# Patient Record
Sex: FEMALE | Race: WHITE | NOT HISPANIC OR LATINO | Employment: FULL TIME | URBAN - METROPOLITAN AREA
[De-identification: names, ages, dates, MRNs, and addresses within clinical notes are randomized per-mention and may not be internally consistent; named-entity substitution may affect disease eponyms.]

---

## 2017-01-05 ENCOUNTER — CLINICAL SUPPORT (OUTPATIENT)
Dept: REHABILITATION | Facility: HOSPITAL | Age: 52
End: 2017-01-05
Attending: FAMILY MEDICINE
Payer: COMMERCIAL

## 2017-01-05 DIAGNOSIS — M62.89 PELVIC FLOOR DYSFUNCTION: Primary | ICD-10-CM

## 2017-01-05 PROCEDURE — 97110 THERAPEUTIC EXERCISES: CPT | Mod: PN

## 2017-01-05 PROCEDURE — 97161 PT EVAL LOW COMPLEX 20 MIN: CPT | Mod: PN

## 2017-01-05 NOTE — PROGRESS NOTES
Patient: Tara Arambula [facility] #:  [id number]    Date of treatment: 01/05/2017   Time in: 2:26  Time out: 3:05  # Visits: 1/1  Auth: 1  Referral expiration: 12/31/17  POC expiration: 3/30/17    Outpatient Physical Therapy   Initial Evaluation    Tara Arambula is a 51 y.o. female evaluated on 01/05/2017    Physician:  Leona Broussard NP   Diagnosis: Incontinence, urinary urgency    Treatment ordered: PT    Medical History:   Past Medical History   Diagnosis Date    Urinary tract infection     Uterine fibroid         Surgical History:   Past Surgical History   Procedure Laterality Date    Tubal ligation  December 1999     Postpartum    Rhinoplasty tip  1980    Umbilical cyst  2004    Pelvic laparoscopy       uterine septum repair    Hysterectomy          Medications:   Current Outpatient Prescriptions   Medication Sig    amitriptyline (ELAVIL) 10 MG tablet Take 1 tablet (10 mg total) by mouth nightly as needed for Insomnia.    DOC-Q-LACE 100 mg capsule TAKE 1 CAPSULE BY MOUTH TWICE DAILY    estradiol (ESTRACE) 0.01 % (0.1 mg/gram) vaginal cream Place 1 g vaginally twice a week.    fish oil-omega-3 fatty acids 300-1,000 mg capsule Take 2 g by mouth once daily.    hydrOXYzine HCl (ATARAX) 25 MG tablet Take 1 tablet (25 mg total) by mouth once daily.    methen-m.blue-s.phos-phsal-hyo (URIBEL) 118-10-40.8-36 mg Cap Take 1 capsule by mouth 4 (four) times daily as needed.    methen-m.blue-s.phos-phsal-hyo (URIBEL) 118-10-40.8-36 mg Cap Take 1 capsule by mouth every 6 (six) hours as needed.    mirabegron (MYRBETRIQ) 50 mg Tb24 Take 1 tablet (50 mg total) by mouth once daily.    multivitamin (THERAGRAN) per tablet Take 2 tablets by mouth once daily.    polyethylene glycol (COLYTE) 240-22.72-6.72 -5.84 gram SolR      No current facility-administered medications for this visit.        Allergies: Review of patient's allergies indicates: none reported  No Known Allergies   Precautions: universal    OB/GYN History: childbirth vaginal delivery, episiotomy, prolapse, vaginal dryness, painful vaginal penetration and pelvic pain; 3 pregnancies, highest birth weight 8 lbs 4 ounces  Bladder/Bowel History: denies    Recent Surgeries per pt's chart:  1) otal laparoscopic hysterectomy  2) Bilateral laparoscopic salpingectomy  3) Bilateral laparoscopic uterosacral suspension  4) Cystourethroscopy  5) Posterior repair with perineorrhaphy  6) Intraoperative radiology films for incorrect count: positive for needle in pelvis, removed laparoscopically, repeat film negative and count correct       Barriers to Learning: none  Educational/Spiritual/Cultural needs: none  Environmental Barriers: none noted  Abuse/Neglect: no signs  Nutritional Status: well developed well nourished  Fall Risk: none    Subjective     What is your primary concern? And when did this first begin?  Pt reports she has been diagnosed with IC and also has some vaginal pain. She had a hysterectomy 2 years ago in which they removed her uterus but not her ovaries. She also had urinary issues prior to that and had her bladder tacked up as well as surgery for her bowels. She states that her IC and vaginal pain has gotten worse since her surgeries. Tara reports that she has totally changed her diet. She has not had any flare ups the past month but did just start to have a flare up yesterday. She states that she thinks working out and intercourse are the main things that trigger her flare ups. She went to an exercise class on Monday and was ok afterwards but wondering if this flare up is associated to that. She reports that she sits down a lot at work and has to switch chairs around a lot (just recently got a new chair), also issues with gardening (being hunched over maybe compressing that area). Pt reports abdominal cramping and that her pain feels like it comes from the urethra. Her abdominal cramping does not always coincide with urgency.    Pt states  that everything kind of started to really flare up about 4 months after her surgery which is also when she started going back to the gym so it has been about the past two years.    Pain: Patient reports 9/10 at worst with 0 being the lowest and 10 being the highest (terrible stinging feeling lower in the vagina). Having a feeling of urgency, not bad pain    Pain with vaginal/pelvic exam? Yes, typically pain after intercourse as opposed to during; she recently reports that she had throbbing pain in the middle of the night in her vagina, usually it is a feeling of pressure in the vagina with urgency; not so much pain with a speculum exam  Sexually active? Yes, decreased frequency    Previous treatment included: no PT    Frequency of Urination: Daytime: Every 3 hours or so if not flared up, more often if flared, maybe every hour (and worse with sit>stand)        Nighttime: once, could be every two hours if flaring    Urine Stream: strong, not as strong if flaring, tries to relax if flared up at the end and a bit more will trickle out    Bladder Leakage: No    Do you have difficulty initiating your urine stream? No  Do you feel like you are emptying completely? Yes, not as much if flared    Frequency of Bowel Movements: 1-2 times a day    Do you have difficulty initiating a bowel movement? Yes  Never had an issue with constipation before her surgery, now pt states she doesn't really feel urgency to use the bathroom for a BM so has trained herself to go anyway  Colon Leakage: No  Stool consistency? Soft and formed since using citrocelle (before was pellets)    Types/amount of Fluid Intake: 40-60 oz water, herbal tea on occasion  Diet: IC diet, no saucy things or spicy food, no tomatoes, not a lot of salad; high fiber cereal in the morning, chicken and broccoli for lunch, eats well  Current Exercise: really enjoys exercise, has recently returned to the gym, is concerned going to the gym is associated with flare  ups    Occupation: Pt works as an  and job related duties include lots of sitting.   Living situation? Lives with  and has 3 kids, two in collge, one in high school     Patient's Goals: To get better, to figure out what is causing all of this    Objective     Deferred d/t time constraint, will perform assessment at pt's next visit.    TREATMENT    Education: PT spent 15 minutes on pt education including: Instructed on general anatomy/physiology of urinary/bowel system and PF function in depth using pelvic model; discussed plan of care with patient; instructed in purpose of physical therapy and the  benefits/risks of treatment; instructed in risks of refusing treatment. Patient agreed to treatment plan. Tara Arambula demonstrated and verbalized understanding of all instruction.    Assessment     Mrs. Arambula is a 51 y.o. female referred to outpatient physical therapy with a medical diagnosis of urinary urgency, constipation, and PF muscle spasm.  Pt reports that her IC and vaginal pain have worsened since having surgery in December 2014 (hysterectomy and posterior repair with perineorrhaphy) but that she has been doing well for the past month. Unable to perform examination today d/t time constraints and need to spend today's time on history/interview and pt education regarding the role of PF PT. Will perform physical exam at pt's next visit and adjust goals/POC accordingly. Pt will benefit from physcial therapy services in order to maximize pain free functional independence. The following goals were discussed with the patient and patient is in agreement with them as to be addressed in the treatment plan.     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 Pt will verbalize improved awareness of PFM activity as palpated by PT in order to  improve activity involvement with HEP.  2. Pt will tolerate HEP to improve impairments and independence with ADL's.    Long Term Goals: 3 months (3/30/17)  1. Pt will be able to tolerate sitting for 30-60 minutes without exacerbation of symptoms in order to improve functional independence and work tolerance.  2. Pt will be able to perform exercise routine developed with PT in order to be able to exercise without exacerbation of symptoms to enhance QOL/for stress relief.  3. Pt will be independent with HEP and self management.        Plan     Pt will be treated by physical therapy 1-2 times a week for 3 months for Pt Education, HEP, therapeutic exercises, neuromuscular re-education, therapeutic activity, gait training, manual therapy, and modalities (including SEMG) PRN to achieve established goals.

## 2017-01-09 NOTE — PLAN OF CARE
Patient: Tara Arambula [facility] [address] #:  [id number]    Date of treatment: 01/05/2017   Time in: 2:26  Time out: 3:05  # Visits: 1/1  Auth: 1  Referral expiration: 12/31/17  POC expiration: 3/30/17    Outpatient Physical Therapy   Initial Evaluation    Tara Arambula is a 51 y.o. female evaluated on 01/05/2017    Physician:  Leona Broussard NP   Diagnosis: Incontinence, urinary urgency    Treatment ordered: PT    Medical History:   Past Medical History   Diagnosis Date    Urinary tract infection     Uterine fibroid         Surgical History:   Past Surgical History   Procedure Laterality Date    Tubal ligation  December 1999     Postpartum    Rhinoplasty tip  1980    Umbilical cyst  2004    Pelvic laparoscopy       uterine septum repair    Hysterectomy          Medications:   Current Outpatient Prescriptions   Medication Sig    amitriptyline (ELAVIL) 10 MG tablet Take 1 tablet (10 mg total) by mouth nightly as needed for Insomnia.    DOC-Q-LACE 100 mg capsule TAKE 1 CAPSULE BY MOUTH TWICE DAILY    estradiol (ESTRACE) 0.01 % (0.1 mg/gram) vaginal cream Place 1 g vaginally twice a week.    fish oil-omega-3 fatty acids 300-1,000 mg capsule Take 2 g by mouth once daily.    hydrOXYzine HCl (ATARAX) 25 MG tablet Take 1 tablet (25 mg total) by mouth once daily.    methen-m.blue-s.phos-phsal-hyo (URIBEL) 118-10-40.8-36 mg Cap Take 1 capsule by mouth 4 (four) times daily as needed.    methen-m.blue-s.phos-phsal-hyo (URIBEL) 118-10-40.8-36 mg Cap Take 1 capsule by mouth every 6 (six) hours as needed.    mirabegron (MYRBETRIQ) 50 mg Tb24 Take 1 tablet (50 mg total) by mouth once daily.    multivitamin (THERAGRAN) per tablet Take 2 tablets by mouth once daily.    polyethylene glycol (COLYTE) 240-22.72-6.72 -5.84 gram SolR      No current facility-administered medications for this visit.        Allergies: Review of patient's allergies indicates: none reported  No Known Allergies   Precautions: universal    OB/GYN History: childbirth vaginal delivery, episiotomy, prolapse, vaginal dryness, painful vaginal penetration and pelvic pain; 3 pregnancies, highest birth weight 8 lbs 4 ounces  Bladder/Bowel History: denies    Recent Surgeries per pt's chart:  1) otal laparoscopic hysterectomy  2) Bilateral laparoscopic salpingectomy  3) Bilateral laparoscopic uterosacral suspension  4) Cystourethroscopy  5) Posterior repair with perineorrhaphy  6) Intraoperative radiology films for incorrect count: positive for needle in pelvis, removed laparoscopically, repeat film negative and count correct       Barriers to Learning: none  Educational/Spiritual/Cultural needs: none  Environmental Barriers: none noted  Abuse/Neglect: no signs  Nutritional Status: well developed well nourished  Fall Risk: none    Subjective     What is your primary concern? And when did this first begin?  Pt reports she has been diagnosed with IC and also has some vaginal pain. She had a hysterectomy 2 years ago in which they removed her uterus but not her ovaries. She also had urinary issues prior to that and had her bladder tacked up as well as surgery for her bowels. She states that her IC and vaginal pain has gotten worse since her surgeries. Tara reports that she has totally changed her diet. She has not had any flare ups the past month but did just start to have a flare up yesterday. She states that she thinks working out and intercourse are the main things that trigger her flare ups. She went to an exercise class on Monday and was ok afterwards but wondering if this flare up is associated to that. She reports that she sits down a lot at work and has to switch chairs around a lot (just recently got a new chair), also issues with gardening (being hunched over maybe compressing that area). Pt reports abdominal cramping and that her pain feels like it comes from the urethra. Her abdominal cramping does not always coincide with urgency.    Pt states  that everything kind of started to really flare up about 4 months after her surgery which is also when she started going back to the gym so it has been about the past two years.    Pain: Patient reports 9/10 at worst with 0 being the lowest and 10 being the highest (terrible stinging feeling lower in the vagina). Having a feeling of urgency, not bad pain    Pain with vaginal/pelvic exam? Yes, typically pain after intercourse as opposed to during; she recently reports that she had throbbing pain in the middle of the night in her vagina, usually it is a feeling of pressure in the vagina with urgency; not so much pain with a speculum exam  Sexually active? Yes, decreased frequency    Previous treatment included: no PT    Frequency of Urination: Daytime: Every 3 hours or so if not flared up, more often if flared, maybe every hour (and worse with sit>stand)        Nighttime: once, could be every two hours if flaring    Urine Stream: strong, not as strong if flaring, tries to relax if flared up at the end and a bit more will trickle out    Bladder Leakage: No    Do you have difficulty initiating your urine stream? No  Do you feel like you are emptying completely? Yes, not as much if flared    Frequency of Bowel Movements: 1-2 times a day    Do you have difficulty initiating a bowel movement? Yes  Never had an issue with constipation before her surgery, now pt states she doesn't really feel urgency to use the bathroom for a BM so has trained herself to go anyway  Colon Leakage: No  Stool consistency? Soft and formed since using citrocelle (before was pellets)    Types/amount of Fluid Intake: 40-60 oz water, herbal tea on occasion  Diet: IC diet, no saucy things or spicy food, no tomatoes, not a lot of salad; high fiber cereal in the morning, chicken and broccoli for lunch, eats well  Current Exercise: really enjoys exercise, has recently returned to the gym, is concerned going to the gym is associated with flare  ups    Occupation: Pt works as an  and job related duties include lots of sitting.   Living situation? Lives with  and has 3 kids, two in collge, one in high school     Patient's Goals: To get better, to figure out what is causing all of this    Objective     Deferred d/t time constraint, will perform assessment at pt's next visit.    TREATMENT    Education: PT spent 15 minutes on pt education including: Instructed on general anatomy/physiology of urinary/bowel system and PF function in depth using pelvic model; discussed plan of care with patient; instructed in purpose of physical therapy and the  benefits/risks of treatment; instructed in risks of refusing treatment. Patient agreed to treatment plan. Tara Arambula demonstrated and verbalized understanding of all instruction.    Assessment     Mrs. Arambula is a 51 y.o. female referred to outpatient physical therapy with a medical diagnosis of urinary urgency, constipation, and PF muscle spasm.  Pt reports that her IC and vaginal pain have worsened since having surgery in December 2014 (hysterectomy and posterior repair with perineorrhaphy) but that she has been doing well for the past month. Unable to perform examination today d/t time constraints and need to spend today's time on history/interview and pt education regarding the role of PF PT. Will perform physical exam at pt's next visit and adjust goals/POC accordingly. Pt will benefit from physcial therapy services in order to maximize pain free functional independence. The following goals were discussed with the patient and patient is in agreement with them as to be addressed in the treatment plan.     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 Pt will verbalize improved awareness of PFM activity as palpated by PT in order to  improve activity involvement with HEP.  2. Pt will tolerate HEP to improve impairments and independence with ADL's.    Long Term Goals: 3 months (3/30/17)  1. Pt will be able to tolerate sitting for 30-60 minutes without exacerbation of symptoms in order to improve functional independence and work tolerance.  2. Pt will be able to perform exercise routine developed with PT in order to be able to exercise without exacerbation of symptoms to enhance QOL/for stress relief.  3. Pt will be independent with HEP and self management.        Plan     Pt will be treated by physical therapy 1-2 times a week for 3 months for Pt Education, HEP, therapeutic exercises, neuromuscular re-education, therapeutic activity, gait training, manual therapy, and modalities (including SEMG) PRN to achieve established goals.

## 2017-01-17 ENCOUNTER — CLINICAL SUPPORT (OUTPATIENT)
Dept: REHABILITATION | Facility: HOSPITAL | Age: 52
End: 2017-01-17
Attending: FAMILY MEDICINE
Payer: COMMERCIAL

## 2017-01-17 DIAGNOSIS — M62.89 PELVIC FLOOR DYSFUNCTION: Primary | ICD-10-CM

## 2017-01-17 PROCEDURE — 97112 NEUROMUSCULAR REEDUCATION: CPT | Mod: PN

## 2017-01-17 NOTE — PROGRESS NOTES
Patient: Tara Arambula [facility] #: [id number]   Diagnosis: No diagnosis found.   Date of start of care: 1/5/17  Date of treatment: 01/17/2017   Time in: 8:05  Time out: 9:02  Total treatment time: 57 minutes  # Visits: 2/20  Auth expiration: 12/31/17  POC expiration: 3/30/17  Outpatient Physical Therapy   Daily Note    Subjective     Pt states she has had a bit of flaring since her last visit, not cramping, just urgency.    Pain: Current: 0/10    Objective     ORTHO SCREEN  Posture: decreased lumbar lordosis     Pelvic Alignment: no deviations noted in supine    ABDOMINALS  Scarring: 3 incision sites from laproscopic hysterectomy all with good healing and good mobility, 1 vertical incision from navel down ~ 2-3 inches, good mobility, minimally adhesed   Diastasis: none   Abdominal strength: Rectus: good     Transverse: strong contraction, good isolation from RA    Tenderness noted with palpation to bilateral iliacus, L > R       VAGINAL PELVIC FLOOR EXAM  EXTERNAL ASSESSMENT  Skin Condition: WNL  Scarring: episiotomy scar noted  Sensation: WNL  Pain: none  Introitus: gaping   Voluntary Contraction: visible lift  Voluntary Relaxation: minimal drop  Involuntary Contraction: bulge  Bearing down: anterior vaginal wall weakness noted; unable to bear down, initial contraction noted followed by use of abdominals and straining      INTERNAL ASSESSMENT  Pain: tender areas noted as follows: bilateral obturators  Sensation: able to localize pressure appropriately, pressure from side to side and ability to feel the difference between lift and drop    Vaginal Vault: roomy  Muscle Bulk: atrophy  Muscle Power: 2-3/5 L side, 2 at best on the R  Muscle Endurance: 2-3 sec; breath holding and tenses abdominals  # Reps To Fatigue: NT d/t weakness    Fast Contractions: pt performed 5 quick flicks - she demonstrated incomplete drop     Quality of Contraction: decreased hold; pt demonstrates good sphincter control and strength but  decreased PFM strength/control  Specificity: contracts abdominals   Coordination: tends to hold breath during PFM contraction  Bearing down: pt demonstrates PFM excursion but strains and uses abdominal     SEMG EVALUATION: Deferred due to time constraints      TREATMENT    Pt was instructed in neuromuscular reeducation for 45 minutes including:  - Kegals with manual feedback x 10  - 5 quick flicks; incomplete drop  - Diaphragmatic breathing with tactile feedback and verbal instruction throughout    Education: Discussed functional status, progress and POC with patient. Pt was instructed in HEP including diaphragmatic breathing and was provided with a handout. Tara demonstrated and verbalized understanding of all instruction given.    Assessment     Pt tolerated session well without visible adverse effects. Performed internal exam today with findings noted above. Tara presents with poor coordination and decreased strength/atrophy, endurance, and control of her PFM. She will benefit from skilled PT intervention to address these deficits in order to maximize pain free functional independence. Updated goals as noted below based on exam findings. Will instruct patient in PFM exercises/therapeutic exercise at next visit and provide manual therapy as appropriate.    Pt is making good progress towards established goals. No spiritual, cultural, or educational needs identified.    Updated GOALS  Short Term Goals: 1 month (2/2/17)  1. Pt will verbalize improved awareness of PFM activity as palpated by PT in order to improve activity involvement with HEP.  2. Pt will tolerate HEP to improve impairments and independence with ADL's.  3. Pt will demonstrate ability to perform kegal on demand with decreased use of overflow mm and without breath holding. Goal added 1/17/17     Long Term Goals: 3 months (3/30/17)  1. Pt will be able to tolerate sitting for 30-60 minutes without exacerbation of symptoms in order to improve  functional independence and work tolerance.  2. Pt will be able to perform exercise routine developed with PT in order to be able to exercise without exacerbation of symptoms to enhance QOL/for stress relief.  3. Pt will be independent with HEP and self management.  4. Pt will increase PFM strength to 3/5 with ability to hold for 5-10 seconds without exacerbation of symptoms in order to improve functional strength and tolerance to exercise. Goal added 1/17/17    FOTO next visit    Plan     Continue with established POC, working toward PT goals.

## 2017-01-17 NOTE — PATIENT INSTRUCTIONS
Home Exercise Program: 01/17/2017      DIAPHRAGMATIC BREATHING     The diaphragm is a dome shaped muscle that forms the floor of the rib cage. It is the most efficient muscle for breathing and relaxation, although most people are not used to using the diaphragm. Diaphragmatic or belly breathing is an important technique to learn because it helps settle down or relax the autonomic nervous system. The correct use of diaphragmatic breathing can help to quiet brain activity resulting in the relaxation of all the muscles and organs of the body. This is accomplished by slow rhythmic breathing concentrated in the diaphragm muscle rather than the chest.    How to do proper relaxation breathing:     Start by lying on your back or reclining in a chair in a relaxed position. Place one hand on your chest and the other on your abdomen.   Relax your jaw by placing your tongue on the roof of your mouth and keeping your teeth slightly apart.    Take a deep breath in through your nose, letting the abdomen expand and rise while you keep your upper chest, neck and shoulders relaxed.    As you breathe out through your mouth, allow your abdomen and chest to fall. Exhale completely.   Remember to breathe slowly.  Do not force your breathing. Do not hold your breath.   Repeat for _______ minutes.

## 2017-01-24 ENCOUNTER — CLINICAL SUPPORT (OUTPATIENT)
Dept: REHABILITATION | Facility: HOSPITAL | Age: 52
End: 2017-01-24
Attending: FAMILY MEDICINE
Payer: COMMERCIAL

## 2017-01-24 DIAGNOSIS — M62.89 PELVIC FLOOR DYSFUNCTION: Primary | ICD-10-CM

## 2017-01-24 PROCEDURE — 97140 MANUAL THERAPY 1/> REGIONS: CPT | Mod: PN

## 2017-01-24 PROCEDURE — 97112 NEUROMUSCULAR REEDUCATION: CPT | Mod: PN

## 2017-01-24 NOTE — PROGRESS NOTES
Patient: Tara [facility] #: [id number]   Diagnosis:   Encounter Diagnosis   Name Primary?    Pelvic floor dysfunction Yes      Date of start of care: 1/5/17  Date of treatment: 01/24/2017   Time in: 8:05  Time out: 9:12  Total treatment time: 67 minutes  # Visits: 3/20  Auth expiration: 12/31/17  POC expiration: 3/30/17  Outpatient Physical Therapy   Daily Note    Subjective     Pt states she flared up a few days last week and then it just went away on Friday. She does state that she went to the gym on Thursday so maybe that provided some stress relief that helped alleviate the flare up ( she has had a lot going on and reports being a bit anxious about it all). Pt also states that her mother has dementia and she is the only one in town to manage.    Pain: Current: 0/10    Objective     TREATMENT    Pt was instructed in neuromuscular reeducation for 35 minutes including:  - 8 x 5'' kegals with 10'' rest (pt was instructed to take 3 diaphragmatic breaths between contractions) manual feedback; Pt demonstrates normal resting baseline but incomplete drop during kegals today requiring verbal cueing after each contraction; Tara was able to derecruit slowly but fully with verbal cueing and instruction to take deep breaths between contractions    Pt received the following manual therapy techniques for 25 min: strain counterstrain applied to R iliacus; soft tissue mobilization applied to R levators; soft tissue and myofascial mobilization applied to R obturator internus with sustained stretch; Renetta was noted to have increased tightness on the right today, including R obturator and right abdominal region; R levators noted to have atrophy compared to L    Education: Discussed functional status, progress and POC with patient. Pt was instructed to continue with current HEP consisting of diaphragmatic breathing and was instructed in additional HEP of kegals (see pt instructions) and piriformis stretch; she  verbalized and demonstrated understanding and was provided with a handout. Tara demonstrated and verbalized understanding of all instruction given.    Assessment     Pt tolerated session well without visible adverse effects. She presented today with increased tension throughout the right side of her pelvic including her lower abdomen, iliacus, and obturator internus. Applied iliacus release today, will perform soft tissue and myofascial mobilization of her abdominal region as appropriate moving forward. Tara demonstrated improved ability to perform a PFM contraction today with minimal if any use of overflow mm and ability to maintain breathing pattern; she required verbal cueing to drop fully and demonstrated slow derecruitment. Will continue to progress. Tara will continue to benefit from skilled PT intervention to maximize pain free functional independence.  Will provide manual therapy to abdomen as needed at next session and evaluate PFM via SEMG.  Pt is making good progress towards established goals. No spiritual, cultural, or educational needs identified.    Updated GOALS  Short Term Goals: 1 month (2/2/17)  1. Pt will verbalize improved awareness of PFM activity as palpated by PT in order to improve activity involvement with HEP.  2. Pt will tolerate HEP to improve impairments and independence with ADL's.  3. Pt will demonstrate ability to perform kegal on demand with decreased use of overflow mm and without breath holding. Goal added 1/17/17     Long Term Goals: 3 months (3/30/17)  1. Pt will be able to tolerate sitting for 30-60 minutes without exacerbation of symptoms in order to improve functional independence and work tolerance.  2. Pt will be able to perform exercise routine developed with PT in order to be able to exercise without exacerbation of symptoms to enhance QOL/for stress relief.  3. Pt will be independent with HEP and self management.  4. Pt will increase PFM strength to 3/5 with  ability to hold for 5-10 seconds without exacerbation of symptoms in order to improve functional strength and tolerance to exercise. Goal added 1/17/17    Plan     Continue with established POC, working toward PT goals.

## 2017-01-24 NOTE — PATIENT INSTRUCTIONS
Home Exercises: 1/24/17    Kegals in supine    1. Lie comfortably with some support under your knees. Take a few deep breaths to relax and to feel your belly soften.    2. Squeeze and lift your pelvic floor muscles as if trying not to pee (i.e. Kegal) and hold for 5 seconds without holding your breath. Remember to keep your buttocks, inner thighs, and belly relaxed.    3. Relax and drop your pelvic floor muscles. Once you feel you have released the contraction, take 3 diaphragmatic breaths to feel your pelvic floor relaxing even more.    4. Repeat 10 times, twice per day.    5. Follow up with a few minutes of deep breathing and stretching.

## 2017-01-31 ENCOUNTER — CLINICAL SUPPORT (OUTPATIENT)
Dept: REHABILITATION | Facility: HOSPITAL | Age: 52
End: 2017-01-31
Attending: FAMILY MEDICINE
Payer: COMMERCIAL

## 2017-01-31 DIAGNOSIS — M62.89 PELVIC FLOOR DYSFUNCTION: Primary | ICD-10-CM

## 2017-01-31 PROCEDURE — 97112 NEUROMUSCULAR REEDUCATION: CPT | Mod: PN

## 2017-01-31 NOTE — PROGRESS NOTES
Patient: Tara [facility] #: [id number]   Diagnosis:   Encounter Diagnosis   Name Primary?    Pelvic floor dysfunction Yes      Date of start of care: 1/5/17  Date of treatment: 01/31/2017   Time in: 8:05  Time out: 9:05  Total treatment time: 60 minutes  # Visits: 4/20  Auth expiration: 12/31/17  POC expiration: 3/30/17  Outpatient Physical Therapy   Daily Note    Subjective     Pt states she had a pretty significant flare up since our last visit that has since resolved. She was a bit sore after our last session. She reports that she did do the kegals a few times but did not perform them when she was really flared.     Pain: Current: pt reports discomfort and mild pain in her lateral lower abdomen bilaterally    Objective     TREATMENT    Pt was instructed in neuromuscular reeducation for 55 minutes including:    - PT guided relaxation training with SEMG assist; pt demonstrated initial elevated resting baseline but able to achieve normal resting baseline post relaxation training  -  10 x 5''kegals with 10'' rest in supine with SEMG assist; pt demonstrates normal resting baseline, good WR rise, fair holding ability, slow derecruitment  - 5 x 5'' TA activation with SEMG; pt demonstrates moderate carry over to her PFM with incomplete derecruitment of PFM between contractions  - Progressive muscle relaxation (body scan)    Pt received the following manual therapy techniques for 0 min: strain counterstrain applied to R iliacus; soft tissue mobilization applied to R levators; soft tissue and myofascial mobilization applied to R obturator internus with sustained stretch; Renetta was noted to have increased tightness on the right today, including R obturator and right abdominal region; R levators noted to have atrophy compared to L    Education: Discussed functional status, progress and POC with patient. Pt was instructed to continue with current HEP consisting of diaphragmatic breathing and kegals (see pt  instructions) and piriformis stretch; she was instructed to add in calm seema body scan to her home routine. she verbalized and demonstrated understanding. Traa demonstrated and verbalized understanding of all instruction given.    Assessment     Pt tolerated session well without visible adverse effects. She demonstrates good awareness of her PFM; she continues to present with slow and sometimes incomplete derecruitment but is able to drop fully with cueing and when instructed to take time in between contractions to focus on her breath. Will reassess abdominal region at Tara's next visit and treat accordingly. Will begin therapeutic exercises/stretching program at next visit. Tara will continue to benefit from skilled PT intervention to maximize pain free functional independence.  Pt is making good progress towards established goals. No spiritual, cultural, or educational needs identified.    Updated GOALS  Short Term Goals: 1 month (2/2/17)  1. Pt will verbalize improved awareness of PFM activity as palpated by PT in order to improve activity involvement with HEP.  2. Pt will tolerate HEP to improve impairments and independence with ADL's.  3. Pt will demonstrate ability to perform kegal on demand with decreased use of overflow mm and without breath holding. Goal added 1/17/17     Long Term Goals: 3 months (3/30/17)  1. Pt will be able to tolerate sitting for 30-60 minutes without exacerbation of symptoms in order to improve functional independence and work tolerance.  2. Pt will be able to perform exercise routine developed with PT in order to be able to exercise without exacerbation of symptoms to enhance QOL/for stress relief.  3. Pt will be independent with HEP and self management.  4. Pt will increase PFM strength to 3/5 with ability to hold for 5-10 seconds without exacerbation of symptoms in order to improve functional strength and tolerance to exercise. Goal added 1/17/17    Plan     Continue with  established POC, working toward PT goals.

## 2017-02-02 ENCOUNTER — OFFICE VISIT (OUTPATIENT)
Dept: UROGYNECOLOGY | Facility: CLINIC | Age: 52
End: 2017-02-02
Payer: COMMERCIAL

## 2017-02-02 VITALS
WEIGHT: 109.38 LBS | HEIGHT: 65 IN | DIASTOLIC BLOOD PRESSURE: 60 MMHG | BODY MASS INDEX: 18.22 KG/M2 | SYSTOLIC BLOOD PRESSURE: 90 MMHG

## 2017-02-02 DIAGNOSIS — Z87.19 HX OF CONSTIPATION: ICD-10-CM

## 2017-02-02 DIAGNOSIS — R39.15 URINARY URGENCY: ICD-10-CM

## 2017-02-02 DIAGNOSIS — N30.10 INTERSTITIAL CYSTITIS: Primary | ICD-10-CM

## 2017-02-02 DIAGNOSIS — N95.2 VAGINAL ATROPHY: ICD-10-CM

## 2017-02-02 LAB
BILIRUB SERPL-MCNC: NORMAL MG/DL
BLOOD URINE, POC: NORMAL
COLOR, POC UA: NORMAL
GLUCOSE UR QL STRIP: NORMAL
KETONES UR QL STRIP: NORMAL
LEUKOCYTE ESTERASE URINE, POC: NORMAL
NITRITE, POC UA: NORMAL
PH, POC UA: 1
PROTEIN, POC: NORMAL
SPECIFIC GRAVITY, POC UA: 6
UROBILINOGEN, POC UA: NORMAL

## 2017-02-02 PROCEDURE — 99999 PR PBB SHADOW E&M-EST. PATIENT-LVL III: CPT | Mod: PBBFAC,,, | Performed by: NURSE PRACTITIONER

## 2017-02-02 PROCEDURE — 99213 OFFICE O/P EST LOW 20 MIN: CPT | Mod: 25,S$GLB,, | Performed by: NURSE PRACTITIONER

## 2017-02-02 PROCEDURE — 81002 URINALYSIS NONAUTO W/O SCOPE: CPT | Mod: S$GLB,,, | Performed by: NURSE PRACTITIONER

## 2017-02-02 NOTE — PROGRESS NOTES
Urogyn follow up  2017    [facility]  [address] [address]    Tara Unger  [id number]  1965      Tara Unger is a 51 y.o.  here for a urogyn follow up. Long discussion of her symptoms     14  Title of Operation:  1) Total laparoscopic hysterectomy  2) Bilateral laparoscopic salpingectomy  3) Bilateral laparoscopic uterosacral suspension  4) Cystourethroscopy  5) Posterior repair with perineorrhaphy  6) Intraoperative radiology films for incorrect count: positive for needle in pelvis, removed laparoscopically, repeat film negative and count correct.     Indications for Surgery:  1) UI: (+) UVALDO only if bladder really full and sneezes, not with exercise. UVALDO not daily. (--) pads, no underwear changes. Daytime frequency: Q 2 hours. Nocturia: Yes: 1/night, around 4 AM. (--) dysuria, (--) hematuria, (--) frequent UTIs. Has had 1-2 mild UTIs. (+) complete bladder emptying.  --UF prolonged but normal otherwise. PF incomplete void until catheters removed. Compliance normal. Max capacity >400 mL. DO (--). UVALDO (--).   2) POP: Absent. (--) vaginal bleeding. +nl menses on OCP. (--) vaginal discharge. (+) sexually active. (--) dyspareunia. (+) Vaginal dryness. (--) vaginal estrogen use.   --POP-Q: Aa -1; Ba -1; C -6; Ap 0; Bp 0 (very distal rectocele); D -9. Genital hiatus 3, perineal body 3, total vaginal length 10.  3) BM: (+) constipation/straining. (--) chronic diarrhea. (--) hematochezia. (--) fecal incontinence. (--) fecal smearing/urgency. (+) incomplete evacuation. No splinting.   4) ? IC: Dx last year per Dr. Labadie at [facility]. No cysto. Was counseled to eliminate exacerbating foods--did seem to help. Seems worse after on feet all day or with stress. Sx: frequency, urgency, feels some burning at the end of void, difficult evacuation. +relief with emptying. Still + menses--on OCP due to frequent menses. No correlation with menses. May have some  flare with intercourse. Pain with flare: 6/10 only with urination, usually lasts x several weeks but gradually improves. Flares occur Q 2-3 months. Does have some anxiety. No previous treatment.   5) Patient desires ovarian preservation.     Preoperative Diagnosis:  1) Cystocele  2) Rectocele  3) Dysfunctional uterine bleeding  4) Urinary urgency/frequency  5) Concern for interstitial cystitis  6) Chronic constipation  7) Defecatory dysfunction  8) Mixed urinary incontinence    Postoperative Diagnosis:  1) Cystocele  2) Rectocele  3) Dysfunctional uterine bleeding  4) Urinary urgency/frequency  5) Concern for interstitial cystitis  6) Chronic constipation  7) Defecatory dysfunction  8) Mixed urinary incontinence    Past Medical History   Diagnosis Date    Urinary tract infection     Uterine fibroid        Past Surgical History   Procedure Laterality Date    Tubal ligation  December 1999     Postpartum    Rhinoplasty tip  1980    Umbilical cyst  2004    Pelvic laparoscopy       uterine septum repair    Hysterectomy         Current Outpatient Prescriptions   Medication Sig    DOC-Q-LACE 100 mg capsule TAKE 1 CAPSULE BY MOUTH TWICE DAILY    methen-m.blue-s.phos-phsal-hyo (URIBEL) 118-10-40.8-36 mg Cap Take 1 capsule by mouth 4 (four) times daily as needed.    fish oil-omega-3 fatty acids 300-1,000 mg capsule Take 2 g by mouth once daily.    hydrOXYzine HCl (ATARAX) 25 MG tablet Take 1 tablet (25 mg total) by mouth once daily.    multivitamin (THERAGRAN) per tablet Take 2 tablets by mouth once daily.    ospemifene 60 mg Tab Take 60 mg by mouth once daily.     No current facility-administered medications for this visit.        Last HPI dated 11/03/2016  1)  UI:  (--) UVALDO   (--) UUI   (--) pads:  Daytime frequency: Q 3  hours.  Nocturia: NO:   (--) dysuria,  (--) hematuria,  (--) frequent UTIs.  (--) complete bladder emptying.   Failed myrbetriq, trospium, and oxybutynin.  Using uribel intermittently.   She  had stopped myrbetriq 2 weeks ago x 4-5 days.  Started flaring.  She restarted and felt better after 1 week.  Has had increased urgency after intercourse 4 days ago.     2)  POP:  Absent.  introitus.  Symptoms:(--)    (--) vaginal bleeding. (--) vaginal discharge. (+) sexually active.  (--) dyspareunia (+)  Vaginal dryness.  (+) vaginal estrogen use- using estrace.    3)  BM:  (--) constipation/straining.  (--) chronic diarrhea. (--) hematochezia.  (--) fecal incontinence.  (--) fecal smearing/urgency.  (--) incomplete evacuation.  Eats high fiber diet.  Takes stool softener and fiber gummy.     4)?IC-  Had relief with last instillation this week.  Only had urgency after procedure and on Sunday.  Started using prelief-- not using with each meal.  No real flare this week. Hydroxyzine--1/2 tablet working wel    Changes since last visit  1)  UI:  (--) UVALDO   (--) UUI   (--) pads:  Daytime frequency: Q 3  hours.  Nocturia: NO:   (--) dysuria,  (--) hematuria,  (--) frequent UTIs.  (--) complete bladder emptying.   Failed myrbetriq, trospium, and oxybutynin.  Using uribel intermittently.    Has occasional urgency/burning.  Most of the irritation is around the urethra.    2)  POP:  Absent.  introitus.  Symptoms:(--)    (--) vaginal bleeding. (--) vaginal discharge. (+) sexually active.  (--) dyspareunia (+)  Vaginal dryness.  (+) vaginal estrogen use- using estrace.    3)  BM:  (--) constipation/straining.  (--) chronic diarrhea. (--) hematochezia.  (--) fecal incontinence.  (--) fecal smearing/urgency.  (--) incomplete evacuation.  Eats high fiber diet.  Was very constipated on myrbetriq and amitrypyline.      4)?IC-  Did not tolerate amitriptyline ? constipation  --burning is near urethra  --uses uribel prn  --seeing Lauren Shepley for pelvic floor PT  --stopped hydoxizine, myrbetrix, and elavil    ROS:  As per HPI.      Well Woman:  Pap:02/2013 normal- patient is post hyst- no further screening needed  Mammo:08/03/2016-  "normal  Colonoscopy:07/29/2016 normal- repeat in 10 years  Dexa:2014 normal per patient report      Exam  Visit Vitals    BP 90/60 (BP Location: Left arm, Patient Position: Sitting, BP Method: Manual)    Ht 5' 5" (1.651 m)    Wt 49.6 kg (109 lb 5.6 oz)    LMP 11/30/2014    BMI 18.2 kg/m2     General: alert and oriented, no acute distress  Respiratory: normal respiratory effort  Abd: soft, non-tender, non-distended    Pelvic--deferred      Impression  1. Urinary urgency  2. Vaginal atrophy  3. History of Constipation  4. IC    We reviewed the above issues and discussed options for short-term versus long-term management of her problems.   Plan:     1) Bladder health  --Empty bladder every 3 hours.  Empty well: wait a minute, lean forward on toilet.    --Avoid dietary irritants (see sheet).  Keep diary x 3-5 days to determine your irritants.    --URGE: oxybutynin, trospium cause constipation; myrbetriq did not help (but she thinks she was still taking biotin).  Uses uribel intermittently.  Use uribel post coital and any time you are having urgency.  --continue myrbetriq    2)  IC  --Recommend Prelief to help alkinize the urine:   --Prelief Tablets : Each tablet contains 345 mg calcium glycerophosphate (65 mg of elemental calcium). The tablets also contain 0.25% magnesium  stearate as a processing aid. Two tablets are equivalent to 690 mg calcium glycerophosphate (130mg of elemental calcium).   --Prelief Powder : Each ¼ teaspoon usage of powder is comparable to two tablets. The powder dissolves rapidly in food or non-alcoholic beverages.  Tablets are recommended for taking with alcoholic beverages   --Usage:     --Tablets: 2-3 tablets, 2 times a day.    --Powder: ¼ teaspoon of powder 2 times a day. More can be used when needed     --Hydrodistention With Cystoscopy: allows physicians to take a much closer look at the bladder wall. While the AUA no longer suggests that it be used as a diagnostic method (unless a " diagnosis is in doubt), hydrodistention may provide modest relief in IC symptoms which can last from three to six months.   --continue  pelvic floor PT      3)Vaginal atrophy (dryness):  Did not tolerate premarin  --trial of osphena  --will get a call for osphena at home in [place]    4)Constipation controlled- continue fiber    5)RTC 07/2017 for annual    30 minutes were spent in face to face time with this patient  90 % of this time was spent in counseling and/or coordination of care    GILDARDO Mccarthy-BC  [facility]  Division of Female Pelvic Medicine and Reconstructive Surgery  Department of Obstetrics & Gynecology

## 2017-02-02 NOTE — PATIENT INSTRUCTIONS
1) Bladder health  --Empty bladder every 3 hours.  Empty well: wait a minute, lean forward on toilet.    --Avoid dietary irritants (see sheet).  Keep diary x 3-5 days to determine your irritants.    --URGE: oxybutynin, trospium cause constipation; myrbetriq did not help (but she thinks she was still taking biotin).  Uses uribel intermittently.  Use uribel post coital and any time you are having urgency.  --continue myrbetriq    2)  IC  --Recommend Prelief to help alkinize the urine:   --Prelief Tablets : Each tablet contains 345 mg calcium glycerophosphate (65 mg of elemental calcium). The tablets also contain 0.25% magnesium  stearate as a processing aid. Two tablets are equivalent to 690 mg calcium glycerophosphate (130mg of elemental calcium).   --Prelief Powder : Each ¼ teaspoon usage of powder is comparable to two tablets. The powder dissolves rapidly in food or non-alcoholic beverages.  Tablets are recommended for taking with alcoholic beverages   --Usage:     --Tablets: 2-3 tablets, 2 times a day.    --Powder: ¼ teaspoon of powder 2 times a day. More can be used when needed     --Hydrodistention With Cystoscopy: allows physicians to take a much closer look at the bladder wall. While the AUA no longer suggests that it be used as a diagnostic method (unless a diagnosis is in doubt), hydrodistention may provide modest relief in IC symptoms which can last from three to six months.   --continue pelvic floor PT      3)Vaginal atrophy (dryness):  Did not tolerate premarin  --trial of osphena  --will get a call for osphena at home in [place]    4)Constipation controlled- continue fiber    5)[facility] 07/2017 for annual

## 2017-02-02 NOTE — MR AVS SNAPSHOT
[facility]  [address] [address]  Phone: [phone]                  Tara Unger   2017 9:30 AM   Office Visit    Description:  Female : 1965   Provider:  Leona Broussard NP   Department:  [facility]           Reason for Visit     interstitial cystitis           Diagnoses this Visit        Comments    Urinary frequency    -  Primary     Vaginal atrophy                To Do List           Future Appointments        Provider Department Dept Phone    2017 8:00 AM Lauren Shepley, PT [facility] [phone]    2017 8:00 AM Lauren Shepley, PT [facility] [phone]      Goals (5 Years of Data)     None       These Medications        Disp Refills Start End    ospemifene 60 mg Tab 90 tablet 4 2017 5/3/2017    Take 60 mg by mouth once daily. - Oral    Pharmacy: [address] #: [phone]         [facility]     [facility] -  Assistance  Registered nurses in the [facility] provide clinical advisement, health education, appointment booking, and other advisory services.  Call for this free service at [phone].             Medications           Message regarding Medications     Verify the changes and/or additions to your medication regime listed below are the same as discussed with your clinician today.  If any of these changes or additions are incorrect, please notify your healthcare provider.        START taking these NEW medications        Refills    ospemifene 60 mg Tab 4    Sig: Take 60 mg by mouth once daily.    Class: Normal    Route: Oral      STOP taking these medications     amitriptyline (ELAVIL) 10 MG tablet Take 1 tablet (10 mg total) by mouth nightly as needed for Insomnia.    polyethylene glycol (COLYTE) 240-22.72-6.72  "-5.84 gram SolR     mirabegron (MYRBETRIQ) 50 mg Tb24 Take 1 tablet (50 mg total) by mouth once daily.    estradiol (ESTRACE) 0.01 % (0.1 mg/gram) vaginal cream Place 1 g vaginally twice a week.           Verify that the below list of medications is an accurate representation of the medications you are currently taking.  If none reported, the list may be blank. If incorrect, please contact your healthcare provider. Carry this list with you in case of emergency.           Current Medications     DOC-Q-LACE 100 mg capsule TAKE 1 CAPSULE BY MOUTH TWICE DAILY    methen-m.blue-s.phos-phsal-hyo (URIBEL) 118-10-40.8-36 mg Cap Take 1 capsule by mouth 4 (four) times daily as needed.    fish oil-omega-3 fatty acids 300-1,000 mg capsule Take 2 g by mouth once daily.    hydrOXYzine HCl (ATARAX) 25 MG tablet Take 1 tablet (25 mg total) by mouth once daily.    multivitamin (THERAGRAN) per tablet Take 2 tablets by mouth once daily.    ospemifene 60 mg Tab Take 60 mg by mouth once daily.           Clinical Reference Information           Your Vitals Were     BP Height Weight Last Period BMI    90/60 (BP Location: Left arm, Patient Position: Sitting, BP Method: Manual) 5' 5" (1.651 m) 49.6 kg (109 lb 5.6 oz) 11/30/2014 18.2 kg/m2      Blood Pressure          Most Recent Value    BP  90/60      Allergies as of 2/2/2017     No Known Allergies      Immunizations Administered on Date of Encounter - 2/2/2017     None      Orders Placed During Today's Visit      Normal Orders This Visit    POCT URINE DIPSTICK WITHOUT MICROSCOPE          2/2/2017  9:52 AM - Alyson Wolf MA      Component Results     Component    Color, UA    green    Spec Grav, UA    6    pH, UA    1.000    WBC, UA    n    Nitrite, UA    n    Protein, UA    n    Glucose, UA    n    Ketones, UA    n    Urobilinogen, UA    n    Bilirubin, UA    n    Blood, UA    n            Instructions    1) Bladder health  --Empty bladder every 3 hours.  Empty well: wait a minute, lean " forward on toilet.    --Avoid dietary irritants (see sheet).  Keep diary x 3-5 days to determine your irritants.    --URGE: oxybutynin, trospium cause constipation; myrbetriq did not help (but she thinks she was still taking biotin).  Uses uribel intermittently.  Use uribel post coital and any time you are having urgency.  --continue myrbetriq    2)  IC  --Recommend Prelief to help alkinize the urine:   --Prelief Tablets : Each tablet contains 345 mg calcium glycerophosphate (65 mg of elemental calcium). The tablets also contain 0.25% magnesium  stearate as a processing aid. Two tablets are equivalent to 690 mg calcium glycerophosphate (130mg of elemental calcium).   --Prelief Powder : Each ¼ teaspoon usage of powder is comparable to two tablets. The powder dissolves rapidly in food or non-alcoholic beverages.  Tablets are recommended for taking with alcoholic beverages   --Usage:     --Tablets: 2-3 tablets, 2 times a day.    --Powder: ¼ teaspoon of powder 2 times a day. More can be used when needed     --Hydrodistention With Cystoscopy: allows physicians to take a much closer look at the bladder wall. While the AUA no longer suggests that it be used as a diagnostic method (unless a diagnosis is in doubt), hydrodistention may provide modest relief in IC symptoms which can last from three to six months.   --continue start pelvic floor PT      3)Vaginal atrophy (dryness):  Did not tolerate premarin  --trial of osphena  --will get a call for osphena at home in [place]    4)Constipation controlled- continue fiber    5)[facility] 07/2017 for annual       Language Assistance Services     ATTENTION: Language assistance services are available, free of charge. Please call [phone].      ATENCIÓN: Si dulce oliveiraarik, tiene a wells disposición servicios gratuitos de asistencia lingüística. Llame al [phone].     JIL Ý: N?u b?n nói Ti?ng Vi?t, có các d?ch v? h? tr? ngôn ng? mi?n phí dành cho b?n. G?i s? [phone].          [facility] complies with applicable Federal civil rights laws and does not discriminate on the basis of race, color, national origin, age, disability, or sex.

## 2017-02-14 ENCOUNTER — CLINICAL SUPPORT (OUTPATIENT)
Dept: REHABILITATION | Facility: HOSPITAL | Age: 52
End: 2017-02-14
Attending: FAMILY MEDICINE
Payer: COMMERCIAL

## 2017-02-14 DIAGNOSIS — M62.89 PELVIC FLOOR DYSFUNCTION: Primary | ICD-10-CM

## 2017-02-14 PROCEDURE — 97140 MANUAL THERAPY 1/> REGIONS: CPT | Mod: PN

## 2017-02-14 PROCEDURE — 97110 THERAPEUTIC EXERCISES: CPT | Mod: PN

## 2017-02-14 NOTE — PROGRESS NOTES
"Patient: Tara [facility] #: [id number]   Diagnosis:   Encounter Diagnosis   Name Primary?    Pelvic floor dysfunction Yes      Date of start of care: 1/5/17  Date of treatment: 02/14/2017   Time in: 8:05  Time out: 9:05  Total treatment time: 60 minutes  # Visits: 5/20  Auth expiration: 12/31/17  POC expiration: 3/30/17  Outpatient Physical Therapy   Daily Note    Subjective     Pt states she had to miss her last session due to two deaths in the family. She reports she has been able to complete her home exercises nevertheless and things are going well. She states that it seem easier to drop fully during kegals in the morning because she is more relaxed. Pt states that she saw Leona Broussard the week before last. She encouraged pt to take Amitriptyline, which she had initially tried but was on a lot of other meds at the time and was having constipation and states that it made her too drowsy. She was told she can take it PRN so she tried it Sunday night when she was flared but still had a lot of pain. She also told Leona that when she used the vaginal cream that she still had a lot of pain afterwards. She was told there is a pill that just came out that treats vaginal dryness that does not contain hormones; pt is waiting for this medication to arrive.    Pain: Current: pt reports she is having a little flare up that started last week. She worked out Saturday morning and worked in the yard for a few hours afterwards. Sunday was "kind of bad," but I went out and worked in the yard a bit anyway and then decided that was enough and stopped.   More urgency/vaginal pain today 4/10, but it is intermittent; will get pain after eating/drinking then go to the bathroom and relieve it.    Objective     TREATMENT    Pt was instructed in therapeutic exercise for 15 minutes including:    - Wall squat stretch x 3 min  - Supine adductor stretch with LE support x 3 min    Pt was instructed in neuromuscular " reeducation for 0 minutes including:    Not performed:  - PT guided relaxation training with SEMG assist; pt demonstrated initial elevated resting baseline but able to achieve normal resting baseline post relaxation training  -  10 x 5''kegals with 10'' rest in supine with SEMG assist; pt demonstrates normal resting baseline, good WR rise, fair holding ability, slow derecruitment  - 5 x 5'' TA activation with SEMG; pt demonstrates moderate carry over to her PFM with incomplete derecruitment of PFM between contractions  - Progressive muscle relaxation (body scan)    Pt received the following manual therapy techniques for 45 min: myofascial release and mobilization were applied to entire abdominal region with restrictions noted in the lower right quadrant noted today with good tissue response to MFR    Not performed: strain counterstrain applied to R iliacus; soft tissue mobilization applied to R levators; soft tissue and myofascial mobilization applied to R obturator internus with sustained stretch; Renetta was noted to have increased tightness on the right today, including R obturator and right abdominal region; R levators noted to have atrophy compared to L    Education: Discussed functional status, progress and POC with patient. Pt was instructed to continue with current HEP consisting of diaphragmatic breathing, calm seema, kegals, and piriformis stretch with addition of wall squat and supine adductor stretch; she verbalized and demonstrated understanding and was provided with a handout. Tara demonstrated and verbalized understanding of all instruction given.    CMS Impairment/Limitation/Restriction for Urinary Problem Survey (1/24/17)  Status Limitation G-Code CMS Severity Modifier  Intake 72% 28% Current Status CJ - At least 20 percent but less than 40 percent  Assessment     Pt tolerated session well without visible adverse effects. Tara was noted to have myofascial restrictions in her lower right abdomen  today that decreased following manual therapy; will continue to monitor as we progress. Will re-assess internally at pt's next visit and provide treatment as appropriate. Will also discuss use of and potentially incorporate STM-10 unit into tx plan for pain and will progress therapeutic exercise/stretching program and mindfulness training. Tara will continue to benefit from skilled PT intervention to maximize pain free functional independence.  Pt is making good progress towards established goals. No spiritual, cultural, or educational needs identified.    Address goals next visit    Updated GOALS  Short Term Goals: 1 month (2/2/17)  1. Pt will verbalize improved awareness of PFM activity as palpated by PT in order to improve activity involvement with HEP.  2. Pt will tolerate HEP to improve impairments and independence with ADL's.  3. Pt will demonstrate ability to perform kegal on demand with decreased use of overflow mm and without breath holding. Goal added 1/17/17     Long Term Goals: 3 months (3/30/17)  1. Pt will be able to tolerate sitting for 30-60 minutes without exacerbation of symptoms in order to improve functional independence and work tolerance.  2. Pt will be able to perform exercise routine developed with PT in order to be able to exercise without exacerbation of symptoms to enhance QOL/for stress relief.  3. Pt will be independent with HEP and self management.  4. Pt will increase PFM strength to 3/5 with ability to hold for 5-10 seconds without exacerbation of symptoms in order to improve functional strength and tolerance to exercise. Goal added 1/17/17    Plan     Continue with established POC, working toward PT goals.

## 2017-02-21 ENCOUNTER — CLINICAL SUPPORT (OUTPATIENT)
Dept: REHABILITATION | Facility: HOSPITAL | Age: 52
End: 2017-02-21
Attending: FAMILY MEDICINE
Payer: COMMERCIAL

## 2017-02-21 DIAGNOSIS — M62.89 PELVIC FLOOR DYSFUNCTION: Primary | ICD-10-CM

## 2017-02-21 PROCEDURE — 97140 MANUAL THERAPY 1/> REGIONS: CPT | Mod: PN

## 2017-02-21 PROCEDURE — 97110 THERAPEUTIC EXERCISES: CPT | Mod: PN

## 2017-02-21 NOTE — PATIENT INSTRUCTIONS
Home Exercise Program: 02/21/2017      INSTRUCTIONS FOR CONTROLLING URINARY URGE      WHEN YOU EXPERIENCE A STRONG URGE TO URINATE:    FIRST Stop activity, stand quietly or sit down. Try to stay very still to maintain control. Avoid rushing to the toilet.    SECOND Begin Quick Flicks (1 second LIFT of pelvic floor muscles, 4 second DROP). Pelvic floor contractions send a message to the bladder to relax and hold urine.     THIRD Relax. Do not rush to the toilet. Take a deep belly or diaphragmatic breath and let it out slowly. Let the urge to urinate pass by using distraction techniques and positive thoughts. Try not to think about going to the bathroom.    FINALLY If the urge returns, repeat the above steps to regain control. When you feel the urge subside, walk normally to the bathroom. You can urinate once the urge has subsided.          Urge feeling!      Stop and be still.   Do NOT rush to       Think positively.           Begin Quick Flicks.   the toilet.       Distract yourself.

## 2017-02-21 NOTE — PROGRESS NOTES
Patient: Tara Arambula [facility] #: [id number]   Diagnosis:   Encounter Diagnosis   Name Primary?    Pelvic floor dysfunction Yes      Date of start of care: 1/5/17  Date of treatment: 02/21/2017   Time in: 8:06  Time out: 9:00  Total treatment time: 54 minutes  # Visits: 6/20  Auth expiration: 12/31/17  POC expiration: 3/30/17  Outpatient Physical Therapy   Updated Progress Note/POC    Subjective     Pt states that the last few days have been good and she is not currently flaring. She states she felt much better after our last session. Pt also reports that she did a lot of walking on [place] and felt tight/painful afterwards but performed her stretches and felt much better after that. Pt states she did get up a few times last night to use the bathroom but also drank a lot of water later in the day. Pt denies pain today.    Objective     Pt noted to have improved PFM strength, endurance and coordination with 3/5 strength bilaterally (L slightly stronger than R); good holding ability, good isolation of PFM  Pt continues to demonstrate slow but complete derecruitment    TREATMENT    Pt was instructed in therapeutic exercise for 40 minutes including:    - 10 x 10'' kegals in supine; pt demonstrates good recruitment, good holding ability, slow but complete derecruitment for 8 reps, incomplete derecruitment noted for final two reps, likely due to muscle fatigue  - 5 x 2'' kegals in supine; pt instructed to and able to drop fully before performing next kegals    Pt received the following manual therapy techniques for 14 min: soft tissue mobilization with sustained stretch applied to R OI today due to notable tissue tightness; contract relax applied to R OI; pt with good response to manual care    Not performed: strain counterstrain applied to R iliacus; soft tissue mobilization applied to R levators; soft tissue and myofascial mobilization applied to R obturator internus with sustained  stretch; Renetta was noted to have increased tightness on the right today, including R obturator and right abdominal region; R levators noted to have atrophy compared to L    Education: Discussed functional status, progress and POC with patient. Pt was instructed to continue with current HEP consisting of diaphragmatic breathing, calm seema, kegals, piriformis stretch, wall squat, and supine adductor stretch; she was instructed to increase PFM contraction to 10'' hold. We also discussed using quick flicks for bladder suppression as needed; Tara verbalized understanding and was provided with a handout. Pt asked about performance of squats in her exercise class, we touched briefly on how to engage the core to correctly perform a squat, will continue with discussion/instruction/performance at her next visit. Tara demonstrated and verbalized understanding of all instruction given.    CMS Impairment/Limitation/Restriction for Urinary Problem Survey  Status Limitation G-Code CMS Severity Modifier  Intake 72% 28%  2/21/2017 72% 28% Current Status CJ - At least 20 percent but less than 40 percent  Assessment     Pt tolerated session well without visible adverse effects. Tara demonstrates good progress with improvement in PFM awareness, strength, endurance, coordination, and specificity. Progressed HEP today due to good pt progress. Tara also demonstrates good compliance with her HEP and ability to utilize HEP for self management. Will continue to provide abdominal care as appropriate and to progress PFM program. Will address pt's question concerning squatting at the gym in more detail at her next visit and potentially incorporate STM-10 unit into tx plan for pain pending pt's report. Will continue to progress therapeutic exercise/stretching program and mindfulness training, both of which pt verbalizes have been helping. Plan to decrease frequency of visits to 2x/month pending pt's continued progress. Tara will  continue to benefit from skilled PT intervention to maximize pain free functional independence.  Pt is making good progress towards established goals. No spiritual, cultural, or educational needs identified.    Updated GOALS  Short Term Goals: 1 month (2/2/17)  1. Pt will verbalize improved awareness of PFM activity as palpated by PT in order to improve activity involvement with HEP. MET 2/21/17  2. Pt will tolerate HEP to improve impairments and independence with ADL's. MET 2/21/17  3. Pt will demonstrate ability to perform kegal on demand with decreased use of overflow mm and without breath holding. Goal added 1/17/17 MET 2/21/17     Long Term Goals: 3 months (3/30/17)  1. Pt will be able to tolerate sitting for 30-60 minutes without exacerbation of symptoms in order to improve functional independence and work tolerance.  2. Pt will be able to perform exercise routine developed with PT in order to be able to exercise without exacerbation of symptoms to enhance QOL/for stress relief.  3. Pt will be independent with HEP and self management.  4. Pt will increase PFM strength to 3/5 with ability to hold for 5-10 seconds without exacerbation of symptoms in order to improve functional strength and tolerance to exercise. Goal added 1/17/17 MET 2/21/17    Plan     Continue with established POC, working toward PT goals.

## 2017-02-21 NOTE — PLAN OF CARE
Patient: Tara Arambula [facility] #: [id number]   Diagnosis:   Encounter Diagnosis   Name Primary?    Pelvic floor dysfunction Yes      Date of start of care: 1/5/17  Date of treatment: 02/21/2017   Time in: 8:06  Time out: 9:00  Total treatment time: 54 minutes  # Visits: 6/20  Auth expiration: 12/31/17  POC expiration: 3/30/17  Outpatient Physical Therapy   Updated Progress Note/POC    Subjective     Pt states that the last few days have been good and she is not currently flaring. She states she felt much better after our last session. Pt also reports that she did a lot of [place] and felt tight/painful afterwards but performed her stretches and felt much better after that. Pt states she did get up a few times last night to use the bathroom but also drank a lot of water later in the day. Pt denies pain today.    Objective     Pt noted to have improved PFM strength, endurance and coordination with 3/5 strength bilaterally (L slightly stronger than R); good holding ability, good isolation of PFM  Pt continues to demonstrate slow but complete derecruitment    TREATMENT    Pt was instructed in therapeutic exercise for 40 minutes including:    - 10 x 10'' kegals in supine; pt demonstrates good recruitment, good holding ability, slow but complete derecruitment for 8 reps, incomplete derecruitment noted for final two reps, likely due to muscle fatigue  - 5 x 2'' kegals in supine; pt instructed to and able to drop fully before performing next kegals    Pt received the following manual therapy techniques for 14 min: soft tissue mobilization with sustained stretch applied to R OI today due to notable tissue tightness; contract relax applied to R OI; pt with good response to manual care    Not performed: strain counterstrain applied to R iliacus; soft tissue mobilization applied to R levators; soft tissue and myofascial mobilization applied to R obturator internus with sustained  stretch; Renetta was noted to have increased tightness on the right today, including R obturator and right abdominal region; R levators noted to have atrophy compared to L    Education: Discussed functional status, progress and POC with patient. Pt was instructed to continue with current HEP consisting of diaphragmatic breathing, calm seema, kegals, piriformis stretch, wall squat, and supine adductor stretch; she was instructed to increase PFM contraction to 10'' hold. We also discussed using quick flicks for bladder suppression as needed; Tara verbalized understanding and was provided with a handout. Pt asked about performance of squats in her exercise class, we touched briefly on how to engage the core to correctly perform a squat, will continue with discussion/instruction/performance at her next visit. Tara demonstrated and verbalized understanding of all instruction given.    CMS Impairment/Limitation/Restriction for Urinary Problem Survey  Status Limitation G-Code CMS Severity Modifier  Intake 72% 28%  2/21/2017 72% 28% Current Status CJ - At least 20 percent but less than 40 percent  Assessment     Pt tolerated session well without visible adverse effects. Tara demonstrates good progress with improvement in PFM awareness, strength, endurance, coordination, and specificity. Progressed HEP today due to good pt progress. Tara also demonstrates good compliance with her HEP and ability to utilize HEP for self management. Will continue to provide abdominal care as appropriate and to progress PFM program. Will address pt's question concerning squatting at the gym in more detail at her next visit and potentially incorporate STM-10 unit into tx plan for pain pending pt's report. Will continue to progress therapeutic exercise/stretching program and mindfulness training, both of which pt verbalizes have been helping. Plan to decrease frequency of visits to 2x/month pending pt's continued progress. Tara will  continue to benefit from skilled PT intervention to maximize pain free functional independence.  Pt is making good progress towards established goals. No spiritual, cultural, or educational needs identified.    Updated GOALS  Short Term Goals: 1 month (2/2/17)  1. Pt will verbalize improved awareness of PFM activity as palpated by PT in order to improve activity involvement with HEP. MET 2/21/17  2. Pt will tolerate HEP to improve impairments and independence with ADL's. MET 2/21/17  3. Pt will demonstrate ability to perform kegal on demand with decreased use of overflow mm and without breath holding. Goal added 1/17/17 MET 2/21/17     Long Term Goals: 3 months (3/30/17)  1. Pt will be able to tolerate sitting for 30-60 minutes without exacerbation of symptoms in order to improve functional independence and work tolerance.  2. Pt will be able to perform exercise routine developed with PT in order to be able to exercise without exacerbation of symptoms to enhance QOL/for stress relief.  3. Pt will be independent with HEP and self management.  4. Pt will increase PFM strength to 3/5 with ability to hold for 5-10 seconds without exacerbation of symptoms in order to improve functional strength and tolerance to exercise. Goal added 1/17/17 MET 2/21/17    Plan     Continue with established POC, working toward PT goals.     I agree with the above plan of care.  Leona Broussard, GILDARDO-BC

## 2017-02-27 ENCOUNTER — CLINICAL SUPPORT (OUTPATIENT)
Dept: REHABILITATION | Facility: HOSPITAL | Age: 52
End: 2017-02-27
Attending: FAMILY MEDICINE
Payer: COMMERCIAL

## 2017-02-27 DIAGNOSIS — M62.89 PELVIC FLOOR DYSFUNCTION: Primary | ICD-10-CM

## 2017-02-27 PROCEDURE — 97110 THERAPEUTIC EXERCISES: CPT | Mod: PN

## 2017-02-27 PROCEDURE — 97140 MANUAL THERAPY 1/> REGIONS: CPT | Mod: PN

## 2017-02-27 NOTE — PROGRESS NOTES
Patient: Tara [facility] #: [id number]   Diagnosis:   Encounter Diagnosis   Name Primary?    Pelvic floor dysfunction Yes      Date of start of care: 1/5/17  Date of treatment: 02/27/2017   Time in: 9:04  Time out: 10:06  Total treatment time: 62 minutes  # Visits: 7/20  Auth expiration: 12/31/17  POC expiration: 3/30/17  Outpatient Physical Therapy   Progress Note    Subjective     Pt reports that she is flaring, probably due to being out for parades over the weekend - not eating her regular diet and being on her feet a lot. She reports that she was able to keep up with her HEP daily despite being busy, generally twice a day, once yesterday. Tara rates her flare up over the weekend as 7/10, and is 4-5/10 currently.    Objective       TREATMENT    Pt was instructed in therapeutic exercise for 15 minutes including:    - MET applied to correct L posterior inominant rotation (push pull and shotgun techniques)  - Adductor squeeze 10 x 10'' in supine  - Supine clams RTB x 10    Pt received the following manual therapy techniques for 45 min: soft tissue mobilization applied to abdominal region; soft tissue mobilization applied to bilateral levators and obturators with sustain stretch of R OI    Education: Discussed functional status, progress and POC with patient. Pt was instructed to continue with current HEP consisting of diaphragmatic breathing, calm seema, kegals, piriformis stretch, wall squat, and supine adductor stretch with addition of adductor squeeze and clams. Tara verbalized understanding and was provided with a handout. Tara demonstrated and verbalized understanding of all instruction given.      CMS Impairment/Limitation/Restriction for Urinary Problem Survey  Status Limitation G-Code CMS Severity Modifier  Intake 72% 28%  2/21/2017 72% 28% Current Status CJ - At least 20 percent but less than 40 percent  Assessment     Pt tolerated session well without visible adverse effects. Tara  came to today's session flared due to festivities over the weekend. She reports that it was not as bad/as high as it has been in the past when she has flared. Pt continues to make good progress overall with improvements in myofascial and soft tissue restrictions noted today with manual care. Will continue to progress therapeutic exercise/stretching program and mindfulness training, both of which pt verbalizes have been helping. Plan to decrease frequency of visits to 2x/month pending pt's continued progress. Tara will continue to benefit from skilled PT intervention to maximize pain free functional independence.  Pt is making good progress towards established goals. No spiritual, cultural, or educational needs identified.    Updated GOALS  Short Term Goals: 1 month (2/2/17)  1. Pt will verbalize improved awareness of PFM activity as palpated by PT in order to improve activity involvement with HEP. MET 2/21/17  2. Pt will tolerate HEP to improve impairments and independence with ADL's. MET 2/21/17  3. Pt will demonstrate ability to perform kegal on demand with decreased use of overflow mm and without breath holding. Goal added 1/17/17 MET 2/21/17     Long Term Goals: 3 months (3/30/17)  1. Pt will be able to tolerate sitting for 30-60 minutes without exacerbation of symptoms in order to improve functional independence and work tolerance.  2. Pt will be able to perform exercise routine developed with PT in order to be able to exercise without exacerbation of symptoms to enhance QOL/for stress relief.  3. Pt will be independent with HEP and self management.  4. Pt will increase PFM strength to 3/5 with ability to hold for 5-10 seconds without exacerbation of symptoms in order to improve functional strength and tolerance to exercise. Goal added 1/17/17 MET 2/21/17    Plan     Continue with established POC, working toward PT goals.

## 2017-03-15 ENCOUNTER — CLINICAL SUPPORT (OUTPATIENT)
Dept: REHABILITATION | Facility: HOSPITAL | Age: 52
End: 2017-03-15
Attending: FAMILY MEDICINE
Payer: COMMERCIAL

## 2017-03-15 DIAGNOSIS — M62.89 PELVIC FLOOR DYSFUNCTION: Primary | ICD-10-CM

## 2017-03-15 PROCEDURE — 97140 MANUAL THERAPY 1/> REGIONS: CPT | Mod: PN

## 2017-03-15 PROCEDURE — 97110 THERAPEUTIC EXERCISES: CPT | Mod: PN

## 2017-03-15 NOTE — PROGRESS NOTES
Patient: Tara [facility] #: [id number]   Diagnosis:   Encounter Diagnosis   Name Primary?    Pelvic floor dysfunction Yes      Date of start of care: 1/5/17  Date of treatment: 03/15/2017   Time in: 8:03  Time out: 9:07  Total treatment time: 64 minutes  # Visits: 8/20  Auth expiration: 12/31/17  POC expiration: 3/30/17  Outpatient Physical Therapy   Progress Note    Subjective     Pt reports that she has been doing well since [place] with a little bit of flaring last week but no pain for the past few days and when she was flaring it wasn't that bad. She still continues to get up to go to the bathroom at night but only once last night and states this is probably more of a habit now than her bladder actually waking her up (hot flashes will wake her up and she will decide to use the bathroom since she is already awake). Pt reports 0/10 pain today.    Objective       TREATMENT    Pt was instructed in therapeutic exercise for 30 minutes including:    - 5 x 5'' kegals in supine; pt with increased tightness on the R with decreased strength with improvement noted in tissue extensibility and strength following manual care  - Pt education regarding fluid intake, diet, vaginal dryness; pt verbalized hesitation regarding taking new medication for vaginal dryness today (pt reports that she uses lubrication for intercourse; she has been prescribed Osaphena)    Pt received the following manual therapy techniques for 30 min: soft tissue mobilization applied to bilateral levators and obturators with sustained stretch of R OI; myofascial mobilization applied R OI and levators    Pt received TENS for 10 min following tx: premod, continuous; electrodes placed in suprapubic region anteriorly and over sacral nerve roots posteriorly    Education: Discussed functional status, progress and POC with patient. Pt was instructed to continue with current HEP consisting of diaphragmatic breathing, calm seema, kegals, piriformis  stretch, wall squat, supine adductor stretch, adductor squeeze and clams. Tara verbalized understanding. Tara demonstrated and verbalized understanding of all instruction given.    CMS Impairment/Limitation/Restriction for Urinary Problem Survey  Status Limitation G-Code CMS Severity Modifier  Intake 72% 28%  2/21/2017 72% 28% Current Status CJ - At least 20 percent but less than 40 percent    Assessment     Pt tolerated session well without visible adverse effects. Tara came in feeling better compared to last session with minimal flaring since [place]ardi [place] ended. She has made good progress with improvements noted in PFM tissue quality and extensibility and strength and well as improvement in abdominal soft tissue restrictions. She continues to exhibit tightness and weakness on the R side, OI > levators. Will continue to progress therapeutic exercise/stretching program and mindfulness training, both of which pt verbalizes have continued to help. Tara will continue to benefit from skilled PT intervention to maximize pain free functional independence.  Pt is making good progress towards established goals. No spiritual, cultural, or educational needs identified.    Updated GOALS  Short Term Goals: 1 month (2/2/17)  1. Pt will verbalize improved awareness of PFM activity as palpated by PT in order to improve activity involvement with HEP. MET 2/21/17  2. Pt will tolerate HEP to improve impairments and independence with ADL's. MET 2/21/17  3. Pt will demonstrate ability to perform kegal on demand with decreased use of overflow mm and without breath holding. Goal added 1/17/17 MET 2/21/17     Long Term Goals: 3 months (3/30/17)  1. Pt will be able to tolerate sitting for 30-60 minutes without exacerbation of symptoms in order to improve functional independence and work tolerance.  2. Pt will be able to perform exercise routine developed with PT in order to be able to exercise without exacerbation of symptoms to  enhance QOL/for stress relief.  3. Pt will be independent with HEP and self management.  4. Pt will increase PFM strength to 3/5 with ability to hold for 5-10 seconds without exacerbation of symptoms in order to improve functional strength and tolerance to exercise. Goal added 1/17/17 MET 2/21/17    Plan     Continue with established POC, working toward PT goals.

## 2017-03-21 ENCOUNTER — TELEPHONE (OUTPATIENT)
Dept: UROGYNECOLOGY | Facility: CLINIC | Age: 52
End: 2017-03-21

## 2017-03-21 RX ORDER — METHENAMINE, SODIUM PHOSPHATE, MONOBASIC, MONOHYDRATE, PHENYL SALICYLATE, METHYLENE BLUE, AND HYOSCYAMINE SULFATE 118; 40.8; 36; 10; .12 MG/1; MG/1; MG/1; MG/1; MG/1
1 CAPSULE ORAL 4 TIMES DAILY PRN
Qty: 20 CAPSULE | Refills: 11 | Status: SHIPPED | OUTPATIENT
Start: 2017-03-21 | End: 2018-08-26

## 2017-03-21 NOTE — TELEPHONE ENCOUNTER
Rx for uribel brand name sent to pharmacy.  Patient instructed on printing uribel coupon code.  LUCIANO MccarthyP-BC

## 2017-03-21 NOTE — TELEPHONE ENCOUNTER
----- Message from Thom Castelan sent at 3/21/2017 10:29 AM CDT -----  Contact: Pt  X_ 1st Request  _ 2nd Request  _ 3rd Request    Who:LOTTIE ESCUDERO [[id number]]    Why: Patient would like to speak with staff in regards to gettinng another medicine for methen-m.blue-s.phos-phsal-hyo (URIBEL) [id number] mg Cap     What Number to Call Back: [phone]    When to Expect a call back: (Before the end of the day)  -- if call after 3:00 call back will be tomorrow.

## 2017-03-21 NOTE — TELEPHONE ENCOUNTER
Pt states her  phenabopyridine is covered by her and Uribel is now $375 and with a discount card $250.   I informed the pt I'd relay this message to DEYA Broussard and one of us would get back to her. Pt voiced understanding and call ended.

## 2017-03-22 ENCOUNTER — CLINICAL SUPPORT (OUTPATIENT)
Dept: REHABILITATION | Facility: HOSPITAL | Age: 52
End: 2017-03-22
Attending: FAMILY MEDICINE
Payer: COMMERCIAL

## 2017-03-22 DIAGNOSIS — M62.89 PELVIC FLOOR DYSFUNCTION: Primary | ICD-10-CM

## 2017-03-22 PROCEDURE — 97140 MANUAL THERAPY 1/> REGIONS: CPT | Mod: PN

## 2017-03-22 PROCEDURE — 97110 THERAPEUTIC EXERCISES: CPT | Mod: PN

## 2017-03-22 NOTE — PROGRESS NOTES
"Patient: Tara [facility] #: [id number]   Diagnosis:   Encounter Diagnosis   Name Primary?    Pelvic floor dysfunction Yes      Date of start of care: 1/5/17  Date of treatment: 03/22/2017   Time in: 7:04  Time out: 8:10  Total treatment time: 66 minutes  # Visits: 9/20  Auth expiration: 12/31/17  POC expiration: 3/30/17  Outpatient Physical Therapy   Progress Note    Subjective     Pt reports that she did have some cramping-like discomfort since our last session and started flaring on Friday. She states that she did yoga last night and did feel worse afterwards but was then drinking lots of water and cooking so was on her feet - not really sure what does it, "still haven't figured it out;" (i.e. Not sure if it was the yoga or the activities following or the combination). But once she is flaring she's flaring so she's not sure if it matters. Pt states she is also having some issues with constipation lately; she drank a detox tea which did help her go to the bathroom but isn't sure if maybe the tea had something to do with the IC flaring. Pt states that the Uribel continues to help.    Pt reports 3/10 pain today.    Objective       TREATMENT    Pt was instructed in therapeutic exercise for 10 minutes including:    - Pt education regarding importance of diet on constipation and IC/discussion of pt's current diet and modifications and potential benefit of working with a nutritionist to optimize effect of diet and fluid intake on pt's symptoms; may administer bowel diary     Pt received the following manual therapy techniques for 40 min: soft tissue mobilization applied to abdomen; colon massage with instructions in self colon massage    Pt received TENS for 10 min following tx: premod, continuous, 6.6 V (suprapubic), 4.5 V (sacral spinal roots)     Education: Discussed functional status, progress and POC with patient. Pt was instructed to continue with current HEP consisting of diaphragmatic breathing, calm " seema, kegals, piriformis stretch, wall squat, supine adductor stretch, adductor squeeze and clams; added colon massage today with handout. Tara verbalized understanding. Tara demonstrated and verbalized understanding of all instruction given.    CMS Impairment/Limitation/Restriction for Urinary Problem Survey  Status Limitation G-Code CMS Severity Modifier  Intake 72% 28%  2/21/2017 72% 28% Current Status CJ - At least 20 percent but less than 40 percent    Assessment     Pt tolerated session well without visible adverse effects. Focused on abdomen and abdominal care today and instruction in/educaiton on self colon massage. We also discussed role of diet and fluid intake on IC and on constipation and possible avenues for proceeding including possibility of working with a nutritionist/completing a bowel diary. Tara verbalized understanding of all education provided today. Will continue to progress PT interventions including therapeutic exercise/stretching program and mindfulness training, both of which pt verbalizes have continued to help. Tara will continue to benefit from skilled PT intervention to maximize pain free functional independence.  Pt is making good progress towards established goals. No spiritual, cultural, or educational needs identified.    Updated GOALS  Short Term Goals: 1 month (2/2/17)  1. Pt will verbalize improved awareness of PFM activity as palpated by PT in order to improve activity involvement with HEP. MET 2/21/17  2. Pt will tolerate HEP to improve impairments and independence with ADL's. MET 2/21/17  3. Pt will demonstrate ability to perform kegal on demand with decreased use of overflow mm and without breath holding. Goal added 1/17/17 MET 2/21/17     Long Term Goals: 3 months (3/30/17)  1. Pt will be able to tolerate sitting for 30-60 minutes without exacerbation of symptoms in order to improve functional independence and work tolerance.  2. Pt will be able to perform  exercise routine developed with PT in order to be able to exercise without exacerbation of symptoms to enhance QOL/for stress relief.  3. Pt will be independent with HEP and self management.  4. Pt will increase PFM strength to 3/5 with ability to hold for 5-10 seconds without exacerbation of symptoms in order to improve functional strength and tolerance to exercise. Goal added 1/17/17 MET 2/21/17    Plan     Continue with established POC, working toward PT goals.

## 2017-03-28 ENCOUNTER — PATIENT MESSAGE (OUTPATIENT)
Dept: UROGYNECOLOGY | Facility: CLINIC | Age: 52
End: 2017-03-28

## 2017-04-06 ENCOUNTER — CLINICAL SUPPORT (OUTPATIENT)
Dept: REHABILITATION | Facility: HOSPITAL | Age: 52
End: 2017-04-06
Attending: NURSE PRACTITIONER
Payer: COMMERCIAL

## 2017-04-06 DIAGNOSIS — M62.89 PELVIC FLOOR DYSFUNCTION: Primary | ICD-10-CM

## 2017-04-06 PROCEDURE — 97110 THERAPEUTIC EXERCISES: CPT | Mod: PN

## 2017-04-06 NOTE — PROGRESS NOTES
Patient: Tara [facility] #: [id number]   Diagnosis:   Encounter Diagnosis   Name Primary?    Pelvic floor dysfunction Yes      Date of start of care: 1/5/17  Date of treatment: 04/06/2017   Time in: 12:14  Time out: 1:08  Total treatment time: 54 minutes  # Visits: 10/20  Auth expiration: 12/31/17  POC expiration: 6/30/17    FOTO next visit  Outpatient Physical Therapy   Updated POC    Subjective     Pt reports that things are pretty good in terms of her IC. She has been having constipation and states that she thinks she should probably see a GI doctor and suspects she might have IBS, her stomach has been very irritated with lots of cramping. However, she states that her IC has been good and she has stopped flaring. Tara reports that she has been eating Activia probiotic yogurt to improve her gut health and has also tried Cirtocel powder but has not taken that in a week, thinking she might be getting too much fiber. Pt states she has not had any Activia for the past few days as she has been out of town with her sister in [facility].    Pt reports 0/10 pain today.    Objective       TREATMENT    Pt was instructed in therapeutic exercise for 54 minutes including:    - 10 quick flicks; pt with normal resting baseline, good WR rise, complete derecruitment  - 5 x 10'' kegals in supine; normal resting baseline, good holding ability, strong 3/5 contraction with good coordination and isolation  - Pt education regarding mechanics and physiology of defecation, optimal body mechanics, and bladder/bowel diary    Pt received the following manual therapy techniques for 0 min: soft tissue mobilization applied to abdomen; colon massage with instructions in self colon massage    Pt received TENS for 0 min following tx: premod, continuous, 6.6 V (suprapubic), 4.5 V (sacral spinal roots) - NP    Education: Discussed functional status, progress and POC with patient. Pt was instructed to continue with current HEP consisting of  diaphragmatic breathing, calm seema, kegals, piriformis stretch, wall squat, supine adductor stretch, adductor squeeze and clams and colon massage. Tara verbalized understanding. Administered bladder/bowel diary today with instructions. Tara demonstrated and verbalized understanding of all instruction given.    CMS Impairment/Limitation/Restriction for Urinary Problem Survey  Status Limitation G-Code CMS Severity Modifier  Intake 72% 28%  2/21/2017 72% 28% Current Status CJ - At least 20 percent but less than 40 percent    Assessment     Pt tolerated session well without visible adverse effects. Pt has made good progress with her PFM function including improvement in strength, endurance, and soft tissue mobility/quality of tissue. She continues to display some weakness, though much improved since her eval with good awareness, coordination, and isolation of her PFM and abdominals. Pt also with improvements in abdominal soft tissue restrictions though with continued tendency to display tightness and decreased mobility. Will continue to provide manual care as needed. Pt continues to report constipation and GI irritability. She was administered a bladder and bowel diary at today's visit; will review and discuss at her next session and make changes as needed/discuss alternatives for improving her bowel movements to [employer].   Will continue to progress PT interventions including therapeutic exercise/stretching program and mindfulness training, both of which pt verbalizes have continued to help. Tara will continue to benefit from skilled PT intervention to maximize pain free functional independence.  Pt is making good progress towards established goals. No spiritual, cultural, or educational needs identified.  Will review bladder/bowel diary and progress therapeutic exercise at next visit including assessment of yoga poses to optimize effect.     Updated GOALS  Short Term Goals: 1 month (2/2/17)  1. Pt will  verbalize improved awareness of PFM activity as palpated by PT in order to improve activity involvement with HEP. MET 2/21/17  2. Pt will tolerate HEP to improve impairments and independence with ADL's. MET 2/21/17  3. Pt will demonstrate ability to perform kegal on demand with decreased use of overflow mm and without breath holding. Goal added 1/17/17 MET 2/21/17     Long Term Goals: 3 months (continue to 6/30/17)  1. Pt will be able to tolerate sitting for 30-60 minutes without exacerbation of symptoms in order to improve functional independence and work tolerance.  2. Pt will be able to perform exercise routine developed with PT in order to be able to exercise without exacerbation of symptoms to enhance QOL/for stress relief.  3. Pt will be independent with HEP and self management.  4. Pt will increase PFM strength to 3/5 with ability to hold for 5-10 seconds without exacerbation of symptoms in order to improve functional strength and tolerance to exercise. Goal added 1/17/17 MET 2/21/17  5. Pt will demonstrate 4/5 PFM strength with ability to hold for 10 seconds 10 times in standing in order to improve functional strength and tolerance to exercise. Goal added 4/6/17  6. Pt will complete a bladder/bowel diary in order to improve urinary and bowel function/decrease symptoms to improve functional independence and ability to perform desired activities (i.e. Gardening and exercise). Goal added 4/6/17    Plan     Continue with established POC, working toward PT goals.

## 2017-04-11 NOTE — PLAN OF CARE
Patient: Tara [facility] #: [id number]   Diagnosis:   Encounter Diagnosis   Name Primary?    Pelvic floor dysfunction Yes      Date of start of care: 1/5/17  Date of treatment: 04/06/2017   Time in: 12:14  Time out: 1:08  Total treatment time: 54 minutes  # Visits: 10/20  Auth expiration: 12/31/17  POC expiration: 6/30/17    FOTO next visit  Outpatient Physical Therapy   Updated POC    Subjective     Pt reports that things are pretty good in terms of her IC. She has been having constipation and states that she thinks she should probably see a GI doctor and suspects she might have IBS, her stomach has been very irritated with lots of cramping. However, she states that her IC has been good and she has stopped flaring. Tara reports that she has been eating Activia probiotic yogurt to improve her gut health and has also tried Cirtocel powder but has not taken that in a week, thinking she might be getting too much fiber. Pt states she has not had any Activia for the past few days as she has been out of town with her sister in [place] [facility].    Pt reports 0/10 pain today.    Objective       TREATMENT    Pt was instructed in therapeutic exercise for 54 minutes including:    - 10 quick flicks; pt with normal resting baseline, good WR rise, complete derecruitment  - 5 x 10'' kegals in supine; normal resting baseline, good holding ability, strong 3/5 contraction with good coordination and isolation  - Pt education regarding mechanics and physiology of defecation, optimal body mechanics, and bladder/bowel diary    Pt received the following manual therapy techniques for 0 min: soft tissue mobilization applied to abdomen; colon massage with instructions in self colon massage    Pt received TENS for 0 min following tx: premod, continuous, 6.6 V (suprapubic), 4.5 V (sacral spinal roots) - NP    Education: Discussed functional status, progress and POC with patient. Pt was instructed to continue with current HEP consisting of  diaphragmatic breathing, calm seema, kegals, piriformis stretch, wall squat, supine adductor stretch, adductor squeeze and clams and colon massage. Tara verbalized understanding. Administered bladder/bowel diary today with instructions. Tara demonstrated and verbalized understanding of all instruction given.    CMS Impairment/Limitation/Restriction for Urinary Problem Survey  Status Limitation G-Code CMS Severity Modifier  Intake 72% 28%  2/21/2017 72% 28% Current Status CJ - At least 20 percent but less than 40 percent    Assessment     Pt tolerated session well without visible adverse effects. Pt has made good progress with her PFM function including improvement in strength, endurance, and soft tissue mobility/quality of tissue. She continues to display some weakness, though much improved since her eval with good awareness, coordination, and isolation of her PFM and abdominals. Pt also with improvements in abdominal soft tissue restrictions though with continued tendency to display tightness and decreased mobility. Will continue to provide manual care as needed. Pt continues to report constipation and GI irritability. She was administered a bladder and bowel diary at today's visit; will review and discuss at her next session and make changes as needed/discuss alternatives for improving her bowel movements to Citrocel.   Will continue to progress PT interventions including therapeutic exercise/stretching program and mindfulness training, both of which pt verbalizes have continued to help. Tara will continue to benefit from skilled PT intervention to maximize pain free functional independence.  Pt is making good progress towards established goals. No spiritual, cultural, or educational needs identified.  Will review bladder/bowel diary and progress therapeutic exercise at next visit including assessment of yoga poses to optimize effect.     Updated GOALS  Short Term Goals: 1 month (2/2/17)  1. Pt will  verbalize improved awareness of PFM activity as palpated by PT in order to improve activity involvement with HEP. MET 2/21/17  2. Pt will tolerate HEP to improve impairments and independence with ADL's. MET 2/21/17  3. Pt will demonstrate ability to perform kegal on demand with decreased use of overflow mm and without breath holding. Goal added 1/17/17 MET 2/21/17     Long Term Goals: 3 months (continue to 6/30/17)  1. Pt will be able to tolerate sitting for 30-60 minutes without exacerbation of symptoms in order to improve functional independence and work tolerance.  2. Pt will be able to perform exercise routine developed with PT in order to be able to exercise without exacerbation of symptoms to enhance QOL/for stress relief.  3. Pt will be independent with HEP and self management.  4. Pt will increase PFM strength to 3/5 with ability to hold for 5-10 seconds without exacerbation of symptoms in order to improve functional strength and tolerance to exercise. Goal added 1/17/17 MET 2/21/17  5. Pt will demonstrate 4/5 PFM strength with ability to hold for 10 seconds 10 times in standing in order to improve functional strength and tolerance to exercise. Goal added 4/6/17  6. Pt will complete a bladder/bowel diary in order to improve urinary and bowel function/decrease symptoms to improve functional independence and ability to perform desired activities (i.e. Gardening and exercise). Goal added 4/6/17    Plan     Continue with established POC, working toward PT goals.        I agree with the above plan of care.  Leona Broussard, GILDARDO-BC

## 2017-04-17 ENCOUNTER — CLINICAL SUPPORT (OUTPATIENT)
Dept: REHABILITATION | Facility: HOSPITAL | Age: 52
End: 2017-04-17
Attending: NURSE PRACTITIONER
Payer: COMMERCIAL

## 2017-04-17 DIAGNOSIS — M62.89 PELVIC FLOOR DYSFUNCTION: Primary | ICD-10-CM

## 2017-04-17 PROCEDURE — 97140 MANUAL THERAPY 1/> REGIONS: CPT | Mod: PN

## 2017-04-17 NOTE — PROGRESS NOTES
"Patient: Tara [facility] #: [id number]   Diagnosis:   Encounter Diagnosis   Name Primary?    Pelvic floor dysfunction Yes      Date of start of care: 1/5/17  Date of treatment: 04/17/2017   Time in: 8:08  Time out: 9:12  Total treatment time: 64 minutes  # Visits: 11/20  Auth expiration: 12/31/17  POC expiration: 6/30/17    Outpatient Physical Therapy   Updated POC    Subjective     Pt reports that she's pretty good; she states that she started flaring yesterday but it's not too bad and generally when she flares it hasn't been as bad. She reports that she did a lot of cooking yesterday for [employer] and was therefore standing a lot and unable to rest; she also painted her den over the weekend. Pt reports that she did have to get up 3 times to use the bathroom last night. She has started on Myrbetriq again and states that she will stay on it for a month to see if it will help (pt verbalizes understanding that medications can take some time to take effect). Pt reports that her constipation is better, "I feel like my stomach has been good."    Pt reports 0/10 pain today. Pt reports a little urgency but denies pain.    Objective       TREATMENT    Pt was instructed in therapeutic exercise for 0 minutes including:    Not performed:  - 10 quick flicks; pt with normal resting baseline, good WR rise, complete derecruitment  - 5 x 10'' kegals in supine; normal resting baseline, good holding ability, strong 3/5 contraction with good coordination and isolation  - Pt education regarding mechanics and physiology of defecation, optimal body mechanics, and bladder/bowel diary    Pt received the following manual therapy techniques for 55 min: MFR applied to low abdominal region and to diaphragm    Pt received TENS for 0 min following tx: premod, continuous, 6.6 V (suprapubic), 4.5 V (sacral spinal roots) - NP    Education: Discussed functional status, progress and POC with patient. Pt was instructed to continue with " current HEP consisting of diaphragmatic breathing, calm seema, kegals, piriformis stretch, wall squat, supine adductor stretch, adductor squeeze and clams and colon massage. Tara verbalized understanding. Tara demonstrated and verbalized understanding of all instruction given.    CMS Impairment/Limitation/Restriction for Urinary Problem Survey  Status Limitation G-Code CMS Severity Modifier  Intake 72% 28%  Predicted 8% 28% Goal Status+ CJ - At least 20 percent but less than 40 percent  2/21/2017 72% 28%  4/17/2017 60% 40% Current Status CK - At least 40 percent but less than 60 percent    Assessment     Pt tolerated session well without visible adverse effects. Pt with good tissue response to myofascial care provided to abdomen today; increase in bowel sounds heard during tx today and decrease if myofascial restrictions noted following tx. Pt arrived to today's session without her bladder diary; she was instructed to finish filling it out and to bring it to her next session in order to progress bladder retraining and to address fluid intake as needed. Pt has been doing well in therapy overall with continuation in ebb and flow to IC symptoms but verbalizing an overall improvement in degree of flaring when it does occur. Will review diary at pt's next visit and update POC accordingly. Will continue to progress PT interventions including therapeutic exercise/stretching program and mindfulness training, both of which pt verbalizes have continued to help. Tara will continue to benefit from skilled PT intervention to maximize pain free functional independence.  Pt is making good progress towards established goals. No spiritual, cultural, or educational needs identified.    Updated GOALS  Short Term Goals: 1 month (2/2/17)  1. Pt will verbalize improved awareness of PFM activity as palpated by PT in order to improve activity involvement with HEP. MET 2/21/17  2. Pt will tolerate HEP to improve impairments and  independence with ADL's. MET 2/21/17  3. Pt will demonstrate ability to perform kegal on demand with decreased use of overflow mm and without breath holding. Goal added 1/17/17 MET 2/21/17     Long Term Goals: 3 months (continue to 6/30/17)  1. Pt will be able to tolerate sitting for 30-60 minutes without exacerbation of symptoms in order to improve functional independence and work tolerance.  2. Pt will be able to perform exercise routine developed with PT in order to be able to exercise without exacerbation of symptoms to enhance QOL/for stress relief.  3. Pt will be independent with HEP and self management.  4. Pt will increase PFM strength to 3/5 with ability to hold for 5-10 seconds without exacerbation of symptoms in order to improve functional strength and tolerance to exercise. Goal added 1/17/17 MET 2/21/17  5. Pt will demonstrate 4/5 PFM strength with ability to hold for 10 seconds 10 times in standing in order to improve functional strength and tolerance to exercise. Goal added 4/6/17  6. Pt will complete a bladder/bowel diary in order to improve urinary and bowel function/decrease symptoms to improve functional independence and ability to perform desired activities (i.e. Gardening and exercise). Goal added 4/6/17    Plan     Continue with established POC, working toward PT goals.

## 2017-04-26 ENCOUNTER — CLINICAL SUPPORT (OUTPATIENT)
Dept: REHABILITATION | Facility: HOSPITAL | Age: 52
End: 2017-04-26
Attending: NURSE PRACTITIONER
Payer: COMMERCIAL

## 2017-04-26 DIAGNOSIS — M62.89 PELVIC FLOOR DYSFUNCTION: Primary | ICD-10-CM

## 2017-04-26 PROCEDURE — 97110 THERAPEUTIC EXERCISES: CPT | Mod: PN

## 2017-04-26 PROCEDURE — 97140 MANUAL THERAPY 1/> REGIONS: CPT | Mod: PN

## 2017-04-26 NOTE — PROGRESS NOTES
Patient: Tara Arambula [facility] #: [id number]   Diagnosis:   Encounter Diagnosis   Name Primary?    Pelvic floor dysfunction Yes      Date of start of care: 1/5/17  Date of treatment: 04/26/2017   Time in: 9:10  Time out: 10:15  Total treatment time: 65 minutes  # Visits: 12/20  Auth expiration: 12/31/17  POC expiration: 6/30/17    Outpatient Physical Therapy   Updated POC    Subjective     Pt reports that she's been doing well. She hasn't had any issues since our last session; not sure if it's the Myrbetriq or other factors. Pt states she felt good after receiving the abdominal manual care at her last visit with improvement in bowels. She returns with her bladder diary today reporting that it was helpful to fill it out and will continue to try to determine her food triggers. 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 Despite increased stress, pt has not flared.    Pt reports 0/10 pain today. Pt reports a little urgency but denies pain.    Objective       TREATMENT    Pt was instructed in therapeutic exercise for 20 minutes including:    - Reviewed bladder diary and discussed pt's experience with filling it out; pt was instructed to increase her water intake as a starting point; will follow-up at her next visit and continue to monitor    Not performed:  - 10 quick flicks; pt with normal resting baseline, good WR rise, complete derecruitment  - 5 x 10'' kegals in supine; normal resting baseline, good holding ability, strong 3/5 contraction with good coordination and isolation  - Pt education regarding mechanics and physiology of defecation, optimal body mechanics, and bladder/bowel diary    Pt received the following manual therapy techniques for 40 min: MFR applied to low abdominal region and to diaphragm    Pt received TENS for 0 min following tx: premod, continuous, 6.6 V (suprapubic), 4.5 V (sacral spinal roots)  - NP    Education: Discussed functional status, progress and POC with patient. Pt was instructed to continue with current HEP consisting of diaphragmatic breathing, calm seema, kegals, piriformis stretch, wall squat, supine adductor stretch, adductor squeeze and clams and colon massage. Tara verbalized understanding. Tara demonstrated and verbalized understanding of all instruction given.    CMS Impairment/Limitation/Restriction for Urinary Problem Survey  Status Limitation G-Code CMS Severity Modifier  Intake 72% 28%  Predicted 8% 28% Goal Status+ CJ - At least 20 percent but less than 40 percent  2/21/2017 72% 28%  4/17/2017 60% 40% Current Status CK - At least 40 percent but less than 60 percent    Assessment     Pt tolerated session well without visible adverse effects. Pt continues to display abdominal soft tissue restrictions but responds very well to manual care with improved mobility, bowel sounds, and good relaxation ability during myofascial release. Pt returned today with her bladder diary. She has done well since our last visit with IC symptoms and with BM's; diary does reveal decreased fluid and food intake. Pt was instructed to increase daily amount of water to start and to continue tracking her IC symptoms and food intake in order to continue to determine personal irritants. Pt will continue to benefit from skilled PT intervention to maximize pain free functional independence.    Will reassess PFM at pt's next visit; continue with bladder/bowel diary review and instruct pt in core strengthening (plank/side plank).    Pt is making good progress towards established goals. No spiritual, cultural, or educational needs identified.    Updated GOALS  Short Term Goals: 1 month (2/2/17)  1. Pt will verbalize improved awareness of PFM activity as palpated by PT in order to improve activity involvement with HEP. MET 2/21/17  2. Pt will tolerate HEP to improve impairments and independence with ADL's. MET  2/21/17  3. Pt will demonstrate ability to perform kegal on demand with decreased use of overflow mm and without breath holding. Goal added 1/17/17 MET 2/21/17     Long Term Goals: 3 months (continue to 6/30/17)  1. Pt will be able to tolerate sitting for 30-60 minutes without exacerbation of symptoms in order to improve functional independence and work tolerance.  2. Pt will be able to perform exercise routine developed with PT in order to be able to exercise without exacerbation of symptoms to enhance QOL/for stress relief.  3. Pt will be independent with HEP and self management.  4. Pt will increase PFM strength to 3/5 with ability to hold for 5-10 seconds without exacerbation of symptoms in order to improve functional strength and tolerance to exercise. Goal added 1/17/17 MET 2/21/17  5. Pt will demonstrate 4/5 PFM strength with ability to hold for 10 seconds 10 times in standing in order to improve functional strength and tolerance to exercise. Goal added 4/6/17  6. Pt will complete a bladder/bowel diary in order to improve urinary and bowel function/decrease symptoms to improve functional independence and ability to perform desired activities (i.e. Gardening and exercise). Goal added 4/6/17    Plan     Continue with established POC, working toward PT goals.

## 2017-05-10 ENCOUNTER — CLINICAL SUPPORT (OUTPATIENT)
Dept: REHABILITATION | Facility: HOSPITAL | Age: 52
End: 2017-05-10
Attending: NURSE PRACTITIONER
Payer: COMMERCIAL

## 2017-05-10 DIAGNOSIS — M62.89 PELVIC FLOOR DYSFUNCTION: Primary | ICD-10-CM

## 2017-05-10 PROCEDURE — 97140 MANUAL THERAPY 1/> REGIONS: CPT | Mod: PN

## 2017-05-10 PROCEDURE — 97110 THERAPEUTIC EXERCISES: CPT | Mod: PN

## 2017-05-10 NOTE — PROGRESS NOTES
"Patient: Tara [facility] #: [id number]   Diagnosis:   Encounter Diagnosis   Name Primary?    Pelvic floor dysfunction Yes      Date of start of care: 1/5/17  Date of treatment: 05/10/2017   Time in: 9:15  Time out: 10:35  Total treatment time: 80 minutes  # Visits: 13/20  Auth expiration: 12/31/17  POC expiration: 6/30/17    Outpatient Physical Therapy   Progress Note    Subjective     Pt reports that she started flaring a day after our last session and then it got worse over the weekend, she had to drive to [place] for a soccer tournament which did not help. When she got home she got back to doing her stretches and started feeling better but then it got worse again; pt reports work has continued to be very hard and stressful and her son got a DWI so her stress levels have been much higher; "just a really bad week." She also reports that the flare was worse than it usually is and she was having pain and pressure even when lying down. She states she is doing better today. She reports nocturia x 3-4 last night. Tara reports that she saw a gastrologist at [facility] last week who told her that the gas and bloating can occur if she isn't emptying all the way; she was instructed to take 1 tbs of Citrucel twice per day and states she had a really good BM the next morning after starting and emptied fully so thinks it is going to help.    Pt reports 4/10 pain today.     Objective     TREATMENT    Pt was instructed in therapeutic exercise for 45 minutes including:    - 10 x 10'' kegals in supine; pt displays good recruitment, good WR rise, complete derecruitment, 4/5 strength  - Pt education regarding stress management including use of mindfulness finding enjoyable activity to curb stress levels    Pt received the following manual therapy techniques for 20 min: soft tissue mobilization applied to bilateral levator ani and OI; pt displays good ability to lift and drop with good PFM awareness and good tissue " mobility; pt with some minimal tightness on the L side today, without pain or tenderness    Pt received TENS for 0 min following tx: premod, continuous, 6.6 V (suprapubic), 4.5 V (sacral spinal roots) - NP    Education: Discussed functional status, progress and POC with patient. Pt was instructed to continue with current HEP consisting of diaphragmatic breathing, calm seema, kegals, piriformis stretch, wall squat, supine adductor stretch, adductor squeeze and clams and colon massage. Tara verbalized understanding. Tara demonstrated and verbalized understanding of all instruction given.    CMS Impairment/Limitation/Restriction for Urinary Problem Survey  Status Limitation G-Code CMS Severity Modifier  Intake 72% 28%  Predicted 8% 28% Goal Status+ CJ - At least 20 percent but less than 40 percent  2/21/2017 72% 28%  4/17/2017 60% 40% Current Status CK - At least 40 percent but less than 60 percent    Assessment     Pt tolerated session well without visible adverse effects, good tissue response to manual care noted. Pt came to today's session with report of recent flare up that she states was worse than her typical flares; pt reports significant increase in stress levels, both work and family related; we discussed stress management today and relationship of stress to pt's symptoms potentially being more influential that food/fluid intake as pt is very aware of and careful of her diet. Pt was encouraged to continue to determine the foods that do not cause flares; pt saw GI recently and reports that new colace dosage seems to be helping with constipation. Pt continues to present with abdominal soft tissue restrictions and pelvic pain. Pt will continue to benefit from skilled PT intervention to maximize pain free functional independence. Will instruct pt in appropriate core strengthening exercises next session and address yoga poses she would like to perform without exacerbating her symptoms.    Pt is making good  progress towards established goals. No spiritual, cultural, or educational needs identified.    Updated GOALS  Short Term Goals: 1 month (2/2/17)  1. Pt will verbalize improved awareness of PFM activity as palpated by PT in order to improve activity involvement with HEP. MET 2/21/17  2. Pt will tolerate HEP to improve impairments and independence with ADL's. MET 2/21/17  3. Pt will demonstrate ability to perform kegal on demand with decreased use of overflow mm and without breath holding. Goal added 1/17/17 MET 2/21/17     Long Term Goals: 3 months (continue to 6/30/17)  1. Pt will be able to tolerate sitting for 30-60 minutes without exacerbation of symptoms in order to improve functional independence and work tolerance.  2. Pt will be able to perform exercise routine developed with PT in order to be able to exercise without exacerbation of symptoms to enhance QOL/for stress relief.  3. Pt will be independent with HEP and self management.  4. Pt will increase PFM strength to 3/5 with ability to hold for 5-10 seconds without exacerbation of symptoms in order to improve functional strength and tolerance to exercise. Goal added 1/17/17 MET 2/21/17  5. Pt will demonstrate 4/5 PFM strength with ability to hold for 10 seconds 10 times in standing in order to improve functional strength and tolerance to exercise. Goal added 4/6/17  6. Pt will complete a bladder/bowel diary in order to improve urinary and bowel function/decrease symptoms to improve functional independence and ability to perform desired activities (i.e. Gardening and exercise). Goal added 4/6/17 met 5/10/17    Plan     Continue with established POC, working toward PT goals.

## 2017-05-25 ENCOUNTER — CLINICAL SUPPORT (OUTPATIENT)
Dept: REHABILITATION | Facility: HOSPITAL | Age: 52
End: 2017-05-25
Attending: NURSE PRACTITIONER
Payer: COMMERCIAL

## 2017-05-25 DIAGNOSIS — M62.89 PELVIC FLOOR DYSFUNCTION: Primary | ICD-10-CM

## 2017-05-25 PROCEDURE — 97140 MANUAL THERAPY 1/> REGIONS: CPT | Mod: PN

## 2017-05-25 NOTE — PROGRESS NOTES
Patient: Tara [facility] #: [id number]   Diagnosis:   Encounter Diagnosis   Name Primary?    Pelvic floor dysfunction Yes      Date of start of care: 1/5/17  Date of treatment: 05/25/2017   Time in: 8:07  Time out: 9:07  Total treatment time: 60 minutes  # Visits: 14/20  Auth expiration: 12/31/17  POC expiration: 6/30/17    Outpatient Physical Therapy   Progress Note    Subjective     Pt reports that things are going much better. She has had no symptoms/has been pain free for the past two days. And hasn't had a lot of pain for the past two weeks. She had felt a bit bloaty and gassy but otherwise well. Not doing her pump class as much but she did do it last night, down to about two days a week and yoga two days a week.     Pt reports 0/10 pain today.     Objective     TREATMENT    Pt was instructed in therapeutic exercise for 0 minutes including:    - 10 x 10'' kegals in supine; pt displays good recruitment, good WR rise, complete derecruitment, 4/5 strength  - Pt education regarding stress management including use of mindfulness finding enjoyable activity to curb stress levels    Pt received the following manual therapy techniques for 60 min: soft tissue mobilization and MFR applied to abdomen; colon massage; respiratory diaphragm release; suboccipital release and soft tissue mobilization applied to bilateral upper traps, levator scap, SCM, scalenes    Pt received TENS for 0 min following tx: premod, continuous, 6.6 V (suprapubic), 4.5 V (sacral spinal roots) - NP    Education: Discussed progress today with discussion of improvement and self management moving forward; pt states she feels she has a much better understanding/awareness of her body and of her dx and how to manage it. Pt was instructed to continue with current HEP consisting of diaphragmatic breathing, calm seema, kegals, piriformis stretch, wall squat, supine adductor stretch, adductor squeeze and clams and colon massage. Tara verbalized  understanding. Tara demonstrated and verbalized understanding of all instruction given.    CMS Impairment/Limitation/Restriction for Urinary Problem Survey  Status Limitation G-Code CMS Severity Modifier  Intake 72% 28%  Predicted 8% 28% Goal Status+ CJ - At least 20 percent but less than 40 percent  2/21/2017 72% 28%  4/17/2017 60% 40% Current Status CK - At least 40 percent but less than 60 percent    Assessment     Pt tolerated session well without visible adverse effects, good tissue response to manual care noted. Pt has made good progress overall with improved awareness of her body and understanding of her dx and options for self management. Pt displays good PFM function with ability to relax fully and with good, strong kegal and good endurance. Discussed pt's progress today and her pelvic health and decided to discharge in one month pending continued ability to self manage due to all goals being met as outlined below. Pt was instructed to call to schedule a follow-up if she feels she needs to come in for any reason before 6/30/17 when POC expires. Will otherwise discharge at that time.    Pt is making good progress towards established goals. No spiritual, cultural, or educational needs identified.    Updated GOALS  Short Term Goals: 1 month (2/2/17)  1. Pt will verbalize improved awareness of PFM activity as palpated by PT in order to improve activity involvement with HEP. MET 2/21/17  2. Pt will tolerate HEP to improve impairments and independence with ADL's. MET 2/21/17  3. Pt will demonstrate ability to perform kegal on demand with decreased use of overflow mm and without breath holding. Goal added 1/17/17 MET 2/21/17     Long Term Goals: 3 months (continue to 6/30/17)  1. Pt will be able to tolerate sitting for 30-60 minutes without exacerbation of symptoms in order to improve functional independence and work tolerance. Met 5/25/17  2. Pt will be able to perform exercise routine developed with PT in  order to be able to exercise without exacerbation of symptoms to enhance QOL/for stress relief. Met 5/25/17; pt is able to exercise as desired  3. Pt will be independent with HEP and self management. Met 5/25/17  4. Pt will increase PFM strength to 3/5 with ability to hold for 5-10 seconds without exacerbation of symptoms in order to improve functional strength and tolerance to exercise. Goal added 1/17/17 MET 2/21/17  5. Pt will demonstrate 4/5 PFM strength with ability to hold for 10 seconds 10 times in standing in order to improve functional strength and tolerance to exercise. Goal added 4/6/17 Met 5/25/17 in supine  6. Pt will complete a bladder/bowel diary in order to improve urinary and bowel function/decrease symptoms to improve functional independence and ability to perform desired activities (i.e. Gardening and exercise). Goal added 4/6/17 met 5/10/17    Plan     Discharge in one month on 6/30 if pt does not call.

## 2017-06-19 ENCOUNTER — OFFICE VISIT (OUTPATIENT)
Dept: UROGYNECOLOGY | Facility: CLINIC | Age: 52
End: 2017-06-19
Payer: COMMERCIAL

## 2017-06-19 ENCOUNTER — HOSPITAL ENCOUNTER (OUTPATIENT)
Dept: RADIOLOGY | Facility: HOSPITAL | Age: 52
Discharge: HOME | End: 2017-06-19
Attending: NURSE PRACTITIONER
Payer: COMMERCIAL

## 2017-06-19 VITALS — DIASTOLIC BLOOD PRESSURE: 60 MMHG | HEIGHT: 65 IN | SYSTOLIC BLOOD PRESSURE: 90 MMHG

## 2017-06-19 DIAGNOSIS — N94.9 ADNEXAL FULLNESS: ICD-10-CM

## 2017-06-19 DIAGNOSIS — N30.10 IC (INTERSTITIAL CYSTITIS): Primary | ICD-10-CM

## 2017-06-19 DIAGNOSIS — R10.32 LLQ PAIN: ICD-10-CM

## 2017-06-19 PROCEDURE — 76830 TRANSVAGINAL US NON-OB: CPT | Mod: 26,,, | Performed by: RADIOLOGY

## 2017-06-19 PROCEDURE — 96372 THER/PROPH/DIAG INJ SC/IM: CPT | Mod: S$GLB,,, | Performed by: NURSE PRACTITIONER

## 2017-06-19 PROCEDURE — 99999 PR PBB SHADOW E&M-EST. PATIENT-LVL III: CPT | Mod: PBBFAC,,, | Performed by: NURSE PRACTITIONER

## 2017-06-19 PROCEDURE — 76856 US EXAM PELVIC COMPLETE: CPT | Mod: 26,,, | Performed by: RADIOLOGY

## 2017-06-19 PROCEDURE — 99213 OFFICE O/P EST LOW 20 MIN: CPT | Mod: 25,S$GLB,, | Performed by: NURSE PRACTITIONER

## 2017-06-19 PROCEDURE — 76856 US EXAM PELVIC COMPLETE: CPT | Mod: TC

## 2017-06-19 RX ORDER — BUPIVACAINE HYDROCHLORIDE 5 MG/ML
20 INJECTION, SOLUTION PERINEURAL
Status: COMPLETED | OUTPATIENT
Start: 2017-06-19 | End: 2017-06-19

## 2017-06-19 RX ORDER — MIRABEGRON 50 MG/1
TABLET, FILM COATED, EXTENDED RELEASE ORAL
COMMUNITY
Start: 2017-06-06 | End: 2021-01-29

## 2017-06-19 RX ADMIN — BUPIVACAINE HYDROCHLORIDE 100 MG: 5 INJECTION, SOLUTION PERINEURAL at 12:06

## 2017-06-19 NOTE — PATIENT INSTRUCTIONS
1) Bladder health  --Empty bladder every 3 hours.  Empty well: wait a minute, lean forward on toilet.    --Avoid dietary irritants (see sheet).  Keep diary x 3-5 days to determine your irritants.    --URGE: oxybutynin, trospium cause constipation. Uses uribel intermittently.  Use uribel post coital and any time you are having urgency.  --continue myrbetriq  --valium suppositories    2)  IC  --Recommend Prelief to help alkinize the urine:   --Prelief Tablets : Each tablet contains 345 mg calcium glycerophosphate (65 mg of elemental calcium). The tablets also contain 0.25% magnesium  stearate as a processing aid. Two tablets are equivalent to 690 mg calcium glycerophosphate (130mg of elemental calcium).   --Prelief Powder : Each ¼ teaspoon usage of powder is comparable to two tablets. The powder dissolves rapidly in food or non-alcoholic beverages.  Tablets are recommended for taking with alcoholic beverages   --Usage:     --Tablets: 2-3 tablets, 2 times a day.    --Powder: ¼ teaspoon of powder 2 times a day. More can be used when needed     --Hydrodistention With Cystoscopy: allows physicians to take a much closer look at the bladder wall. While the AUA no longer suggests that it be used as a diagnostic method (unless a diagnosis is in doubt), hydrodistention may provide modest relief in IC symptoms which can last from three to six months.   --continue  pelvic floor PT      3)Vaginal atrophy (dryness):  Did not tolerate premarin  --did not fill osphena    4)Constipation controlled- continue fiber  Constipation:  Controlling constipation may help bladder urgency/leakage and fiber may better control cholesterol and blood glucose.  Start daily fiber.  Take 1 tsp of fiber powder (psyllium or other sugar-free powder).  Mix in 8 oz of water.  Take x 3-5 days.  Then, increase fiber by 1 tsp every 3-5 days until stool is easy to pass.  Stop and continue at that dose.   Do not exceed 6 tsps/day.  May also use over the  counter stool softener 1-2 x/day.  AVOID laxatives.    5)L adnexal pain/ fullness  --pelvic ultrasound ordered    6)RTC 07/2017 for annual

## 2017-06-19 NOTE — PROGRESS NOTES
Urogyn follow up  2017    [facility]  [address] [id number]    Tara Unger  [id number]  1965      Tara Unger is a 52 y.o.  here for a urogyn follow up. Long discussion of her symptoms     14  Title of Operation:  1) Total laparoscopic hysterectomy  2) Bilateral laparoscopic salpingectomy  3) Bilateral laparoscopic uterosacral suspension  4) Cystourethroscopy  5) Posterior repair with perineorrhaphy  6) Intraoperative radiology films for incorrect count: positive for needle in pelvis, removed laparoscopically, repeat film negative and count correct.     Indications for Surgery:  1) UI: (+) UVALDO only if bladder really full and sneezes, not with exercise. UVALDO not daily. (--) pads, no underwear changes. Daytime frequency: Q 2 hours. Nocturia: Yes: 1/night, around 4 AM. (--) dysuria, (--) hematuria, (--) frequent UTIs. Has had 1-2 mild UTIs. (+) complete bladder emptying.  --UF prolonged but normal otherwise. PF incomplete void until catheters removed. Compliance normal. Max capacity >400 mL. DO (--). UVALDO (--).   2) POP: Absent. (--) vaginal bleeding. +nl menses on OCP. (--) vaginal discharge. (+) sexually active. (--) dyspareunia. (+) Vaginal dryness. (--) vaginal estrogen use.   --POP-Q: Aa -1; Ba -1; C -6; Ap 0; Bp 0 (very distal rectocele); D -9. Genital hiatus 3, perineal body 3, total vaginal length 10.  3) BM: (+) constipation/straining. (--) chronic diarrhea. (--) hematochezia. (--) fecal incontinence. (--) fecal smearing/urgency. (+) incomplete evacuation. No splinting.   4) ? IC: Dx last year per Dr. Labadie at [facility]. No cysto. Was counseled to eliminate exacerbating foods--did seem to help. Seems worse after on feet all day or with stress. Sx: frequency, urgency, feels some burning at the end of void, difficult evacuation. +relief with emptying. Still + menses--on OCP due to frequent menses. No correlation with menses. May have some  flare with intercourse. Pain with flare: 6/10 only with urination, usually lasts x several weeks but gradually improves. Flares occur Q 2-3 months. Does have some anxiety. No previous treatment.   5) Patient desires ovarian preservation.     Preoperative Diagnosis:  1) Cystocele  2) Rectocele  3) Dysfunctional uterine bleeding  4) Urinary urgency/frequency  5) Concern for interstitial cystitis  6) Chronic constipation  7) Defecatory dysfunction  8) Mixed urinary incontinence    Postoperative Diagnosis:  1) Cystocele  2) Rectocele  3) Dysfunctional uterine bleeding  4) Urinary urgency/frequency  5) Concern for interstitial cystitis  6) Chronic constipation  7) Defecatory dysfunction  8) Mixed urinary incontinence    Past Medical History:   Diagnosis Date    Urinary tract infection     Uterine fibroid        Past Surgical History:   Procedure Laterality Date    HYSTERECTOMY      PELVIC LAPAROSCOPY      uterine septum repair    RHINOPLASTY TIP  1980    TUBAL LIGATION  December 1999    Postpartum    umbilical cyst  2004       Current Outpatient Prescriptions   Medication Sig    ALOE VERA ORAL Take by mouth.    DOC-Q-LACE 100 mg capsule TAKE 1 CAPSULE BY MOUTH TWICE DAILY    fish oil-omega-3 fatty acids 300-1,000 mg capsule Take 2 g by mouth once daily.    MYRBETRIQ 50 mg Tb24     UNABLE TO FIND medication name:  Cysto Renew    URIBEL 118-10-40.8-36 mg Cap Take 1 capsule by mouth 4 (four) times daily as needed.    hydrOXYzine HCl (ATARAX) 25 MG tablet Take 1 tablet (25 mg total) by mouth once daily.     No current facility-administered medications for this visit.        Last HPI dated 02/02/2017  1)  UI:  (--) UVALDO   (--) UUI   (--) pads:  Daytime frequency: Q 3  hours.  Nocturia: NO:   (--) dysuria,  (--) hematuria,  (--) frequent UTIs.  (--) complete bladder emptying.   Failed myrbetriq, trospium, and oxybutynin.  Using uribel intermittently.    Has occasional urgency/burning.  Most of the irritation is  around the urethra.    2)  POP:  Absent.  introitus.  Symptoms:(--)    (--) vaginal bleeding. (--) vaginal discharge. (+) sexually active.  (--) dyspareunia (+)  Vaginal dryness.  (+) vaginal estrogen use- using estrace.    3)  BM:  (--) constipation/straining.  (--) chronic diarrhea. (--) hematochezia.  (--) fecal incontinence.  (--) fecal smearing/urgency.  (--) incomplete evacuation.  Eats high fiber diet.  Was very constipated on myrbetriq and amitrypyline.      4)?IC-  Did not tolerate amitriptyline ? constipation  --burning is near urethra  --uses uribel prn  --seeing Lauren Shepley for pelvic floor PT  --stopped hydoxizine, myrbetrix, and elavil    Changes since last visit  1)  UI:  (--) UVALDO   (--) UUI   (--) pads:  Daytime frequency: Q 3  hours.  Nocturia: NO:   (--) dysuria,  (--) hematuria,  (--) frequent UTIs.  (--) complete bladder emptying.   Failed myrbetriq, trospium, and oxybutynin.  Using uribel intermittently.    Has occasional urgency/burning.  Most of the irritation is around the urethra.    2)  POP:  Absent.  introitus.  Symptoms:(--)    (--) vaginal bleeding. (--) vaginal discharge. (+) sexually active.  (--) dyspareunia (+)  Vaginal dryness.  (+) vaginal estrogen use- using estrace.    3)  BM:  (--) constipation/straining.  (--) chronic diarrhea. (--) hematochezia.  (--) fecal incontinence.  (--) fecal smearing/urgency.  (--) incomplete evacuation.  Eats high fiber diet.  Was very constipated on myrbetriq and amitrypyline.      4)?IC-  Did not tolerate amitriptyline ? constipation  --burning/ pain is near urethra-- worse over the past 2 weeks  --uses uribel prn  --seeing Lauren Shepley for pelvic floor PT-- on hold for now  --stopped hydoxizine and elavil  --restarted myrbetriq 3 months ago    ROS:  As per HPI.      Well Woman:  Pap:02/2013 normal- patient is post hyst- no further screening needed  Mammo:08/03/2016- normal  Colonoscopy:07/29/2016 normal- repeat in 10 years  Dexa:2014 normal  "per patient report      Exam  BP 90/60   Ht 5' 5" (1.651 m)   LMP 11/30/2014   General: alert and oriented, no acute distress  Respiratory: normal respiratory effort  Abd: soft, non-tender, non-distended      Pelvic  Ext. Genitalia: normal external genitalia. Normal bartholin's and skenes glands  Vagina: min atrophy. Normal vaginal mucosa without lesions. No discharge noted.   Non-tender bladder base without palpable mass.   Cervix: absent  Uterus:  surgically absent, vaginal cuff well healed   Urethra: no masses or tenderness  Urethral meatus: no lesions, caruncle or prolapse.  Adnexa:  Fullness noted in L adnexa-- no definite mass palpable-- tender to palpation.       Impression  1. Urinary urgency  2. Vaginal atrophy  3. History of Constipation  4. IC    We reviewed the above issues and discussed options for short-term versus long-term management of her problems.   Plan:     1) Bladder health  --Empty bladder every 3 hours.  Empty well: wait a minute, lean forward on toilet.    --Avoid dietary irritants (see sheet).  Keep diary x 3-5 days to determine your irritants.    --URGE: oxybutynin, trospium cause constipation. Uses uribel intermittently.  Use uribel post coital and any time you are having urgency.  --continue myrbetriq  --valium suppositories    2)  IC  --Recommend Prelief to help alkinize the urine:   --Prelief Tablets : Each tablet contains 345 mg calcium glycerophosphate (65 mg of elemental calcium). The tablets also contain 0.25% magnesium  stearate as a processing aid. Two tablets are equivalent to 690 mg calcium glycerophosphate (130mg of elemental calcium).   --Prelief Powder : Each ¼ teaspoon usage of powder is comparable to two tablets. The powder dissolves rapidly in food or non-alcoholic beverages.  Tablets are recommended for taking with alcoholic beverages   --Usage:     --Tablets: 2-3 tablets, 2 times a day.    --Powder: ¼ teaspoon of powder 2 times a day. More can be used when needed   "   --Hydrodistention With Cystoscopy: allows physicians to take a much closer look at the bladder wall. While the AUA no longer suggests that it be used as a diagnostic method (unless a diagnosis is in doubt), hydrodistention may provide modest relief in IC symptoms which can last from three to six months.   --continue  pelvic floor PT      3)Vaginal atrophy (dryness):  Did not tolerate premarin  --did not fill osphena    4)Constipation controlled- continue fiber  Constipation:  Controlling constipation may help bladder urgency/leakage and fiber may better control cholesterol and blood glucose.  Start daily fiber.  Take 1 tsp of fiber powder (psyllium or other sugar-free powder).  Mix in 8 oz of water.  Take x 3-5 days.  Then, increase fiber by 1 tsp every 3-5 days until stool is easy to pass.  Stop and continue at that dose.   Do not exceed 6 tsps/day.  May also use over the counter stool softener 1-2 x/day.  AVOID laxatives.    5)L adnexal pain/ fullness  --pelvic ultrasound ordered    6)RTC 07/2017 for annual    30 minutes were spent in face to face time with this patient  90 % of this time was spent in counseling and/or coordination of care    GILDARDO Mccarthy-BC [facility]  Division of Female Pelvic Medicine and Reconstructive Surgery  Department of Obstetrics & Gynecology

## 2017-06-20 ENCOUNTER — TELEPHONE (OUTPATIENT)
Dept: UROGYNECOLOGY | Facility: CLINIC | Age: 52
End: 2017-06-20

## 2017-06-20 ENCOUNTER — PATIENT MESSAGE (OUTPATIENT)
Dept: UROGYNECOLOGY | Facility: CLINIC | Age: 52
End: 2017-06-20

## 2017-06-20 NOTE — TELEPHONE ENCOUNTER
----- Message from Thom Castelan sent at 6/20/2017  3:50 PM CDT -----  Contact: Pt  X_ 1st Request  _ 2nd Request  _ 3rd Request    Who:LOTTIE ESCUDERO [[id number]]    Why: Patient would like to speak with staff in regards to questions she has about her ultrasound    What Number to Call Back: [phone]    When to Expect a call back: (Before the end of the day)  -- if call after 3:00 call back will be tomorrow.

## 2017-06-23 RX ORDER — DOCUSATE SODIUM 100 MG/1
CAPSULE, LIQUID FILLED ORAL
Qty: 30 CAPSULE | Refills: 0 | Status: SHIPPED | OUTPATIENT
Start: 2017-06-23 | End: 2018-08-26

## 2017-08-18 ENCOUNTER — DOCUMENTATION ONLY (OUTPATIENT)
Dept: REHABILITATION | Facility: HOSPITAL | Age: 52
End: 2017-08-18

## 2017-08-18 NOTE — PROGRESS NOTES
Name: Tara [facility] Number: [id number]   Age: 52 y.o.   Diagnosis: Incontinence, urinary urgency  Physician: Bobo  Original Orders : PT eval and treat  Initial visit: 1/5/2017  Date of Last visit: 5/25/17  Date of Discharge Note: 8/18/17  Total Visits Received: 14    Pt last seen on 5/25 reporting overall improvement. Pt with improved pelvic awareness and options for self management following PT care. Pt also with good PFM function and no pelvic floor dysfunction resulting from her IC. Pt met goals as outlined below and no longer requires skilled PT intervention at this time.    Short Term Goals: 1 month (2/2/17)  1. Pt will verbalize improved awareness of PFM activity as palpated by PT in order to improve activity involvement with HEP. MET 2/21/17  2. Pt will tolerate HEP to improve impairments and independence with ADL's. MET 2/21/17  3. Pt will demonstrate ability to perform kegal on demand with decreased use of overflow mm and without breath holding. Goal added 1/17/17 MET 2/21/17     Long Term Goals: 3 months (continue to 6/30/17)  1. Pt will be able to tolerate sitting for 30-60 minutes without exacerbation of symptoms in order to improve functional independence and work tolerance. Met 5/25/17  2. Pt will be able to perform exercise routine developed with PT in order to be able to exercise without exacerbation of symptoms to enhance QOL/for stress relief. Met 5/25/17; pt is able to exercise as desired  3. Pt will be independent with HEP and self management. Met 5/25/17  4. Pt will increase PFM strength to 3/5 with ability to hold for 5-10 seconds without exacerbation of symptoms in order to improve functional strength and tolerance to exercise. Goal added 1/17/17 MET 2/21/17  5. Pt will demonstrate 4/5 PFM strength with ability to hold for 10 seconds 10 times in standing in order to improve functional strength and tolerance to exercise. Goal added 4/6/17 Met 5/25/17 in supine  6. Pt will  complete a bladder/bowel diary in order to improve urinary and bowel function/decrease symptoms to improve functional independence and ability to perform desired activities (i.e. Gardening and exercise). Goal added 4/6/17 met 5/10/17      Plan:  This patient is discharged from Physical Therapy Services.

## 2017-09-06 DIAGNOSIS — N30.10 IC (INTERSTITIAL CYSTITIS): ICD-10-CM

## 2017-09-06 DIAGNOSIS — R39.15 URINARY URGENCY: ICD-10-CM

## 2017-09-06 RX ORDER — MIRABEGRON 50 MG/1
TABLET, FILM COATED, EXTENDED RELEASE ORAL
Qty: 30 TABLET | Refills: 11 | Status: SHIPPED | OUTPATIENT
Start: 2017-09-06 | End: 2018-08-26

## 2017-09-08 ENCOUNTER — TELEPHONE (OUTPATIENT)
Dept: UROGYNECOLOGY | Facility: CLINIC | Age: 52
End: 2017-09-08

## 2017-09-08 DIAGNOSIS — Z12.31 ENCOUNTER FOR SCREENING MAMMOGRAM FOR BREAST CANCER: Primary | ICD-10-CM

## 2017-09-08 NOTE — TELEPHONE ENCOUNTER
----- Message from Adelaide Morales sent at 9/8/2017  2:13 PM CDT -----  Contact: self  Patient is requesting mammogram orders, patient can be reached at [phone]

## 2017-09-13 ENCOUNTER — OFFICE VISIT (OUTPATIENT)
Dept: UROGYNECOLOGY | Facility: CLINIC | Age: 52
End: 2017-09-13
Payer: COMMERCIAL

## 2017-09-13 VITALS — WEIGHT: 107.56 LBS | BODY MASS INDEX: 17.92 KG/M2 | HEIGHT: 65 IN

## 2017-09-13 DIAGNOSIS — N95.2 VAGINAL ATROPHY: ICD-10-CM

## 2017-09-13 DIAGNOSIS — R39.15 URINARY URGENCY: ICD-10-CM

## 2017-09-13 DIAGNOSIS — R35.0 URINARY FREQUENCY: ICD-10-CM

## 2017-09-13 DIAGNOSIS — N30.10 INTERSTITIAL CYSTITIS: Primary | ICD-10-CM

## 2017-09-13 PROCEDURE — 99213 OFFICE O/P EST LOW 20 MIN: CPT | Mod: S$GLB,,, | Performed by: NURSE PRACTITIONER

## 2017-09-13 PROCEDURE — 99999 PR PBB SHADOW E&M-EST. PATIENT-LVL III: CPT | Mod: PBBFAC,,, | Performed by: NURSE PRACTITIONER

## 2017-09-13 PROCEDURE — 3008F BODY MASS INDEX DOCD: CPT | Mod: S$GLB,,, | Performed by: NURSE PRACTITIONER

## 2017-09-13 NOTE — PROGRESS NOTES
Urogyn follow up  2017    [facility]  [address] [id number]    Tara Unger  [id number]  1965      Tara Unger is a 52 y.o.  here for a urogyn follow up. Long discussion of her symptoms     14  Title of Operation:  1) Total laparoscopic hysterectomy  2) Bilateral laparoscopic salpingectomy  3) Bilateral laparoscopic uterosacral suspension  4) Cystourethroscopy  5) Posterior repair with perineorrhaphy  6) Intraoperative radiology films for incorrect count: positive for needle in pelvis, removed laparoscopically, repeat film negative and count correct.     Indications for Surgery:  1) UI: (+) UVALDO only if bladder really full and sneezes, not with exercise. UVALDO not daily. (--) pads, no underwear changes. Daytime frequency: Q 2 hours. Nocturia: Yes: 1/night, around 4 AM. (--) dysuria, (--) hematuria, (--) frequent UTIs. Has had 1-2 mild UTIs. (+) complete bladder emptying.  --UF prolonged but normal otherwise. PF incomplete void until catheters removed. Compliance normal. Max capacity >400 mL. DO (--). UVALDO (--).   2) POP: Absent. (--) vaginal bleeding. +nl menses on OCP. (--) vaginal discharge. (+) sexually active. (--) dyspareunia. (+) Vaginal dryness. (--) vaginal estrogen use.   --POP-Q: Aa -1; Ba -1; C -6; Ap 0; Bp 0 (very distal rectocele); D -9. Genital hiatus 3, perineal body 3, total vaginal length 10.  3) BM: (+) constipation/straining. (--) chronic diarrhea. (--) hematochezia. (--) fecal incontinence. (--) fecal smearing/urgency. (+) incomplete evacuation. No splinting.   4) ? IC: Dx last year per Dr. Labadie at [facility]. No cysto. Was counseled to eliminate exacerbating foods--did seem to help. Seems worse after on feet all day or with stress. Sx: frequency, urgency, feels some burning at the end of void, difficult evacuation. +relief with emptying. Still + menses--on OCP due to frequent menses. No correlation with menses. May have some flare  with intercourse. Pain with flare: 6/10 only with urination, usually lasts x several weeks but gradually improves. Flares occur Q 2-3 months. Does have some anxiety. No previous treatment.   5) Patient desires ovarian preservation.     Preoperative Diagnosis:  1) Cystocele  2) Rectocele  3) Dysfunctional uterine bleeding  4) Urinary urgency/frequency  5) Concern for interstitial cystitis  6) Chronic constipation  7) Defecatory dysfunction  8) Mixed urinary incontinence    Postoperative Diagnosis:  1) Cystocele  2) Rectocele  3) Dysfunctional uterine bleeding  4) Urinary urgency/frequency  5) Concern for interstitial cystitis  6) Chronic constipation  7) Defecatory dysfunction  8) Mixed urinary incontinence    Past Medical History:   Diagnosis Date    Urinary tract infection     Uterine fibroid        Past Surgical History:   Procedure Laterality Date    HYSTERECTOMY      PELVIC LAPAROSCOPY      uterine septum repair    RHINOPLASTY TIP  1980    TUBAL LIGATION  December 1999    Postpartum    umbilical cyst  2004       Current Outpatient Prescriptions   Medication Sig    ALOE VERA ORAL Take by mouth.     mg capsule TAKE 1 CAPSULE BY MOUTH TWICE DAILY    fish oil-omega-3 fatty acids 300-1,000 mg capsule Take 2 g by mouth once daily.    hydrOXYzine HCl (ATARAX) 25 MG tablet Take 1 tablet (25 mg total) by mouth once daily.    LIDOCAINE 2 %, VALIUM 5 MG, BACLOFEN 4 % SUPPOSITORY INSERT 1 TO 2 SUPPOSITORY VAGINALLY EVERY DAY AS NEEDED    MYRBETRIQ 50 mg Tb24     MYRBETRIQ 50 mg Tb24 TAKE 1 TABLET(50 MG) BY MOUTH EVERY DAY    UNABLE TO FIND medication name:  Cysto Renew    URIBEL 118-10-40.8-36 mg Cap Take 1 capsule by mouth 4 (four) times daily as needed.     No current facility-administered medications for this visit.        Last HPI dated 06/19/2017  1)  UI:  (--) UVALDO   (--) UUI   (--) pads:  Daytime frequency: Q 3  hours.  Nocturia: NO:   (--) dysuria,  (--) hematuria,  (--) frequent UTIs.  (--)  complete bladder emptying.   Failed myrbetriq, trospium, and oxybutynin.  Using uribel intermittently.    Has occasional urgency/burning.  Most of the irritation is around the urethra.  2)  POP:  Absent.  introitus.  Symptoms:(--)    (--) vaginal bleeding. (--) vaginal discharge. (+) sexually active.  (--) dyspareunia (+)  Vaginal dryness.  (+) vaginal estrogen use- using estrace.  3)  BM:  (--) constipation/straining.  (--) chronic diarrhea. (--) hematochezia.  (--) fecal incontinence.  (--) fecal smearing/urgency.  (--) incomplete evacuation.  Eats high fiber diet.  Was very constipated on myrbetriq and amitrypyline.   4)?IC-  Did not tolerate amitriptyline ? constipation  --burning/ pain is near urethra-- worse over the past 2 weeks  --uses uribel prn  --seeing Lauren Shepley for pelvic floor PT-- on hold for now  --stopped hydoxizine and elavil  --restarted myrbetriq 3 months ago      Changes since last visit  1)  UI:  (--) UVALDO   (--) UUI   (--) pads:  Daytime frequency: Q 3  hours.  Nocturia: NO:   (--) dysuria,  (--) hematuria,  (--) frequent UTIs.  (--) complete bladder emptying.   Failed  trospium and oxybutynin.  Has occasional urinary urgency-- taking uribel 1-2 times daily.    Using myrbetriq daily.    2)  POP:  Absent.  introitus.  Symptoms:(--)    (--) vaginal bleeding. (--) vaginal discharge. (+) sexually active.  (--) dyspareunia (+)  Vaginal dryness.  (+) vaginal estrogen use- using estrace.    3)  BM:  (--) constipation/straining.  (--) chronic diarrhea. (--) hematochezia.  (--) fecal incontinence.  (--) fecal smearing/urgency.  (--) incomplete evacuation.  Eats high fiber diet.        4)?IC-  Did not tolerate amitriptyline ? constipation  --burning/ pain is near urethra--   --uses uribel prn  --completed pelvic floor PT  --taking myrbetric daily  --seeing accupuncturist.    --in IC trial with Dr. Finch.    ROS:  As per HPI.      Well Woman:  Pap:02/2013 normal- patient is post hyst- no further  "screening needed  Mammo:08/03/2016- normal  Colonoscopy:07/29/2016 normal- repeat in 10 years  Dexa:2014 normal per patient report      Exam  Ht 5' 5" (1.651 m)   Wt 48.8 kg (107 lb 9.4 oz)   LMP 11/30/2014   BMI 17.90 kg/m²   General: alert and oriented, no acute distress  Respiratory: normal respiratory effort  Abd: soft, non-tender, non-distended      BREAST EXAM: breasts appear normal, no suspicious masses, no skin or nipple changes or axillary nodes      Pelvic  Ext. Genitalia: normal external genitalia. Normal bartholin's and skenes glands  Vagina: min atrophy. Normal vaginal mucosa without lesions. No discharge noted.   Non-tender bladder base without palpable mass.   Cervix: absent  Uterus:  surgically absent, vaginal cuff well healed   Urethra: no masses or tenderness  Urethral meatus: no lesions, caruncle or prolapse.  Adnexa:  No masses palpable     1. Interstitial cystitis     2. Urinary urgency     3. Urinary frequency     4. Vaginal atrophy           We reviewed the above issues and discussed options for short-term versus long-term management of her problems.   Plan:     1) Bladder health  --Empty bladder every 3 hours.  Empty well: wait a minute, lean forward on toilet.    --Avoid dietary irritants (see sheet).  Keep diary x 3-5 days to determine your irritants.    --URGE: oxybutynin, trospium cause constipation. Uses uribel intermittently.  Use uribel post coital and any time you are having urgency.  --continue myrbetriq  --valium suppositories    2)  IC  --Recommend Prelief to help alkinize the urine:   --Prelief Tablets : Each tablet contains 345 mg calcium glycerophosphate (65 mg of elemental calcium). The tablets also contain 0.25% magnesium  stearate as a processing aid. Two tablets are equivalent to 690 mg calcium glycerophosphate (130mg of elemental calcium).   --Prelief Powder : Each ¼ teaspoon usage of powder is comparable to two tablets. The powder dissolves rapidly in food or " non-alcoholic beverages.  Tablets are recommended for taking with alcoholic beverages   --Usage:     --Tablets: 2-3 tablets, 2 times a day.    --Powder: ¼ teaspoon of powder 2 times a day. More can be used when needed     --Hydrodistention With Cystoscopy: allows physicians to take a much closer look at the bladder wall. While the AUA no longer suggests that it be used as a diagnostic method (unless a diagnosis is in doubt), hydrodistention may provide modest relief in IC symptoms which can last from three to six months.   --continue  pelvic floor PT exercises      3)Vaginal atrophy (dryness):   --premarin dime size amount around vaginal opening  --did not fill osphena    4)Constipation controlled- continue fiber  Constipation:  Controlling constipation may help bladder urgency/leakage and fiber may better control cholesterol and blood glucose.  Start daily fiber.  Take 1 tsp of fiber powder (psyllium or other sugar-free powder).  Mix in 8 oz of water.  Take x 3-5 days.  Then, increase fiber by 1 tsp every 3-5 days until stool is easy to pass.  Stop and continue at that dose.   Do not exceed 6 tsps/day.  May also use over the counter stool softener 1-2 x/day.  AVOID laxatives.    7)RTC 02/2018 for f/u IC    30 minutes were spent in face to face time with this patient  90 % of this time was spent in counseling and/or coordination of care    GILDARDO Mccarthy-BC [facility]  Division of Female Pelvic Medicine and Reconstructive Surgery  Department of Obstetrics & Gynecology

## 2017-09-13 NOTE — PATIENT INSTRUCTIONS
1) Bladder health  --Empty bladder every 3 hours.  Empty well: wait a minute, lean forward on toilet.    --Avoid dietary irritants (see sheet).  Keep diary x 3-5 days to determine your irritants.    --URGE: oxybutynin, trospium cause constipation. Uses uribel intermittently.  Use uribel post coital and any time you are having urgency.  --continue myrbetriq  --valium suppositories    2)  IC  --Recommend Prelief to help alkinize the urine:   --Prelief Tablets : Each tablet contains 345 mg calcium glycerophosphate (65 mg of elemental calcium). The tablets also contain 0.25% magnesium  stearate as a processing aid. Two tablets are equivalent to 690 mg calcium glycerophosphate (130mg of elemental calcium).   --Prelief Powder : Each ¼ teaspoon usage of powder is comparable to two tablets. The powder dissolves rapidly in food or non-alcoholic beverages.  Tablets are recommended for taking with alcoholic beverages   --Usage:     --Tablets: 2-3 tablets, 2 times a day.    --Powder: ¼ teaspoon of powder 2 times a day. More can be used when needed     --Hydrodistention With Cystoscopy: allows physicians to take a much closer look at the bladder wall. While the AUA no longer suggests that it be used as a diagnostic method (unless a diagnosis is in doubt), hydrodistention may provide modest relief in IC symptoms which can last from three to six months.   --continue  pelvic floor PT exercises      3)Vaginal atrophy (dryness):   --premarin dime size amount around vaginal opening  --did not fill osphena    4)Constipation controlled- continue fiber  Constipation:  Controlling constipation may help bladder urgency/leakage and fiber may better control cholesterol and blood glucose.  Start daily fiber.  Take 1 tsp of fiber powder (psyllium or other sugar-free powder).  Mix in 8 oz of water.  Take x 3-5 days.  Then, increase fiber by 1 tsp every 3-5 days until stool is easy to pass.  Stop and continue at that dose.   Do not exceed 6  tsps/day.  May also use over the counter stool softener 1-2 x/day.  AVOID laxatives.    7)RTC 02/2018 for f/u IC

## 2017-09-20 ENCOUNTER — HOSPITAL ENCOUNTER (OUTPATIENT)
Dept: RADIOLOGY | Facility: HOSPITAL | Age: 52
Discharge: HOME | End: 2017-09-20
Attending: NURSE PRACTITIONER
Payer: COMMERCIAL

## 2017-09-20 ENCOUNTER — PATIENT MESSAGE (OUTPATIENT)
Dept: UROGYNECOLOGY | Facility: CLINIC | Age: 52
End: 2017-09-20

## 2017-09-20 DIAGNOSIS — Z12.31 ENCOUNTER FOR SCREENING MAMMOGRAM FOR BREAST CANCER: ICD-10-CM

## 2017-09-20 PROCEDURE — 77067 SCR MAMMO BI INCL CAD: CPT | Mod: TC

## 2017-09-20 PROCEDURE — 77063 BREAST TOMOSYNTHESIS BI: CPT | Mod: 26,,, | Performed by: RADIOLOGY

## 2017-09-20 PROCEDURE — 77067 SCR MAMMO BI INCL CAD: CPT | Mod: 26,,, | Performed by: RADIOLOGY

## 2018-08-26 ENCOUNTER — HOSPITAL ENCOUNTER (EMERGENCY)
Facility: HOSPITAL | Age: 53
Discharge: HOME | End: 2018-08-27
Attending: INTERNAL MEDICINE
Payer: COMMERCIAL

## 2018-08-26 DIAGNOSIS — S61.213A LACERATION OF LEFT MIDDLE FINGER WITHOUT FOREIGN BODY WITHOUT DAMAGE TO NAIL, INITIAL ENCOUNTER: Primary | ICD-10-CM

## 2018-08-26 PROCEDURE — 99283 EMERGENCY DEPT VISIT LOW MDM: CPT | Mod: 25

## 2018-08-27 VITALS
TEMPERATURE: 98 F | WEIGHT: 108 LBS | RESPIRATION RATE: 16 BRPM | DIASTOLIC BLOOD PRESSURE: 77 MMHG | HEART RATE: 60 BPM | BODY MASS INDEX: 17.99 KG/M2 | SYSTOLIC BLOOD PRESSURE: 125 MMHG | OXYGEN SATURATION: 100 % | HEIGHT: 65 IN

## 2018-08-27 PROBLEM — S61.213A LACERATION OF LEFT MIDDLE FINGER WITHOUT FOREIGN BODY WITHOUT DAMAGE TO NAIL: Status: ACTIVE | Noted: 2018-08-27

## 2018-08-27 PROCEDURE — 25000003 PHARM REV CODE 250: Performed by: INTERNAL MEDICINE

## 2018-08-27 PROCEDURE — 63600175 PHARM REV CODE 636 W HCPCS: Performed by: INTERNAL MEDICINE

## 2018-08-27 PROCEDURE — 90471 IMMUNIZATION ADMIN: CPT | Performed by: INTERNAL MEDICINE

## 2018-08-27 PROCEDURE — 90715 TDAP VACCINE 7 YRS/> IM: CPT | Performed by: INTERNAL MEDICINE

## 2018-08-27 PROCEDURE — 12001 RPR S/N/AX/GEN/TRNK 2.5CM/<: CPT

## 2018-08-27 RX ORDER — LIDOCAINE HYDROCHLORIDE 10 MG/ML
5 INJECTION INFILTRATION; PERINEURAL
Status: DISCONTINUED | OUTPATIENT
Start: 2018-08-27 | End: 2018-08-27

## 2018-08-27 RX ORDER — IBUPROFEN 600 MG/1
600 TABLET ORAL 3 TIMES DAILY
Qty: 30 TABLET | Refills: 0 | Status: SHIPPED | OUTPATIENT
Start: 2018-08-27 | End: 2021-05-28

## 2018-08-27 RX ORDER — LIDOCAINE HYDROCHLORIDE 10 MG/ML
5 INJECTION INFILTRATION; PERINEURAL
Status: COMPLETED | OUTPATIENT
Start: 2018-08-27 | End: 2018-08-27

## 2018-08-27 RX ORDER — CEPHALEXIN 500 MG/1
500 CAPSULE ORAL EVERY 12 HOURS
Qty: 20 CAPSULE | Refills: 0 | Status: SHIPPED | OUTPATIENT
Start: 2018-08-27 | End: 2018-09-06

## 2018-08-27 RX ORDER — CEPHALEXIN 500 MG/1
500 CAPSULE ORAL
Status: COMPLETED | OUTPATIENT
Start: 2018-08-27 | End: 2018-08-27

## 2018-08-27 RX ADMIN — LIDOCAINE HYDROCHLORIDE 5 ML: 10 INJECTION, SOLUTION INFILTRATION; PERINEURAL at 12:08

## 2018-08-27 RX ADMIN — BACITRACIN ZINC NEOMYCIN SULFATE POLYMYXIN B SULFATE: 400; 3.5; 5 OINTMENT TOPICAL at 01:08

## 2018-08-27 RX ADMIN — CEPHALEXIN 500 MG: 500 CAPSULE ORAL at 12:08

## 2018-08-27 RX ADMIN — CLOSTRIDIUM TETANI TOXOID ANTIGEN (FORMALDEHYDE INACTIVATED), CORYNEBACTERIUM DIPHTHERIAE TOXOID ANTIGEN (FORMALDEHYDE INACTIVATED), BORDETELLA PERTUSSIS TOXOID ANTIGEN (GLUTARALDEHYDE INACTIVATED), BORDETELLA PERTUSSIS FILAMENTOUS HEMAGGLUTININ ANTIGEN (FORMALDEHYDE INACTIVATED), BORDETELLA PERTUSSIS PERTACTIN ANTIGEN, AND BORDETELLA PERTUSSIS FIMBRIAE 2/3 ANTIGEN 0.5 ML: 5; 2; 2.5; 5; 3; 5 INJECTION, SUSPENSION INTRAMUSCULAR at 12:08

## 2018-08-27 NOTE — ED PROVIDER NOTES
Encounter Date: 8/26/2018    SCRIBE #1 NOTE: I, Dhara Quiñones, am scribing for, and in the presence of,  Dr. De La Garza. I have scribed the following portions of the note - Other sections scribed: HPI, ROS, PE.       History     Chief Complaint   Patient presents with    Laceration     pt presents to ER with c/o a laceration to left long finger from a serrated knife.       This is a 53 year old female who presents to the ED complaining of a laceration to the left middle finger that occurred just PTA. She reports using a serrated knife to cut fruit. She reports active bleeding. She denies numbness. She reports she is right handed.       The history is provided by the patient.   Laceration    The incident occurred just prior to arrival. The laceration is located on the left hand. The laceration is 1 cm in size. The laceration mechanism was a a clean knife. The pain is at a severity of 3/10. The pain has been constant since onset. She reports no foreign bodies present. Her tetanus status is unknown.     Review of patient's allergies indicates:  No Known Allergies  Past Medical History:   Diagnosis Date    Urinary tract infection     Uterine fibroid      Past Surgical History:   Procedure Laterality Date    HYSTERECTOMY      2014    PELVIC LAPAROSCOPY      uterine septum repair    RHINOPLASTY TIP  1980    TUBAL LIGATION  December 1999    Postpartum    umbilical cyst  2004     Family History   Problem Relation Age of Onset    Breast cancer Neg Hx     Ovarian cancer Neg Hx     Colon cancer Neg Hx     Diabetes Neg Hx     Hypertension Neg Hx     Stroke Neg Hx     Cervical cancer Neg Hx     Endometrial cancer Neg Hx     Vaginal cancer Neg Hx      Social History     Tobacco Use    Smoking status: Never Smoker    Smokeless tobacco: Never Used   Substance Use Topics    Alcohol use: No     Comment: rare    Drug use: No     Review of Systems   Constitutional: Negative for fever.   Skin: Positive for wound  (laceration).   Neurological: Negative for weakness and numbness.   All other systems reviewed and are negative.      Physical Exam     Initial Vitals [08/26/18 2141]   BP Pulse Resp Temp SpO2   129/83 68 18 97.6 °F (36.4 °C) 100 %      MAP       --         Physical Exam    Nursing note and vitals reviewed.  Constitutional: She appears well-developed and well-nourished.   HENT:   Head: Normocephalic and atraumatic.   Right Ear: External ear normal.   Left Ear: External ear normal.   Nose: Nose normal.   Eyes: Conjunctivae are normal.   Neck: Normal range of motion. Neck supple.   Cardiovascular: Normal rate, regular rhythm, normal heart sounds and intact distal pulses. Exam reveals no gallop and no friction rub.    No murmur heard.  Pulmonary/Chest: Breath sounds normal. No respiratory distress. She has no wheezes. She has no rhonchi. She has no rales.   Musculoskeletal: Normal range of motion.        Left hand: She exhibits laceration (1 cm curved laceration to distal middle left finger). She exhibits normal capillary refill and no deformity. Normal sensation noted.        Hands:  Neurological: No sensory deficit.   Skin: Skin is warm and dry. Capillary refill takes less than 2 seconds. Laceration (1 cm laceration to distal middle left finger) noted.         ED Course   Lac Repair  Date/Time: 8/27/2018 12:33 AM  Performed by: Gen De La Garza MD  Authorized by: Gen De La Garza MD   Consent Done: Yes  Consent: Verbal consent obtained.  Risks and benefits: risks, benefits and alternatives were discussed  Consent given by: patient  Patient understanding: patient states understanding of the procedure being performed  Patient consent: the patient's understanding of the procedure matches consent given  Procedure consent: procedure consent matches procedure scheduled  Patient identity confirmed: verbally with patient  Body area: upper extremity  Location details: left long finger  Laceration length: 1 cm  Foreign  bodies: no foreign bodies  Tendon involvement: none  Nerve involvement: none  Vascular damage: no  Anesthesia: local infiltration    Anesthesia:  Local Anesthetic: lidocaine 1% with epinephrine  Anesthetic total: 3 mL  Patient sedated: no  Preparation: Patient was prepped and draped in the usual sterile fashion.  Debridement: none  Degree of undermining: none  Skin closure: Ethilon (4-0 Ethilon)  Number of sutures: 4  Technique: simple  Approximation: close  Approximation difficulty: simple  Dressing: gauze.  Patient tolerance: Patient tolerated the procedure well with no immediate complications        Labs Reviewed - No data to display       Imaging Results    None          Medical Decision Making:   Initial Assessment:   This is a 53 year old female who presents to the ED complaining of a laceration to the left middle finger that occurred just PTA. She reports using a serrated knife to cut fruit. She reports active bleeding. She denies numbness.               Scribe Attestation:   Scribe #1: I performed the above scribed service and the documentation accurately describes the services I performed. I attest to the accuracy of the note.    This document was produced by a scribe under my direction and in my presence. I agree with the content of the note and have made any necessary edits.     Dr. De La Garza    09/16/2018 4:24 AM             Clinical Impression:     1. Laceration of left middle finger without foreign body without damage to nail, initial encounter            Disposition:   Disposition: Discharged  Condition: Stable                        Gen De La Garza MD  09/16/18 0424

## 2018-08-27 NOTE — ED NOTES
Pt presents with c/o lac to L, middle finger; reports injury occurred today; pressure dsg in place; 2+ radial pulses noted; cap refill less than 3 sec; skin tone normal for ethnicity; reports Tetanus not up today; no resp distress; resp E/U; pt on RA; NAND; will continue to monitor

## 2018-10-17 ENCOUNTER — TELEPHONE (OUTPATIENT)
Dept: UROGYNECOLOGY | Facility: CLINIC | Age: 53
End: 2018-10-17

## 2018-10-17 DIAGNOSIS — Z12.39 BREAST CANCER SCREENING: Primary | ICD-10-CM

## 2018-10-17 NOTE — TELEPHONE ENCOUNTER
----- Message from Junaid Valerio sent at 10/17/2018  1:58 PM CDT -----  Please put orders in for a mammo so I can schedule thanks

## 2018-10-19 ENCOUNTER — OFFICE VISIT (OUTPATIENT)
Dept: UROGYNECOLOGY | Facility: CLINIC | Age: 53
End: 2018-10-19
Payer: COMMERCIAL

## 2018-10-19 VITALS
DIASTOLIC BLOOD PRESSURE: 60 MMHG | BODY MASS INDEX: 18.62 KG/M2 | SYSTOLIC BLOOD PRESSURE: 100 MMHG | HEIGHT: 65 IN | WEIGHT: 111.75 LBS

## 2018-10-19 DIAGNOSIS — Z01.419 WELL WOMAN EXAM: Primary | ICD-10-CM

## 2018-10-19 DIAGNOSIS — N30.10 INTERSTITIAL CYSTITIS: ICD-10-CM

## 2018-10-19 DIAGNOSIS — N95.2 VAGINAL ATROPHY: ICD-10-CM

## 2018-10-19 DIAGNOSIS — Z12.31 ENCOUNTER FOR SCREENING MAMMOGRAM FOR BREAST CANCER: ICD-10-CM

## 2018-10-19 PROCEDURE — 99396 PREV VISIT EST AGE 40-64: CPT | Mod: S$GLB,,, | Performed by: NURSE PRACTITIONER

## 2018-10-19 PROCEDURE — 99999 PR PBB SHADOW E&M-EST. PATIENT-LVL III: CPT | Mod: PBBFAC,,, | Performed by: NURSE PRACTITIONER

## 2018-10-19 NOTE — PROGRESS NOTES
10/19/2018    SUBJECTIVE:   53 y.o. female for annual exam.    12/8/14  Title of Operation:  1) Total laparoscopic hysterectomy  2) Bilateral laparoscopic salpingectomy  3) Bilateral laparoscopic uterosacral suspension  4) Cystourethroscopy  5) Posterior repair with perineorrhaphy  6) Intraoperative radiology films for incorrect count: positive for needle in pelvis, removed laparoscopically, repeat film negative and count correct.      Indications for Surgery:  1) UI: (+) UVALDO only if bladder really full and sneezes, not with exercise. UVALDO not daily. (--) pads, no underwear changes. Daytime frequency: Q 2 hours. Nocturia: Yes: 1/night, around 4 AM. (--) dysuria, (--) hematuria, (--) frequent UTIs. Has had 1-2 mild UTIs. (+) complete bladder emptying.  --UF prolonged but normal otherwise. PF incomplete void until catheters removed. Compliance normal. Max capacity >400 mL. DO (--). UVALDO (--).   2) POP: Absent. (--) vaginal bleeding. +nl menses on OCP. (--) vaginal discharge. (+) sexually active. (--) dyspareunia. (+) Vaginal dryness. (--) vaginal estrogen use.   --POP-Q: Aa -1; Ba -1; C -6; Ap 0; Bp 0 (very distal rectocele); D -9. Genital hiatus 3, perineal body 3, total vaginal length 10.  3) BM: (+) constipation/straining. (--) chronic diarrhea. (--) hematochezia. (--) fecal incontinence. (--) fecal smearing/urgency. (+) incomplete evacuation. No splinting.   4) ? IC: Dx last year per Dr. Labadie at [facility]. No cysto. Was counseled to eliminate exacerbating foods--did seem to help. Seems worse after on feet all day or with stress. Sx: frequency, urgency, feels some burning at the end of void, difficult evacuation. +relief with emptying. Still + menses--on OCP due to frequent menses. No correlation with menses. May have some flare with intercourse. Pain with flare: 6/10 only with urination, usually lasts x several weeks but gradually improves. Flares occur Q 2-3 months. Does have some anxiety. No previous treatment.    5) Patient desires ovarian preservation.      Preoperative Diagnosis:  1) Cystocele  2) Rectocele  3) Dysfunctional uterine bleeding  4) Urinary urgency/frequency  5) Concern for interstitial cystitis  6) Chronic constipation  7) Defecatory dysfunction  8) Mixed urinary incontinence     Postoperative Diagnosis:  1) Cystocele  2) Rectocele  3) Dysfunctional uterine bleeding  4) Urinary urgency/frequency  5) Concern for interstitial cystitis  6) Chronic constipation  7) Defecatory dysfunction  8) Mixed urinary incontinence       Past Medical History:   Diagnosis Date    Urinary tract infection     Uterine fibroid        Past Surgical History:   Procedure Laterality Date    CYSTOSCOPY N/A 12/8/2014    Performed [facility]ter [facility] at [facility]    HYSTERECTOMY      2014    HYSTERECTOMY-TOTAL LAPAROSCOPIC (TLH) N/A 12/8/2014    Performed by Beulah Gunter MD at [facility] OR [address] [place] [address]    LAPAROSCOPIC - UTEROSACRAL SUSPENSION  12/8/2014    Performed by [facility] at [facility]    LAPAROSCOPY N/A 12/8/2014    [facility]    PELVIC LAPAROSCOPY      uterine septum repair    REPAIR-POSTERIOR N/A 12/8/2014    Performed by [facility] at [facility]    RHINOPLASTY TIP  1980    SALPINGECTOMY-LAPAROSCOPIC Bilateral 12/8/2014    Performed by Beulah Gunter MD at [facility] OR [address] [place] [address]    TUBAL LIGATION  December 1999    Postpartum    umbilical cyst  2004       Current Outpatient Medications   Medication Sig Dispense Refill    ALOE VERA ORAL Take by mouth.      fish oil-omega-3 fatty acids 300-1,000 mg capsule Take 2 g by mouth once daily.      ibuprofen (ADVIL,MOTRIN) 600 MG tablet Take 1 tablet (600 mg total) by mouth 3 (three) times daily. 30 tablet 0    conjugated estrogens (PREMARIN) vaginal cream Vaginal atrophy (dryness):  Use dime size amount around vaginal opening nightly x 2 weeks, then twice a week thereafter. 30 g 3    LIDOCAINE 2 %,  "VALIUM 5 MG, BACLOFEN 4 % SUPPOSITORY INSERT 1 TO 2 SUPPOSITORY VAGINALLY EVERY DAY AS NEEDED  1    MYRBETRIQ 50 mg Tb24       UNABLE TO FIND medication name:  Cysto Renew       No current facility-administered medications for this visit.      Allergies: Patient has no known allergies.   Patient's last menstrual period was 11/30/2014.      Well Woman:  Pap:02/2013 normal- patient is post hyst- no further screening needed  Mammo:09/2017- normal  Colonoscopy:07/29/2016 normal- repeat in 10 years  Dexa:2014 normal per patient report      Family History  Family History   Problem Relation Age of Onset    Breast cancer Neg Hx     Ovarian cancer Neg Hx     Colon cancer Neg Hx     Diabetes Neg Hx     Hypertension Neg Hx     Stroke Neg Hx     Cervical cancer Neg Hx     Endometrial cancer Neg Hx     Vaginal cancer Neg Hx         ROS:  Feeling well.   No dyspnea or chest pain on exertion.    No abdominal pain, change in bowel habits, black or bloody stools.    No urinary tract symptoms.   IC:  Taking aloe vera leaf juice extract pills  --glucosamine + MSM tablets  --D-mannose  --no longer taking myrbetriq  --still doing pelvic floor PT home exercise program  GYN ROS: no breast pain or new or enlarging lumps on self exam, no vaginal bleeding.  Mild lower abdominal cramping   No neurological complaints.    OBJECTIVE:   The patient appears well, alert, oriented x 3, in no distress.  /60 (BP Location: Left arm, Patient Position: Sitting, BP Method: Small (Manual))   Ht 5' 5" (1.651 m)   Wt 50.7 kg (111 lb 12.4 oz)   LMP 11/30/2014   BMI 18.60 kg/m²   ENT normal.  Neck supple. No adenopathy or thyromegaly. FINA.   Lungs are clear, good air entry, no wheezes, rhonchi or rales.   S1 and S2 normal, no murmurs, regular rate and rhythm.   Abdomen soft without tenderness, guarding, mass or organomegaly.   Extremities show no edema, normal peripheral pulses.   Neurological is normal, no focal findings.    BREAST " EXAM: breasts appear normal, no suspicious masses, no skin or nipple changes or axillary nodes    PELVIC EXAM:   VULVA: normal appearing vulva with no masses, tenderness or lesions,   VAGINA: normal appearing vagina with normal color and discharge, no lesions, atrophic,  CERVIX: surgically absent,   UTERUS: absent,   ADNEXA: no masses,   RECTAL: normal rectal, no masses    ASSESSMENT:   1. Well woman exam     2. Vaginal atrophy  conjugated estrogens (PREMARIN) vaginal cream   3. Interstitial cystitis         PLAN:     1)well woman  --mammogram ordered  --please call to schedule    2)  IC  --Empty bladder every 3 hours.  Empty well: wait a minute, lean forward on toilet.    --Avoid dietary irritants (see sheet).  Keep diary x 3-5 days to determine your irritants.    --URGE: oxybutynin, trospium cause constipation. Uses uribel intermittently.  Use uribel post coital and any time you are having urgency.  --Recommend Prelief to help alkinize the urine:              --Prelief Tablets : Each tablet contains 345 mg calcium glycerophosphate (65 mg of elemental calcium). The tablets also contain 0.25% magnesium            stearate as a processing aid. Two tablets are equivalent to 690 mg calcium glycerophosphate (130mg of elemental calcium).              --Prelief Powder : Each ¼ teaspoon usage of powder is comparable to two tablets. The powder dissolves rapidly in food or non-alcoholic beverages.             Tablets are recommended for taking with alcoholic beverages              --Usage:                           --Tablets: 2-3 tablets, 2 times a day.                          --Powder: ¼ teaspoon of powder 2 times a day. More can be used when needed  --Hydrodistention With Cystoscopy: allows physicians to take a much closer look at the bladder wall. While the AUA no longer suggests that it be used as a diagnostic method (unless a diagnosis is in doubt), hydrodistention may provide modest relief in IC symptoms which can  last from three to six months.   --continue  pelvic floor PT exercises  --continue d-mannose, aloe vera, glucosamine +MSM        3)Vaginal atrophy (dryness):   --premarin dime size amount around vaginal opening     4)Constipation controlled- continue fiber  Constipation:  Controlling constipation may help bladder urgency/leakage and fiber may better control cholesterol and blood glucose.  Start daily fiber.  Take 1 tsp of fiber powder (psyllium or other sugar-free powder).  Mix in 8 oz of water.  Take x 3-5 days.  Then, increase fiber by 1 tsp every 3-5 days until stool is easy to pass.  Stop and continue at that dose.   Do not exceed 6 tsps/day.  May also use over the counter stool softener 1-2 x/day.  AVOID laxatives.     5)RTC 1 year for annual        30 minutes were spent in face to face time with this patient  90 % of this time was spent in counseling and/or coordination of care  Leona MORELAND Marchand [facility]  Division of Female Pelvic Medicine and Reconstructive Surgery  Department of Obstetrics & Gynecology

## 2018-10-19 NOTE — PATIENT INSTRUCTIONS
1)well woman  --mammogram ordered  --please call to schedule    2)  IC  --Empty bladder every 3 hours.  Empty well: wait a minute, lean forward on toilet.    --Avoid dietary irritants (see sheet).  Keep diary x 3-5 days to determine your irritants.    --URGE: oxybutynin, trospium cause constipation. Uses uribel intermittently.  Use uribel post coital and any time you are having urgency.  --Recommend Prelief to help alkinize the urine:              --Prelief Tablets : Each tablet contains 345 mg calcium glycerophosphate (65 mg of elemental calcium). The tablets also contain 0.25% magnesium            stearate as a processing aid. Two tablets are equivalent to 690 mg calcium glycerophosphate (130mg of elemental calcium).              --Prelief Powder : Each ¼ teaspoon usage of powder is comparable to two tablets. The powder dissolves rapidly in food or non-alcoholic beverages.             Tablets are recommended for taking with alcoholic beverages              --Usage:                           --Tablets: 2-3 tablets, 2 times a day.                          --Powder: ¼ teaspoon of powder 2 times a day. More can be used when needed  --Hydrodistention With Cystoscopy: allows physicians to take a much closer look at the bladder wall. While the AUA no longer suggests that it be used as a diagnostic method (unless a diagnosis is in doubt), hydrodistention may provide modest relief in IC symptoms which can last from three to six months.   --continue  pelvic floor PT exercises  --continue d-mannose, aloe vera, glucosamine +MSM        3)Vaginal atrophy (dryness):   --premarin dime size amount around vaginal opening     4)Constipation controlled- continue fiber  Constipation:  Controlling constipation may help bladder urgency/leakage and fiber may better control cholesterol and blood glucose.  Start daily fiber.  Take 1 tsp of fiber powder (psyllium or other sugar-free powder).  Mix in 8 oz of water.  Take x 3-5 days.  Then,  increase fiber by 1 tsp every 3-5 days until stool is easy to pass.  Stop and continue at that dose.   Do not exceed 6 tsps/day.  May also use over the counter stool softener 1-2 x/day.  AVOID laxatives.     5)RTC 1 year for annual

## 2018-10-26 ENCOUNTER — HOSPITAL ENCOUNTER (OUTPATIENT)
Dept: RADIOLOGY | Facility: HOSPITAL | Age: 53
Discharge: HOME | End: 2018-10-26
Attending: NURSE PRACTITIONER
Payer: COMMERCIAL

## 2018-10-26 DIAGNOSIS — Z12.39 BREAST CANCER SCREENING: ICD-10-CM

## 2018-10-26 PROCEDURE — 77067 SCR MAMMO BI INCL CAD: CPT | Mod: 26,,, | Performed by: RADIOLOGY

## 2018-10-26 PROCEDURE — 77063 BREAST TOMOSYNTHESIS BI: CPT | Mod: TC

## 2018-10-26 PROCEDURE — 77063 BREAST TOMOSYNTHESIS BI: CPT | Mod: 26,,, | Performed by: RADIOLOGY

## 2018-10-29 ENCOUNTER — PATIENT MESSAGE (OUTPATIENT)
Dept: UROGYNECOLOGY | Facility: CLINIC | Age: 53
End: 2018-10-29

## 2018-12-03 ENCOUNTER — TELEPHONE (OUTPATIENT)
Dept: UROGYNECOLOGY | Facility: CLINIC | Age: 53
End: 2018-12-03

## 2018-12-03 ENCOUNTER — HOSPITAL ENCOUNTER (OUTPATIENT)
Dept: RADIOLOGY | Facility: OTHER | Age: 53
Discharge: HOME | End: 2018-12-03
Attending: NURSE PRACTITIONER
Payer: COMMERCIAL

## 2018-12-03 ENCOUNTER — OFFICE VISIT (OUTPATIENT)
Dept: UROGYNECOLOGY | Facility: CLINIC | Age: 53
End: 2018-12-03
Payer: COMMERCIAL

## 2018-12-03 VITALS
DIASTOLIC BLOOD PRESSURE: 60 MMHG | HEIGHT: 65 IN | SYSTOLIC BLOOD PRESSURE: 100 MMHG | BODY MASS INDEX: 18.26 KG/M2 | WEIGHT: 109.56 LBS

## 2018-12-03 DIAGNOSIS — N30.10 INTERSTITIAL CYSTITIS: ICD-10-CM

## 2018-12-03 DIAGNOSIS — R10.9 LEFT FLANK PAIN: ICD-10-CM

## 2018-12-03 DIAGNOSIS — N94.9 ADNEXAL FULLNESS: Primary | ICD-10-CM

## 2018-12-03 DIAGNOSIS — R39.15 URINARY URGENCY: ICD-10-CM

## 2018-12-03 DIAGNOSIS — N94.9 ADNEXAL FULLNESS: ICD-10-CM

## 2018-12-03 PROCEDURE — 76856 US EXAM PELVIC COMPLETE: CPT | Mod: TC

## 2018-12-03 PROCEDURE — 76856 US EXAM PELVIC COMPLETE: CPT | Mod: 26,,, | Performed by: RADIOLOGY

## 2018-12-03 PROCEDURE — 87186 SC STD MICRODIL/AGAR DIL: CPT

## 2018-12-03 PROCEDURE — 76770 US EXAM ABDO BACK WALL COMP: CPT | Mod: 26,,, | Performed by: RADIOLOGY

## 2018-12-03 PROCEDURE — 99999 PR PBB SHADOW E&M-EST. PATIENT-LVL IV: CPT | Mod: PBBFAC,,, | Performed by: NURSE PRACTITIONER

## 2018-12-03 PROCEDURE — 87086 URINE CULTURE/COLONY COUNT: CPT

## 2018-12-03 PROCEDURE — 87077 CULTURE AEROBIC IDENTIFY: CPT

## 2018-12-03 PROCEDURE — 76770 US EXAM ABDO BACK WALL COMP: CPT | Mod: TC

## 2018-12-03 PROCEDURE — 3008F BODY MASS INDEX DOCD: CPT | Mod: CPTII,S$GLB,, | Performed by: NURSE PRACTITIONER

## 2018-12-03 PROCEDURE — 99214 OFFICE O/P EST MOD 30 MIN: CPT | Mod: S$GLB,,, | Performed by: NURSE PRACTITIONER

## 2018-12-03 PROCEDURE — 87088 URINE BACTERIA CULTURE: CPT

## 2018-12-03 PROCEDURE — 76830 TRANSVAGINAL US NON-OB: CPT | Mod: 26,,, | Performed by: RADIOLOGY

## 2018-12-03 NOTE — TELEPHONE ENCOUNTER
Spoke with pt. Who stated she seems to think she could have Kidney Stones, she had severe pain last Friday (Pain at a 10), today at a (6), is taking Ibuprofen and advil,  Having burning when she urinates, spasms after emptying her bladder, having lower back pain, and very concerned. I explained to her that dr. Gunter is in surgery today, and will forward message to DEYA Delgadillo she said okay. Once I speak with DEYA Delgadillo I will let her know whether to bring in a urine specimen or come in and see NP.  She also  said she does have IC.     NO fever  NO bleeeding     Please advice:      Patient called regarding scheduling. The patient believes she may have kidney stones and would like to address this issue as soon as possible. When asked on the scale of 1-10 how bad is the pain? the patient replied,  it's a 6 but on Friday night it was a 10. The patient can be reached at [phone]

## 2018-12-03 NOTE — PROGRESS NOTES
12/03/2018    SUBJECTIVE:   53 y.o. female for complaints of LLQ pain--radiates to L posterior hip/back and down L thigh.  Did have urgency/ frequency last week.  Pain is intermittent-- started on Tuesday, was worse on Saturday, but still present.  Pain is constant ache but waves of stronger aches.    History of IC  --Taking aloe vera leaf juice extract pills  --glucosamine + MSM tablets  --D-mannose  --no longer taking myrbetriq  --still doing pelvic floor PT home exercise program    12/8/14  Title of Operation:  1) Total laparoscopic hysterectomy  2) Bilateral laparoscopic salpingectomy  3) Bilateral laparoscopic uterosacral suspension  4) Cystourethroscopy  5) Posterior repair with perineorrhaphy  6) Intraoperative radiology films for incorrect count: positive for needle in pelvis, removed laparoscopically, repeat film negative and count correct.      Indications for Surgery:  1) UI: (+) UVALDO only if bladder really full and sneezes, not with exercise. UVALDO not daily. (--) pads, no underwear changes. Daytime frequency: Q 2 hours. Nocturia: Yes: 1/night, around 4 AM. (--) dysuria, (--) hematuria, (--) frequent UTIs. Has had 1-2 mild UTIs. (+) complete bladder emptying.  --UF prolonged but normal otherwise. PF incomplete void until catheters removed. Compliance normal. Max capacity >400 mL. DO (--). UVALDO (--).   2) POP: Absent. (--) vaginal bleeding. +nl menses on OCP. (--) vaginal discharge. (+) sexually active. (--) dyspareunia. (+) Vaginal dryness. (--) vaginal estrogen use.   --POP-Q: Aa -1; Ba -1; C -6; Ap 0; Bp 0 (very distal rectocele); D -9. Genital hiatus 3, perineal body 3, total vaginal length 10.  3) BM: (+) constipation/straining. (--) chronic diarrhea. (--) hematochezia. (--) fecal incontinence. (--) fecal smearing/urgency. (+) incomplete evacuation. No splinting.   4) ? IC: Dx last year per Dr. Labadie at [facility]. No cysto. Was counseled to eliminate exacerbating foods--did seem to help. Seems worse after  on feet all day or with stress. Sx: frequency, urgency, feels some burning at the end of void, difficult evacuation. +relief with emptying. Still + menses--on OCP due to frequent menses. No correlation with menses. May have some flare with intercourse. Pain with flare: 6/10 only with urination, usually lasts x several weeks but gradually improves. Flares occur Q 2-3 months. Does have some anxiety. No previous treatment.   5) Patient desires ovarian preservation.      Preoperative Diagnosis:  1) Cystocele  2) Rectocele  3) Dysfunctional uterine bleeding  4) Urinary urgency/frequency  5) Concern for interstitial cystitis  6) Chronic constipation  7) Defecatory dysfunction  8) Mixed urinary incontinence     Postoperative Diagnosis:  1) Cystocele  2) Rectocele  3) Dysfunctional uterine bleeding  4) Urinary urgency/frequency  5) Concern for interstitial cystitis  6) Chronic constipation  7) Defecatory dysfunction  8) Mixed urinary incontinence       Past Medical History:   Diagnosis Date    Urinary tract infection     Uterine fibroid        Past Surgical History:   Procedure Laterality Date    CYSTOSCOPY N/A 12/8/2014    [facility]    HYSTERECTOMY      2014    HYSTERECTOMY-TOTAL LAPAROSCOPIC (TLH) N/A 12/8/2014    [facility]    LAPAROSCOPIC - UTEROSACRAL SUSPENSION  12/8/2014    [facility]    LAPAROSCOPY N/A 12/8/2014    [facility]    PELVIC LAPAROSCOPY      uterine septum repair    REPAIR-POSTERIOR N/A 12/8/2014    [facility]    RHINOPLASTY TIP  1980    SALPINGECTOMY-LAPAROSCOPIC Bilateral 12/8/2014    Per[facility] at [facility] OR [facility]    TUBAL LIGATION  December 1999    Postpartum    umbilical cyst  2004       Current Outpatient Medications   Medication Sig Dispense Refill    ALOE VERA  "ORAL Take by mouth.      fish oil-omega-3 fatty acids 300-1,000 mg capsule Take 2 g by mouth once daily.      ibuprofen (ADVIL,MOTRIN) 600 MG tablet Take 1 tablet (600 mg total) by mouth 3 (three) times daily. 30 tablet 0    LIDOCAINE 2 %, VALIUM 5 MG, BACLOFEN 4 % SUPPOSITORY INSERT 1 TO 2 SUPPOSITORY VAGINALLY EVERY DAY AS NEEDED  1    MYRBETRIQ 50 mg Tb24       UNABLE TO FIND medication name:  Cysto Renew       No current facility-administered medications for this visit.      Allergies: Patient has no known allergies.   Patient's last menstrual period was 11/30/2014.      Well Woman:  Pap:02/2013 normal- patient is post hyst- no further screening needed  Mammo:09/2017- normal  Colonoscopy:07/29/2016 normal- repeat in 10 years  Dexa:2014 normal per patient report      Family History  Family History   Problem Relation Age of Onset    Breast cancer Maternal Cousin     Ovarian cancer Maternal Cousin     Colon cancer Neg Hx     Diabetes Neg Hx     Hypertension Neg Hx     Stroke Neg Hx     Cervical cancer Neg Hx     Endometrial cancer Neg Hx     Vaginal cancer Neg Hx           OBJECTIVE:   The patient appears well, alert, oriented x 3, in no distress.  /60 (BP Location: Right arm, Patient Position: Sitting, BP Method: Medium (Manual))   Ht 5' 5" (1.651 m)   Wt 49.7 kg (109 lb 9.1 oz)   LMP 11/30/2014   BMI 18.23 kg/m²    Abdomen soft without tenderness, guarding, mass or organomegaly. No CVA tenderness  Extremities show no edema, normal peripheral pulses.   Neurological is normal, no focal findings.    PELVIC EXAM:   VULVA: normal appearing vulva with no masses, tenderness or lesions,   VAGINA: normal appearing vagina with normal color and discharge, no lesions, atrophic,  CERVIX: surgically absent,   UTERUS: absent,   ADNEXA: R adnexal fullness/tenderness noted  RECTAL: normal rectal, no masses    ASSESSMENT:   1. Adnexal fullness  US Pelvis Comp with Transvag NON-OB (xpd   2. Urinary urgency  " Urine culture    US Retroperitoneal Complete   3. Left flank pain  US Retroperitoneal Complete   4. Interstitial cystitis         PLAN:     1)pelvic pain/ R adnexal fullnes  --pelvic ultrasound ordered    2)  IC  --Empty bladder every 3 hours.  Empty well: wait a minute, lean forward on toilet.    --Avoid dietary irritants (see sheet).  Keep diary x 3-5 days to determine your irritants.    --URGE: oxybutynin, trospium cause constipation. Uses uribel intermittently.  Use uribel post coital and any time you are having urgency.  --Recommend Prelief to help alkinize the urine:              --Prelief Tablets : Each tablet contains 345 mg calcium glycerophosphate (65 mg of elemental calcium). The tablets also contain 0.25% magnesium            stearate as a processing aid. Two tablets are equivalent to 690 mg calcium glycerophosphate (130mg of elemental calcium).              --Prelief Powder : Each ¼ teaspoon usage of powder is comparable to two tablets. The powder dissolves rapidly in food or non-alcoholic beverages.             Tablets are recommended for taking with alcoholic beverages              --Usage:                           --Tablets: 2-3 tablets, 2 times a day.                          --Powder: ¼ teaspoon of powder 2 times a day. More can be used when needed  --Hydrodistention With Cystoscopy: allows physicians to take a much closer look at the bladder wall. While the AUA no longer suggests that it be used as a diagnostic method (unless a diagnosis is in doubt), hydrodistention may provide modest relief in IC symptoms which can last from three to six months.   --continue  pelvic floor PT exercises  --continue d-mannose, aloe vera, glucosamine +MSM        3)Vaginal atrophy (dryness):   --premarin dime size amount around vaginal opening     4)Constipation controlled- continue fiber  Constipation:  Controlling constipation may help bladder urgency/leakage and fiber may better control cholesterol and blood  glucose.  Start daily fiber.  Take 1 tsp of fiber powder (psyllium or other sugar-free powder).  Mix in 8 oz of water.  Take x 3-5 days.  Then, increase fiber by 1 tsp every 3-5 days until stool is easy to pass.  Stop and continue at that dose.   Do not exceed 6 tsps/day.  May also use over the counter stool softener 1-2 x/day.  AVOID laxatives.     5)LLQ pain/ back pain  --consider renal stone CT if pelvic ultrasound is negative    6)RTC pending results      30 minutes were spent in face to face time with this patient  90 % of this time was spent in counseling and/or coordination of care  Leona MORELAND Marchand [facility]  Division of Female Pelvic Medicine and Reconstructive Surgery  Department of Obstetrics & Gynecology

## 2018-12-03 NOTE — TELEPHONE ENCOUNTER
Reviewed pelvic and retroperitoneal ultrasound results with patient.  Will continue to monitor.  Instructed on using otc nsaids and uribel.  GILDARDO Mccarthy-BC

## 2018-12-03 NOTE — TELEPHONE ENCOUNTER
Describes sharp pain in LLQ radiating laterally over hip and down L thigh.  Pain is improving but still present. Appointment scheduled.

## 2018-12-03 NOTE — TELEPHONE ENCOUNTER
----- Message from Venessa Caballero sent at 12/3/2018  8:09 AM CST -----  Contact: Patient   Patient called regarding scheduling. The patient believes she may have kidney stones and would like to address this issue as soon as possible. When asked on the scale of 1-10 how bad is the pain? the patient replied,  it's a 6 but on Friday night it was a 10. The patient can be reached at [phone].

## 2018-12-03 NOTE — TELEPHONE ENCOUNTER
----- Message from Venessa Caballero sent at 12/3/2018  8:14 AM CST -----  Contact: Patient   Patient called regarding scheduling. The patient believes she may have kidney stones and would like to address this issue as soon as possible. When asked on the scale of 1-10 how bad is the pain? the patient replied,  it's a 6 but on Friday night it was a 10. The patient can be reached at [phone].

## 2018-12-03 NOTE — PATIENT INSTRUCTIONS
1)pelvic pain/ R adnexal fullnes  --pelvic ultrasound ordered    2)  IC  --Empty bladder every 3 hours.  Empty well: wait a minute, lean forward on toilet.    --Avoid dietary irritants (see sheet).  Keep diary x 3-5 days to determine your irritants.    --URGE: oxybutynin, trospium cause constipation. Uses uribel intermittently.  Use uribel post coital and any time you are having urgency.  --Recommend Prelief to help alkinize the urine:              --Prelief Tablets : Each tablet contains 345 mg calcium glycerophosphate (65 mg of elemental calcium). The tablets also contain 0.25% magnesium            stearate as a processing aid. Two tablets are equivalent to 690 mg calcium glycerophosphate (130mg of elemental calcium).              --Prelief Powder : Each ¼ teaspoon usage of powder is comparable to two tablets. The powder dissolves rapidly in food or non-alcoholic beverages.             Tablets are recommended for taking with alcoholic beverages              --Usage:                           --Tablets: 2-3 tablets, 2 times a day.                          --Powder: ¼ teaspoon of powder 2 times a day. More can be used when needed  --Hydrodistention With Cystoscopy: allows physicians to take a much closer look at the bladder wall. While the AUA no longer suggests that it be used as a diagnostic method (unless a diagnosis is in doubt), hydrodistention may provide modest relief in IC symptoms which can last from three to six months.   --continue  pelvic floor PT exercises  --continue d-mannose, aloe vera, glucosamine +MSM        3)Vaginal atrophy (dryness):   --premarin dime size amount around vaginal opening     4)Constipation controlled- continue fiber  Constipation:  Controlling constipation may help bladder urgency/leakage and fiber may better control cholesterol and blood glucose.  Start daily fiber.  Take 1 tsp of fiber powder (psyllium or other sugar-free powder).  Mix in 8 oz of water.  Take x 3-5 days.  Then,  increase fiber by 1 tsp every 3-5 days until stool is easy to pass.  Stop and continue at that dose.   Do not exceed 6 tsps/day.  May also use over the counter stool softener 1-2 x/day.  AVOID laxatives.     5)LLQ pain/ back pain  --retroperitoneal ultrasound     6)RTC pending results

## 2018-12-05 LAB — BACTERIA UR CULT: NORMAL

## 2018-12-06 ENCOUNTER — PATIENT MESSAGE (OUTPATIENT)
Dept: UROGYNECOLOGY | Facility: CLINIC | Age: 53
End: 2018-12-06

## 2018-12-06 DIAGNOSIS — N30.00 ACUTE CYSTITIS WITHOUT HEMATURIA: Primary | ICD-10-CM

## 2018-12-06 RX ORDER — NITROFURANTOIN 25; 75 MG/1; MG/1
100 CAPSULE ORAL 2 TIMES DAILY
Qty: 14 CAPSULE | Refills: 0 | Status: SHIPPED | OUTPATIENT
Start: 2018-12-06 | End: 2019-01-25

## 2018-12-06 NOTE — TELEPHONE ENCOUNTER
Spoke with pt relayed message that she has  UTI and macrobid was sent to her pharmacy, pt voiced understanding and call was ended.

## 2019-01-10 RX ORDER — METHENAMINE, SODIUM PHOSPHATE, MONOBASIC, MONOHYDRATE, PHENYL SALICYLATE, METHYLENE BLUE, AND HYOSCYAMINE SULFATE 118; 40.8; 36; 10; .12 MG/1; MG/1; MG/1; MG/1; MG/1
CAPSULE ORAL
Qty: 120 CAPSULE | Refills: 3 | Status: SHIPPED | OUTPATIENT
Start: 2019-01-10 | End: 2019-06-28 | Stop reason: SDUPTHER

## 2019-01-25 ENCOUNTER — OFFICE VISIT (OUTPATIENT)
Dept: URGENT CARE | Facility: CLINIC | Age: 54
End: 2019-01-25
Payer: COMMERCIAL

## 2019-01-25 VITALS
DIASTOLIC BLOOD PRESSURE: 73 MMHG | OXYGEN SATURATION: 98 % | SYSTOLIC BLOOD PRESSURE: 117 MMHG | HEIGHT: 65 IN | BODY MASS INDEX: 17.99 KG/M2 | WEIGHT: 108 LBS | HEART RATE: 72 BPM | RESPIRATION RATE: 18 BRPM | TEMPERATURE: 99 F

## 2019-01-25 DIAGNOSIS — J01.00 ACUTE MAXILLARY SINUSITIS, RECURRENCE NOT SPECIFIED: Primary | ICD-10-CM

## 2019-01-25 DIAGNOSIS — J02.9 SORE THROAT: ICD-10-CM

## 2019-01-25 LAB
CTP QC/QA: YES
S PYO RRNA THROAT QL PROBE: NEGATIVE

## 2019-01-25 PROCEDURE — 87880 STREP A ASSAY W/OPTIC: CPT | Mod: QW,S$GLB,, | Performed by: NURSE PRACTITIONER

## 2019-01-25 PROCEDURE — 3008F BODY MASS INDEX DOCD: CPT | Mod: CPTII,S$GLB,, | Performed by: NURSE PRACTITIONER

## 2019-01-25 PROCEDURE — 3008F PR BODY MASS INDEX (BMI) DOCUMENTED: ICD-10-PCS | Mod: CPTII,S$GLB,, | Performed by: NURSE PRACTITIONER

## 2019-01-25 PROCEDURE — 87880 POCT RAPID STREP A: ICD-10-PCS | Mod: QW,S$GLB,, | Performed by: NURSE PRACTITIONER

## 2019-01-25 PROCEDURE — 99213 PR OFFICE/OUTPT VISIT, EST, LEVL III, 20-29 MIN: ICD-10-PCS | Mod: S$GLB,,, | Performed by: NURSE PRACTITIONER

## 2019-01-25 PROCEDURE — 99213 OFFICE O/P EST LOW 20 MIN: CPT | Mod: S$GLB,,, | Performed by: NURSE PRACTITIONER

## 2019-01-25 RX ORDER — CEFDINIR 300 MG/1
300 CAPSULE ORAL 2 TIMES DAILY
Qty: 20 CAPSULE | Refills: 0 | Status: SHIPPED | OUTPATIENT
Start: 2019-01-25 | End: 2019-02-04

## 2019-01-25 RX ORDER — PREDNISONE 20 MG/1
20 TABLET ORAL DAILY
Qty: 5 TABLET | Refills: 0 | Status: SHIPPED | OUTPATIENT
Start: 2019-01-25 | End: 2019-01-30

## 2019-01-25 RX ORDER — BENZONATATE 100 MG/1
200 CAPSULE ORAL 3 TIMES DAILY PRN
Qty: 30 CAPSULE | Refills: 0 | Status: SHIPPED | OUTPATIENT
Start: 2019-01-25 | End: 2019-07-03

## 2019-01-25 RX ORDER — PROMETHAZINE HYDROCHLORIDE AND DEXTROMETHORPHAN HYDROBROMIDE 6.25; 15 MG/5ML; MG/5ML
5 SYRUP ORAL EVERY 6 HOURS PRN
Qty: 180 ML | Refills: 0 | Status: SHIPPED | OUTPATIENT
Start: 2019-01-25 | End: 2019-02-04

## 2019-01-25 NOTE — PROGRESS NOTES
"Subjective:       Patient ID: Tara Unger is a 53 y.o. female.    Vitals:  height is 5' 5" (1.651 m) and weight is 49 kg (108 lb). Her oral temperature is 98.6 °F (37 °C). Her blood pressure is 117/73 and her pulse is 72. Her respiration is 18 and oxygen saturation is 98%.     Chief Complaint: Sore Throat    Sore Throat    This is a new problem. The current episode started in the past 7 days. The problem has been gradually worsening. The pain is worse on the left side. The pain is at a severity of 8/10. The pain is moderate. Associated symptoms include congestion, ear pain, headaches, a hoarse voice, swollen glands and trouble swallowing. Pertinent negatives include no coughing, shortness of breath, stridor or vomiting. She has tried acetaminophen and gargles for the symptoms. The treatment provided no relief.       Constitution: Positive for chills and fatigue. Negative for sweating and fever.   HENT: Positive for ear pain, congestion, sore throat, trouble swallowing and voice change. Negative for sinus pain and sinus pressure.    Neck: Negative for painful lymph nodes.   Eyes: Negative for eye redness.   Respiratory: Negative for chest tightness, cough, sputum production, bloody sputum, COPD, shortness of breath, stridor, wheezing and asthma.    Gastrointestinal: Negative for nausea and vomiting.   Musculoskeletal: Positive for muscle ache.   Skin: Negative for rash.   Allergic/Immunologic: Positive for seasonal allergies. Negative for asthma.   Neurological: Positive for headaches.   Hematologic/Lymphatic: Negative for swollen lymph nodes.       Objective:      Physical Exam   Constitutional: She is oriented to person, place, and time. She appears well-developed and well-nourished. She is cooperative.  Non-toxic appearance. She does not appear ill. No distress.   HENT:   Head: Normocephalic and atraumatic.   Right Ear: Hearing and ear canal normal. There is swelling. A middle ear effusion is present.   Left " Ear: Hearing and ear canal normal. There is swelling. A middle ear effusion is present.   Nose: Mucosal edema and sinus tenderness present. No rhinorrhea or nasal deformity. No epistaxis. Right sinus exhibits frontal sinus tenderness. Right sinus exhibits no maxillary sinus tenderness. Left sinus exhibits frontal sinus tenderness. Left sinus exhibits no maxillary sinus tenderness.   Mouth/Throat: Uvula is midline and mucous membranes are normal. No trismus in the jaw. Normal dentition. No uvula swelling. Posterior oropharyngeal edema and posterior oropharyngeal erythema present.   Eyes: Conjunctivae and lids are normal. Right eye exhibits no discharge. Left eye exhibits no discharge. No scleral icterus.   Sclera clear bilat   Neck: Trachea normal, normal range of motion, full passive range of motion without pain and phonation normal. Neck supple.   Cardiovascular: Normal rate, regular rhythm, normal heart sounds, intact distal pulses and normal pulses.   Pulmonary/Chest: Effort normal and breath sounds normal. No respiratory distress.   Abdominal: Soft. Normal appearance and bowel sounds are normal. She exhibits no distension, no pulsatile midline mass and no mass. There is no tenderness.   Musculoskeletal: Normal range of motion. She exhibits no edema or deformity.   Neurological: She is alert and oriented to person, place, and time. She exhibits normal muscle tone. Coordination normal.   Skin: Skin is warm, dry and intact. She is not diaphoretic. No pallor.   Psychiatric: She has a normal mood and affect. Her speech is normal and behavior is normal. Judgment and thought content normal. Cognition and memory are normal.   Nursing note and vitals reviewed.      Assessment:       1. Acute maxillary sinusitis, recurrence not specified    2. Sore throat        Plan:       Patient Instructions     Sinusitis (Antibiotic Treatment)    The sinuses are air-filled spaces within the bones of the face. They connect to the  inside of the nose. Sinusitis is an inflammation of the tissue lining the sinus cavity. Sinus inflammation can occur during a cold. It can also be due to allergies to pollens and other particles in the air. Sinusitis can cause symptoms of sinus congestion and fullness. A sinus infection causes fever, headache and facial pain. There is often green or yellow drainage from the nose or into the back of the throat (post-nasal drip). You have been given antibiotics to treat this condition.  Home care:  · Take the full course of antibiotics as instructed. Do not stop taking them, even if you feel better.  · Drink plenty of water, hot tea, and other liquids. This may help thin mucus. It also may promote sinus drainage.  · Heat may help soothe painful areas of the face. Use a towel soaked in hot water. Or,  the shower and direct the hot spray onto your face. Using a vaporizer along with a menthol rub at night may also help.   · An expectorant containing guaifenesin may help thin the mucus and promote drainage from the sinuses.  · Over-the-counter decongestants may be used unless a similar medicine was prescribed. Nasal sprays work the fastest. Use one that contains phenylephrine or oxymetazoline. First blow the nose gently. Then use the spray. Do not use these medicines more often than directed on the label or symptoms may get worse. You may also use tablets containing pseudoephedrine. Avoid products that combine ingredients, because side effects may be increased. Read labels. You can also ask the pharmacist for help. (NOTE: Persons with high blood pressure should not use decongestants. They can raise blood pressure.)  · Over-the-counter antihistamines may help if allergies contributed to your sinusitis.    · Do not use nasal rinses or irrigation during an acute sinus infection, unless told to by your health care provider. Rinsing may spread the infection to other sinuses.  · Use acetaminophen or ibuprofen to control  pain, unless another pain medicine was prescribed. (If you have chronic liver or kidney disease or ever had a stomach ulcer, talk with your doctor before using these medicines. Aspirin should never be used in anyone under 18 years of age who is ill with a fever. It may cause severe liver damage.)  · Don't smoke. This can worsen symptoms.  Follow-up care  Follow up with your healthcare provider or our staff if you are not improving within the next week.  When to seek medical advice  Call your healthcare provider if any of these occur:  · Facial pain or headache becoming more severe  · Stiff neck  · Unusual drowsiness or confusion  · Swelling of the forehead or eyelids  · Vision problems, including blurred or double vision  · Fever of 100.4ºF (38ºC) or higher, or as directed by your healthcare provider  · Seizure  · Breathing problems  · Symptoms not resolving within 10 days  Date Last Reviewed: 4/13/2015  © 2911-3906 [employer]. [address] [place] [address]. All rights reserved. This information is not intended as a substitute for professional medical care. Always follow your healthcare professional's instructions.              Acute maxillary sinusitis, recurrence not specified    Sore throat  -     POCT rapid strep A    Other orders  -     predniSONE (DELTASONE) 20 MG tablet; Take 1 tablet (20 mg total) by mouth once daily. for 5 days  Dispense: 5 tablet; Refill: 0  -     cefdinir (OMNICEF) 300 MG capsule; Take 1 capsule (300 mg total) by mouth 2 (two) times daily. for 10 days  Dispense: 20 capsule; Refill: 0  -     diphenhydrAMINE-aluminum-magnesium hydroxide-simethicone-lidocaine HCl 2%; Swish and spit 15 mLs every 4 (four) hours as needed.  Dispense: 180 mL; Refill: 0  -     benzonatate (TESSALON PERLES) 100 MG capsule; Take 2 capsules (200 mg total) by mouth 3 (three) times daily as needed.  Dispense: 30 capsule; Refill: 0  -     promethazine-dextromethorphan (PROMETHAZINE-DM)  6.25-15 mg/5 mL Syrp; Take 5 mLs by mouth every 6 (six) hours as needed.  Dispense: 180 mL; Refill: 0

## 2019-01-26 NOTE — PATIENT INSTRUCTIONS
Sinusitis (Antibiotic Treatment)    The sinuses are air-filled spaces within the bones of the face. They connect to the inside of the nose. Sinusitis is an inflammation of the tissue lining the sinus cavity. Sinus inflammation can occur during a cold. It can also be due to allergies to pollens and other particles in the air. Sinusitis can cause symptoms of sinus congestion and fullness. A sinus infection causes fever, headache and facial pain. There is often green or yellow drainage from the nose or into the back of the throat (post-nasal drip). You have been given antibiotics to treat this condition.  Home care:  · Take the full course of antibiotics as instructed. Do not stop taking them, even if you feel better.  · Drink plenty of water, hot tea, and other liquids. This may help thin mucus. It also may promote sinus drainage.  · Heat may help soothe painful areas of the face. Use a towel soaked in hot water. Or,  the shower and direct the hot spray onto your face. Using a vaporizer along with a menthol rub at night may also help.   · An expectorant containing guaifenesin may help thin the mucus and promote drainage from the sinuses.  · Over-the-counter decongestants may be used unless a similar medicine was prescribed. Nasal sprays work the fastest. Use one that contains phenylephrine or oxymetazoline. First blow the nose gently. Then use the spray. Do not use these medicines more often than directed on the label or symptoms may get worse. You may also use tablets containing pseudoephedrine. Avoid products that combine ingredients, because side effects may be increased. Read labels. You can also ask the pharmacist for help. (NOTE: Persons with high blood pressure should not use decongestants. They can raise blood pressure.)  · Over-the-counter antihistamines may help if allergies contributed to your sinusitis.    · Do not use nasal rinses or irrigation during an acute sinus infection, unless told to by  your health care provider. Rinsing may spread the infection to other sinuses.  · Use acetaminophen or ibuprofen to control pain, unless another pain medicine was prescribed. (If you have chronic liver or kidney disease or ever had a stomach ulcer, talk with your doctor before using these medicines. Aspirin should never be used in anyone under 18 years of age who is ill with a fever. It may cause severe liver damage.)  · Don't smoke. This can worsen symptoms.  Follow-up care  Follow up with your healthcare provider or our staff if you are not improving within the next week.  When to seek medical advice  Call your healthcare provider if any of these occur:  · Facial pain or headache becoming more severe  · Stiff neck  · Unusual drowsiness or confusion  · Swelling of the forehead or eyelids  · Vision problems, including blurred or double vision  · Fever of 100.4ºF (38ºC) or higher, or as directed by your healthcare provider  · Seizure  · Breathing problems  · Symptoms not resolving within 10 days  Date Last Reviewed: 4/13/2015  © 9635-8382 [employer]. [address] [place] [address], [place] [address]. All rights reserved. This information is not intended as a substitute for professional medical care. Always follow your healthcare professional's instructions.

## 2019-04-26 ENCOUNTER — PATIENT MESSAGE (OUTPATIENT)
Dept: UROGYNECOLOGY | Facility: CLINIC | Age: 54
End: 2019-04-26

## 2019-06-06 ENCOUNTER — TELEPHONE (OUTPATIENT)
Dept: UROGYNECOLOGY | Facility: CLINIC | Age: 54
End: 2019-06-06

## 2019-06-06 NOTE — TELEPHONE ENCOUNTER
Spoke with pt who states she is going out of town on tomorrow and wanted to see if she could be fit into the schedule tomorrow for a bladder instillation, pt advised NP Bobo is out the office and the covering physician will be in surgery tomorrow reviewed prescribed medications with pt she states none of it really helps she will call if she starts to have symptoms and contact the office on Monday for a follow up appointment she voiced understanding and call was ended.

## 2019-06-28 RX ORDER — METHENAMINE, SODIUM PHOSPHATE, MONOBASIC, MONOHYDRATE, PHENYL SALICYLATE, METHYLENE BLUE, AND HYOSCYAMINE SULFATE 118; 40.8; 36; 10; .12 MG/1; MG/1; MG/1; MG/1; MG/1
CAPSULE ORAL
Qty: 120 CAPSULE | Refills: 0 | Status: SHIPPED | OUTPATIENT
Start: 2019-06-28 | End: 2019-09-08 | Stop reason: SDUPTHER

## 2019-07-03 ENCOUNTER — OFFICE VISIT (OUTPATIENT)
Dept: UROGYNECOLOGY | Facility: CLINIC | Age: 54
End: 2019-07-03
Payer: COMMERCIAL

## 2019-07-03 VITALS
SYSTOLIC BLOOD PRESSURE: 120 MMHG | BODY MASS INDEX: 17.89 KG/M2 | WEIGHT: 107.38 LBS | DIASTOLIC BLOOD PRESSURE: 70 MMHG | HEIGHT: 65 IN

## 2019-07-03 DIAGNOSIS — N30.10 IC (INTERSTITIAL CYSTITIS): Primary | ICD-10-CM

## 2019-07-03 DIAGNOSIS — N94.10 DYSPAREUNIA, FEMALE: ICD-10-CM

## 2019-07-03 DIAGNOSIS — M62.89 PELVIC FLOOR TENSION: ICD-10-CM

## 2019-07-03 DIAGNOSIS — N95.1 MENOPAUSAL SYMPTOMS: ICD-10-CM

## 2019-07-03 PROCEDURE — 99999 PR PBB SHADOW E&M-EST. PATIENT-LVL III: ICD-10-PCS | Mod: PBBFAC,,, | Performed by: NURSE PRACTITIONER

## 2019-07-03 PROCEDURE — 3008F BODY MASS INDEX DOCD: CPT | Mod: CPTII,S$GLB,, | Performed by: NURSE PRACTITIONER

## 2019-07-03 PROCEDURE — 99999 PR PBB SHADOW E&M-EST. PATIENT-LVL III: CPT | Mod: PBBFAC,,, | Performed by: NURSE PRACTITIONER

## 2019-07-03 PROCEDURE — 99214 PR OFFICE/OUTPT VISIT, EST, LEVL IV, 30-39 MIN: ICD-10-PCS | Mod: S$GLB,,, | Performed by: NURSE PRACTITIONER

## 2019-07-03 PROCEDURE — 99214 OFFICE O/P EST MOD 30 MIN: CPT | Mod: S$GLB,,, | Performed by: NURSE PRACTITIONER

## 2019-07-03 PROCEDURE — 3008F PR BODY MASS INDEX (BMI) DOCUMENTED: ICD-10-PCS | Mod: CPTII,S$GLB,, | Performed by: NURSE PRACTITIONER

## 2019-07-03 RX ORDER — ALPRAZOLAM 0.25 MG/1
TABLET ORAL
Refills: 2 | COMMUNITY
Start: 2019-05-02 | End: 2021-01-29

## 2019-07-03 NOTE — PROGRESS NOTES
07/03/2019    SUBJECTIVE:   54 y.o. female had recent IC flare.  Having pelvic pain after exercising.  Has episodes of abdominal swelling/ bladder pain -- seems cyclical.    History of IC  --Taking aloe vera leaf juice extract pills  --taking uribel three times daily  --no longer taking D-mannose  --no longer taking myrbetriq  --still doing pelvic floor PT home exercise program  --started NAN BLADDER BUILDER-- VEGETABLE PROPRIETARY BLEND  --did complete clinical trail -- it was stopped in phase III at [facility] with Dr. Finch    12/8/14  Title of Operation:  1) Total laparoscopic hysterectomy  2) Bilateral laparoscopic salpingectomy  3) Bilateral laparoscopic uterosacral suspension  4) Cystourethroscopy  5) Posterior repair with perineorrhaphy  6) Intraoperative radiology films for incorrect count: positive for needle in pelvis, removed laparoscopically, repeat film negative and count correct.      Indications for Surgery:  1) UI: (+) UVALDO only if bladder really full and sneezes, not with exercise. UVALDO not daily. (--) pads, no underwear changes. Daytime frequency: Q 2 hours. Nocturia: Yes: 1/night, around 4 AM. (--) dysuria, (--) hematuria, (--) frequent UTIs. Has had 1-2 mild UTIs. (+) complete bladder emptying.  --UF prolonged but normal otherwise. PF incomplete void until catheters removed. Compliance normal. Max capacity >400 mL. DO (--). UVALDO (--).   2) POP: Absent. (--) vaginal bleeding. +nl menses on OCP. (--) vaginal discharge. (+) sexually active. (--) dyspareunia. (+) Vaginal dryness. (--) vaginal estrogen use.   --POP-Q: Aa -1; Ba -1; C -6; Ap 0; Bp 0 (very distal rectocele); D -9. Genital hiatus 3, perineal body 3, total vaginal length 10.  3) BM: (+) constipation/straining. (--) chronic diarrhea. (--) hematochezia. (--) fecal incontinence. (--) fecal smearing/urgency. (+) incomplete evacuation. No splinting.   4) ? IC: Dx last year per Dr. Labadie at [facility]. No cysto. Was counseled to  eliminate exacerbating foods--did seem to help. Seems worse after on feet all day or with stress. Sx: frequency, urgency, feels some burning at the end of void, difficult evacuation. +relief with emptying. Still + menses--on OCP due to frequent menses. No correlation with menses. May have some flare with intercourse. Pain with flare: 6/10 only with urination, usually lasts x several weeks but gradually improves. Flares occur Q 2-3 months. Does have some anxiety. No previous treatment.   5) Patient desires ovarian preservation.      Preoperative Diagnosis:  1) Cystocele  2) Rectocele  3) Dysfunctional uterine bleeding  4) Urinary urgency/frequency  5) Concern for interstitial cystitis  6) Chronic constipation  7) Defecatory dysfunction  8) Mixed urinary incontinence     Postoperative Diagnosis:  1) Cystocele  2) Rectocele  3) Dysfunctional uterine bleeding  4) Urinary urgency/frequency  5) Concern for interstitial cystitis  6) Chronic constipation  7) Defecatory dysfunction  8) Mixed urinary incontinence       Past Medical History:   Diagnosis Date    Urinary tract infection     Uterine fibroid        Past Surgical History:   Procedure Laterality Date    CYSTOSCOPY N/A 12/8/2014    [facility]    HYSTERECTOMY      2014    HYSTERECTOMY-TOTAL LAPAROSCOPIC (TLH) N/A 12/8/2014    [facility]    LAPAROSCOPIC - UTEROSACRAL SUSPENSION  12/8/2014    [facility]    LAPAROSCOPY N/A 12/8/2014    [facility]    PELVIC LAPAROSCOPY      uterine septum repair    REPAIR-POSTERIOR N/A 12/8/2014    [facility]    RHINOPLASTY TIP  1980    SALPINGECTOMY-LAPAROSCOPIC Bilateral 12/8/2014    [facility]    TUBAL LIGATION  December 1999    Postpartum    umbilical cyst  2004       Current  "Outpatient Medications   Medication Sig Dispense Refill    ALOE VERA ORAL Take by mouth.      fish oil-omega-3 fatty acids 300-1,000 mg capsule Take 2 g by mouth once daily.      ibuprofen (ADVIL,MOTRIN) 600 MG tablet Take 1 tablet (600 mg total) by mouth 3 (three) times daily. 30 tablet 0    UNABLE TO FIND medication name: Bladder Builder      UNABLE TO FIND medication name: Calm      URIBEL 118-10-40.8-36 mg Cap TAKE 1 CAPSULE BY MOUTH FOUR TIMES DAILY AS NEEDED 120 capsule 0    ALPRAZolam (XANAX) 0.25 MG tablet TK 1 T PO QHS PRF INSOMNIA  2    diphenhydrAMINE-aluminum-magnesium hydroxide-simethicone-lidocaine HCl 2% Swish and spit 15 mLs every 4 (four) hours as needed. 180 mL 0    MYRBETRIQ 50 mg Tb24       ospemifene (OSPHENA) 60 mg Tab Take 60 mg by mouth once daily. 90 tablet 3     No current facility-administered medications for this visit.      Allergies: Patient has no known allergies.   Patient's last menstrual period was 11/30/2014.      Well Woman:  Pap:02/2013 normal- patient is post hyst- no further screening needed  Mammo:09/2018- normal  Colonoscopy:07/29/2016 normal- repeat in 10 years  Dexa:2014 normal per patient report      Family History  Family History   Problem Relation Age of Onset    Breast cancer Maternal Cousin     Ovarian cancer Maternal Cousin     Colon cancer Neg Hx     Diabetes Neg Hx     Hypertension Neg Hx     Stroke Neg Hx     Cervical cancer Neg Hx     Endometrial cancer Neg Hx     Vaginal cancer Neg Hx           OBJECTIVE:   The patient appears well, alert, oriented x 3, in no distress.  /70 (BP Location: Right arm, Patient Position: Sitting, BP Method: Small (Manual))   Ht 5' 5" (1.651 m)   Wt 48.7 kg (107 lb 5.8 oz)   LMP 11/30/2014   BMI 17.87 kg/m²    Abdomen soft without tenderness, guarding, mass or organomegaly. No CVA tenderness  Extremities show no edema, normal peripheral pulses.   Neurological is normal, no focal findings.    PELVIC EXAM: "   VULVA: normal appearing vulva with no masses, tenderness or lesions,   VAGINA: normal appearing vagina with normal color and discharge, no lesions, atrophic,  CERVIX: surgically absent,   UTERUS: absent,   ADNEXA: no masses  RECTAL: normal rectal, no masses    ASSESSMENT:   1. IC (interstitial cystitis)  Ambulatory consult to Physical Therapy   2. Pelvic floor tension  Ambulatory consult to Physical Therapy   3. Dyspareunia, female  ospemifene (OSPHENA) 60 mg Tab   4. Menopausal symptoms  Follicle stimulating hormone       PLAN:     1)  IC  --Empty bladder every 3 hours.  Empty well: wait a minute, lean forward on toilet.    --Avoid dietary irritants (see sheet).  Keep diary x 3-5 days to determine your irritants.    --URGE: oxybutynin, trospium cause constipation. Uses uribel intermittently.  Use uribel post coital and any time you are having urgency.  --Recommend Prelief to help alkinize the urine:              --Prelief Tablets : Each tablet contains 345 mg calcium glycerophosphate (65 mg of elemental calcium). The tablets also contain 0.25% magnesium            stearate as a processing aid. Two tablets are equivalent to 690 mg calcium glycerophosphate (130mg of elemental calcium).              --Prelief Powder : Each ¼ teaspoon usage of powder is comparable to two tablets. The powder dissolves rapidly in food or non-alcoholic beverages.             Tablets are recommended for taking with alcoholic beverages              --Usage:                           --Tablets: 2-3 tablets, 2 times a day.                          --Powder: ¼ teaspoon of powder 2 times a day. More can be used when needed  --Hydrodistention With Cystoscopy: allows physicians to take a much closer look at the bladder wall. While the AUA no longer suggests that it be used as a diagnostic method (unless a diagnosis is in doubt), hydrodistention may provide modest relief in IC symptoms which can last from three to six months.   --continue  aloe  vera        2)Vaginal atrophy (dryness):   --trial of osphena     3)Constipation controlled- continue fiber  Constipation:  Controlling constipation may help bladder urgency/leakage and fiber may better control cholesterol and blood glucose.  Start daily fiber.  Take 1 tsp of fiber powder (psyllium or other sugar-free powder).  Mix in 8 oz of water.  Take x 3-5 days.  Then, increase fiber by 1 tsp every 3-5 days until stool is easy to pass.  Stop and continue at that dose.   Do not exceed 6 tsps/day.  May also use over the counter stool softener 1-2 x/day.  AVOID laxatives.     4)pelvic floor tension  --start PT with Radha Jack ([facility] [place]): (p) [phone].  (f) [phone].    5)RTC 6 months      30 minutes were spent in face to face time with this patient  90 % of this time was spent in counseling and/or coordination of care  Leona MORELAND [facility]  Division of Female Pelvic Medicine and Reconstructive Surgery  Department of Obstetrics & Gynecology

## 2019-07-03 NOTE — PATIENT INSTRUCTIONS
1)  IC  --Empty bladder every 3 hours.  Empty well: wait a minute, lean forward on toilet.    --Avoid dietary irritants (see sheet).  Keep diary x 3-5 days to determine your irritants.    --URGE: oxybutynin, trospium cause constipation. Uses uribel intermittently.  Use uribel post coital and any time you are having urgency.  --Recommend Prelief to help alkinize the urine:              --Prelief Tablets : Each tablet contains 345 mg calcium glycerophosphate (65 mg of elemental calcium). The tablets also contain 0.25% magnesium            stearate as a processing aid. Two tablets are equivalent to 690 mg calcium glycerophosphate (130mg of elemental calcium).              --Prelief Powder : Each ¼ teaspoon usage of powder is comparable to two tablets. The powder dissolves rapidly in food or non-alcoholic beverages.             Tablets are recommended for taking with alcoholic beverages              --Usage:                           --Tablets: 2-3 tablets, 2 times a day.                          --Powder: ¼ teaspoon of powder 2 times a day. More can be used when needed  --Hydrodistention With Cystoscopy: allows physicians to take a much closer look at the bladder wall. While the AUA no longer suggests that it be used as a diagnostic method (unless a diagnosis is in doubt), hydrodistention may provide modest relief in IC symptoms which can last from three to six months.   --continue  aloe vera        2)Vaginal atrophy (dryness):   --trial of osphena     3)Constipation controlled- continue fiber  Constipation:  Controlling constipation may help bladder urgency/leakage and fiber may better control cholesterol and blood glucose.  Start daily fiber.  Take 1 tsp of fiber powder (psyllium or other sugar-free powder).  Mix in 8 oz of water.  Take x 3-5 days.  Then, increase fiber by 1 tsp every 3-5 days until stool is easy to pass.  Stop and continue at that dose.   Do not exceed 6 tsps/day.  May also use over the counter  stool softener 1-2 x/day.  AVOID laxatives.     4)pelvic floor tension  --start PT with Radha Jack ([facility]): (p) [phone].  (f) [phone].    5)RTC 6 months

## 2019-07-11 ENCOUNTER — PATIENT MESSAGE (OUTPATIENT)
Dept: UROGYNECOLOGY | Facility: CLINIC | Age: 54
End: 2019-07-11

## 2019-07-11 NOTE — TELEPHONE ENCOUNTER
Charli BARROS  2 things,  1) I am having a flare up and was wondering if I could try using the muscle relaxer vaginal suppositories that you had prescribed once before. Last time [facility] on the [facility] is where I purchased it.  2) I hadn't received a call from the mail order Pharmacy regarding the Osphena, so I called them,  they don't have a prescription for me yet.    Thanks  Tara Unger.

## 2019-07-30 ENCOUNTER — TELEPHONE (OUTPATIENT)
Dept: UROGYNECOLOGY | Facility: CLINIC | Age: 54
End: 2019-07-30

## 2019-07-30 ENCOUNTER — PATIENT MESSAGE (OUTPATIENT)
Dept: UROGYNECOLOGY | Facility: CLINIC | Age: 54
End: 2019-07-30

## 2019-07-30 NOTE — TELEPHONE ENCOUNTER
Rx Lidociane 2%, valium 5 mg, baclofen 4% suppository called in to [facility] [phone] ,Orestes hebert. Per DEYA Broussard.

## 2019-09-08 RX ORDER — METHENAMINE, SODIUM PHOSPHATE, MONOBASIC, MONOHYDRATE, PHENYL SALICYLATE, METHYLENE BLUE, AND HYOSCYAMINE SULFATE 118; 40.8; 36; 10; .12 MG/1; MG/1; MG/1; MG/1; MG/1
CAPSULE ORAL
Qty: 120 CAPSULE | Refills: 0 | Status: SHIPPED | OUTPATIENT
Start: 2019-09-08 | End: 2019-10-27 | Stop reason: SDUPTHER

## 2019-09-30 ENCOUNTER — TELEPHONE (OUTPATIENT)
Dept: UROGYNECOLOGY | Facility: CLINIC | Age: 54
End: 2019-09-30

## 2019-09-30 NOTE — TELEPHONE ENCOUNTER
Returned pt call no answer, Left voice message for pt to give the office a call back at [phone].

## 2019-09-30 NOTE — TELEPHONE ENCOUNTER
----- Message from Valerie Valerio sent at 9/30/2019  9:37 AM CDT -----  Contact: LOTTIE ESCUDERO [[id number]]  Name of Who is Calling:  LOTTIE ESCUDERO [[id number]]     What is the request in detail:Patient is requesting a call back .. Patient didn't state reason ...  Please contact to further discuss and advise      Can the clinic reply by [facility]: no     What Number to Call Back if not in [facility]:  [phone]

## 2019-10-28 RX ORDER — METHENAMINE, SODIUM PHOSPHATE, MONOBASIC, MONOHYDRATE, PHENYL SALICYLATE, METHYLENE BLUE, AND HYOSCYAMINE SULFATE 118; 40.8; 36; 10; .12 MG/1; MG/1; MG/1; MG/1; MG/1
CAPSULE ORAL
Qty: 120 CAPSULE | Refills: 0 | Status: SHIPPED | OUTPATIENT
Start: 2019-10-28 | End: 2019-12-16 | Stop reason: SDUPTHER

## 2019-12-16 RX ORDER — METHENAMINE, SODIUM PHOSPHATE, MONOBASIC, MONOHYDRATE, PHENYL SALICYLATE, METHYLENE BLUE, AND HYOSCYAMINE SULFATE 118; 40.8; 36; 10; .12 MG/1; MG/1; MG/1; MG/1; MG/1
CAPSULE ORAL
Qty: 120 CAPSULE | Refills: 3 | Status: SHIPPED | OUTPATIENT
Start: 2019-12-16 | End: 2020-07-31

## 2020-01-10 DIAGNOSIS — Z12.31 ENCOUNTER FOR SCREENING MAMMOGRAM FOR BREAST CANCER: Primary | ICD-10-CM

## 2020-01-16 ENCOUNTER — HOSPITAL ENCOUNTER (OUTPATIENT)
Dept: RADIOLOGY | Facility: HOSPITAL | Age: 55
Discharge: HOME | End: 2020-01-16
Attending: NURSE PRACTITIONER
Payer: COMMERCIAL

## 2020-01-16 VITALS — HEIGHT: 65 IN | WEIGHT: 107 LBS | BODY MASS INDEX: 17.83 KG/M2

## 2020-01-16 DIAGNOSIS — Z12.31 ENCOUNTER FOR SCREENING MAMMOGRAM FOR BREAST CANCER: ICD-10-CM

## 2020-01-16 PROCEDURE — 77063 BREAST TOMOSYNTHESIS BI: CPT | Mod: 26,,, | Performed by: RADIOLOGY

## 2020-01-16 PROCEDURE — 77063 MAMMO DIGITAL SCREENING BILAT WITH TOMOSYNTHESIS_CAD: ICD-10-PCS | Mod: 26,,, | Performed by: RADIOLOGY

## 2020-01-16 PROCEDURE — 77067 SCR MAMMO BI INCL CAD: CPT | Mod: 26,,, | Performed by: RADIOLOGY

## 2020-01-16 PROCEDURE — 77067 SCR MAMMO BI INCL CAD: CPT | Mod: TC

## 2020-01-16 PROCEDURE — 77067 MAMMO DIGITAL SCREENING BILAT WITH TOMOSYNTHESIS_CAD: ICD-10-PCS | Mod: 26,,, | Performed by: RADIOLOGY

## 2020-01-17 ENCOUNTER — PATIENT MESSAGE (OUTPATIENT)
Dept: UROGYNECOLOGY | Facility: CLINIC | Age: 55
End: 2020-01-17

## 2020-02-06 ENCOUNTER — OFFICE VISIT (OUTPATIENT)
Dept: UROGYNECOLOGY | Facility: CLINIC | Age: 55
End: 2020-02-06
Payer: COMMERCIAL

## 2020-02-06 VITALS
SYSTOLIC BLOOD PRESSURE: 110 MMHG | DIASTOLIC BLOOD PRESSURE: 60 MMHG | WEIGHT: 107.13 LBS | HEIGHT: 65 IN | BODY MASS INDEX: 17.85 KG/M2

## 2020-02-06 DIAGNOSIS — Z01.419 WELL WOMAN EXAM: Primary | ICD-10-CM

## 2020-02-06 DIAGNOSIS — N30.10 IC (INTERSTITIAL CYSTITIS): ICD-10-CM

## 2020-02-06 DIAGNOSIS — Z87.19 HX OF CONSTIPATION: ICD-10-CM

## 2020-02-06 PROCEDURE — 99999 PR PBB SHADOW E&M-EST. PATIENT-LVL III: CPT | Mod: PBBFAC,,, | Performed by: NURSE PRACTITIONER

## 2020-02-06 PROCEDURE — 99999 PR PBB SHADOW E&M-EST. PATIENT-LVL III: ICD-10-PCS | Mod: PBBFAC,,, | Performed by: NURSE PRACTITIONER

## 2020-02-06 PROCEDURE — 99396 PR PREVENTIVE VISIT,EST,40-64: ICD-10-PCS | Mod: S$GLB,,, | Performed by: NURSE PRACTITIONER

## 2020-02-06 PROCEDURE — 99396 PREV VISIT EST AGE 40-64: CPT | Mod: S$GLB,,, | Performed by: NURSE PRACTITIONER

## 2020-02-06 RX ORDER — FLUTICASONE PROPIONATE 50 MCG
2 SPRAY, SUSPENSION (ML) NASAL
COMMUNITY
Start: 2019-12-13 | End: 2020-12-12

## 2020-02-06 NOTE — PROGRESS NOTES
02/06/2020    SUBJECTIVE:   54 y.o. female for annual exam    History of IC  --taking uribel 2-3 times daily  --still going to pelvic floor PT  --No longer taking aloe vera leaf juice extract pills  --no longer taking D-mannose  --no longer taking myrbetriq    12/8/14  Title of Operation:  1) Total laparoscopic hysterectomy  2) Bilateral laparoscopic salpingectomy  3) Bilateral laparoscopic uterosacral suspension  4) Cystourethroscopy  5) Posterior repair with perineorrhaphy  6) Intraoperative radiology films for incorrect count: positive for needle in pelvis, removed laparoscopically, repeat film negative and count correct.      Indications for Surgery:  1) UI: (+) UVALDO only if bladder really full and sneezes, not with exercise. UVALDO not daily. (--) pads, no underwear changes. Daytime frequency: Q 2 hours. Nocturia: Yes: 1/night, around 4 AM. (--) dysuria, (--) hematuria, (--) frequent UTIs. Has had 1-2 mild UTIs. (+) complete bladder emptying.  --UF prolonged but normal otherwise. PF incomplete void until catheters removed. Compliance normal. Max capacity >400 mL. DO (--). UVALDO (--).   2) POP: Absent. (--) vaginal bleeding. +nl menses on OCP. (--) vaginal discharge. (+) sexually active. (--) dyspareunia. (+) Vaginal dryness. (--) vaginal estrogen use.   --POP-Q: Aa -1; Ba -1; C -6; Ap 0; Bp 0 (very distal rectocele); D -9. Genital hiatus 3, perineal body 3, total vaginal length 10.  3) BM: (+) constipation/straining. (--) chronic diarrhea. (--) hematochezia. (--) fecal incontinence. (--) fecal smearing/urgency. (+) incomplete evacuation. No splinting.   4) ? IC: Dx last year per Dr. Labadie at [facility]. No cysto. Was counseled to eliminate exacerbating foods--did seem to help. Seems worse after on feet all day or with stress. Sx: frequency, urgency, feels some burning at the end of void, difficult evacuation. +relief with emptying. Still + menses--on OCP due to frequent menses. No correlation with menses. May have some  flare with intercourse. Pain with flare: 6/10 only with urination, usually lasts x several weeks but gradually improves. Flares occur Q 2-3 months. Does have some anxiety. No previous treatment.   5) Patient desires ovarian preservation.      Preoperative Diagnosis:  1) Cystocele  2) Rectocele  3) Dysfunctional uterine bleeding  4) Urinary urgency/frequency  5) Concern for interstitial cystitis  6) Chronic constipation  7) Defecatory dysfunction  8) Mixed urinary incontinence     Postoperative Diagnosis:  1) Cystocele  2) Rectocele  3) Dysfunctional uterine bleeding  4) Urinary urgency/frequency  5) Concern for interstitial cystitis  6) Chronic constipation  7) Defecatory dysfunction  8) Mixed urinary incontinence       Past Medical History:   Diagnosis Date    Urinary tract infection     Uterine fibroid        Past Surgical History:   Procedure Laterality Date    HYSTERECTOMY      2014    PELVIC LAPAROSCOPY      uterine septum repair    RHINOPLASTY TIP  1980    TUBAL LIGATION  December 1999    Postpartum    umbilical cyst  2004       Current Outpatient Medications   Medication Sig Dispense Refill    fish oil-omega-3 fatty acids 300-1,000 mg capsule Take 2 g by mouth once daily.      fluticasone propionate (FLONASE) 50 mcg/actuation nasal spray 2 sprays by Nasal route.      UNABLE TO FIND medication name: Calm      URIBEL 118-10-40.8-36 mg Cap TAKE 1 CAPSULE BY MOUTH FOUR TIMES DAILY AS NEEDED 120 capsule 3    ALOE VERA ORAL Take by mouth.      ALPRAZolam (XANAX) 0.25 MG tablet TK 1 T PO QHS PRF INSOMNIA  2    diphenhydrAMINE-aluminum-magnesium hydroxide-simethicone-lidocaine HCl 2% Swish and spit 15 mLs every 4 (four) hours as needed. (Patient not taking: Reported on 2/6/2020) 180 mL 0    ibuprofen (ADVIL,MOTRIN) 600 MG tablet Take 1 tablet (600 mg total) by mouth 3 (three) times daily. (Patient not taking: Reported on 2/6/2020) 30 tablet 0    MYRBETRIQ 50 mg Tb24       ospemifene (OSPHENA) 60 mg  "Tab Take 60 mg by mouth once daily. (Patient not taking: Reported on 2/6/2020) 90 tablet 3    ospemifene (OSPHENA) 60 mg Tab Take 60 mg by mouth once daily. (Patient not taking: Reported on 2/6/2020) 90 tablet 4    UNABLE TO FIND medication name: Bladder Builder       No current facility-administered medications for this visit.      Allergies: Patient has no known allergies.   Patient's last menstrual period was 11/30/2014.      Well Woman:  Pap:02/2013 normal- patient is post hyst- no further screening needed  Mammo:01/2020- normal  Colonoscopy:07/29/2016 normal- repeat in 10 years  Dexa:2014 normal per patient report      Family History  Family History   Problem Relation Age of Onset    Breast cancer Maternal Cousin     Ovarian cancer Maternal Cousin     Colon cancer Neg Hx     Diabetes Neg Hx     Hypertension Neg Hx     Stroke Neg Hx     Cervical cancer Neg Hx     Endometrial cancer Neg Hx     Vaginal cancer Neg Hx           OBJECTIVE:   The patient appears well, alert, oriented x 3, in no distress.  /60 (BP Location: Right arm, Patient Position: Sitting, BP Method: Small (Manual))   Ht 5' 5" (1.651 m)   Wt 48.6 kg (107 lb 2.3 oz)   LMP 11/30/2014   BMI 17.83 kg/m²    ENT normal.  Neck supple. No adenopathy or thyromegaly. FINA.   Lungs are clear, good air entry, no wheezes, rhonchi or rales.   S1 and S2 normal, no murmurs, regular rate and rhythm.   Abdomen soft without tenderness, guarding, mass or organomegaly.   Extremities show no edema, normal peripheral pulses.   Neurological is normal, no focal findings.    PELVIC EXAM:   VULVA: normal appearing vulva with no masses, tenderness or lesions,   VAGINA: normal appearing vagina with normal color and discharge, no lesions, atrophic,  CERVIX: surgically absent,   UTERUS: absent,   ADNEXA: no masses  RECTAL: normal rectal, no masses    ASSESSMENT:   1. Well woman exam     2. IC (interstitial cystitis)     3. Hx of constipation         PLAN: "     1)  IC  --Empty bladder every 3 hours.  Empty well: wait a minute, lean forward on toilet.    --Avoid dietary irritants (see sheet).  Keep diary x 3-5 days to determine your irritants.    --URGE: oxybutynin, trospium causde constipation. Uses uribel intermittently.  Use uribel post coital and any time you are having urgency.  --Recommend Prelief to help alkinize the urine:              --Prelief Tablets : Each tablet contains 345 mg calcium glycerophosphate (65 mg of elemental calcium). The tablets also contain 0.25% magnesium            stearate as a processing aid. Two tablets are equivalent to 690 mg calcium glycerophosphate (130mg of elemental calcium).              --Prelief Powder : Each ¼ teaspoon usage of powder is comparable to two tablets. The powder dissolves rapidly in food or non-alcoholic beverages.             Tablets are recommended for taking with alcoholic beverages              --Usage:                           --Tablets: 2-3 tablets, 2 times a day.                          --Powder: ¼ teaspoon of powder 2 times a day. More can be used when needed  --Hydrodistention With Cystoscopy: allows physicians to take a much closer look at the bladder wall. While the AUA no longer suggests that it be used as a diagnostic method (unless a diagnosis is in doubt), hydrodistention may provide modest relief in IC symptoms which can last from three to six months.   --continue  align        2)Vaginal atrophy (dryness):   --no longer using osphena  -- Use REPLENS or REFRESH OTC: 1/2 applicator full in vagina twice a week.         3)Constipation controlled- continue fiber     4)pelvic floor tension  --continue pelvic floor PT home exercise program    5)RTC1 year for annual    30 minutes were spent in face to face time with this patient  90 % of this time was spent in counseling and/or coordination of care  Leona MORELAND [facility]  Division of Female Pelvic Medicine and Reconstructive  Surgery  Department of Obstetrics & Gynecology

## 2020-02-06 NOTE — PATIENT INSTRUCTIONS
1)  IC  --Empty bladder every 3 hours.  Empty well: wait a minute, lean forward on toilet.    --Avoid dietary irritants (see sheet).  Keep diary x 3-5 days to determine your irritants.    --URGE: oxybutynin, trospium causde constipation. Uses uribel intermittently.  Use uribel post coital and any time you are having urgency.  --Recommend Prelief to help alkinize the urine:              --Prelief Tablets : Each tablet contains 345 mg calcium glycerophosphate (65 mg of elemental calcium). The tablets also contain 0.25% magnesium            stearate as a processing aid. Two tablets are equivalent to 690 mg calcium glycerophosphate (130mg of elemental calcium).              --Prelief Powder : Each ¼ teaspoon usage of powder is comparable to two tablets. The powder dissolves rapidly in food or non-alcoholic beverages.             Tablets are recommended for taking with alcoholic beverages              --Usage:                           --Tablets: 2-3 tablets, 2 times a day.                          --Powder: ¼ teaspoon of powder 2 times a day. More can be used when needed  --Hydrodistention With Cystoscopy: allows physicians to take a much closer look at the bladder wall. While the AUA no longer suggests that it be used as a diagnostic method (unless a diagnosis is in doubt), hydrodistention may provide modest relief in IC symptoms which can last from three to six months.   --continue  align        2)Vaginal atrophy (dryness):   --no longer using osphena  -- Use REPLENS or REFRESH OTC: 1/2 applicator full in vagina twice a week.         3)Constipation controlled- continue fiber     4)pelvic floor tension  --continue pelvic floor PT home exercise program    5)RTC1 year for annual

## 2020-11-20 ENCOUNTER — TELEPHONE (OUTPATIENT)
Dept: UROGYNECOLOGY | Facility: CLINIC | Age: 55
End: 2020-11-20

## 2020-11-20 ENCOUNTER — LAB VISIT (OUTPATIENT)
Dept: LAB | Facility: HOSPITAL | Age: 55
End: 2020-11-20
Attending: NURSE PRACTITIONER
Payer: COMMERCIAL

## 2020-11-20 DIAGNOSIS — R39.15 URINARY URGENCY: Primary | ICD-10-CM

## 2020-11-20 DIAGNOSIS — R39.15 URINARY URGENCY: ICD-10-CM

## 2020-11-20 LAB
BACTERIA #/AREA URNS AUTO: ABNORMAL /HPF
BILIRUB UR QL STRIP: NEGATIVE
CLARITY UR REFRACT.AUTO: ABNORMAL
COLOR UR AUTO: ABNORMAL
GLUCOSE UR QL STRIP: NEGATIVE
HGB UR QL STRIP: NEGATIVE
KETONES UR QL STRIP: NEGATIVE
LEUKOCYTE ESTERASE UR QL STRIP: ABNORMAL
MICROSCOPIC COMMENT: ABNORMAL
NITRITE UR QL STRIP: NEGATIVE
PH UR STRIP: 5 [PH] (ref 5–8)
PROT UR QL STRIP: NEGATIVE
RBC #/AREA URNS AUTO: 2 /HPF (ref 0–4)
SP GR UR STRIP: 1.02 (ref 1–1.03)
SQUAMOUS #/AREA URNS AUTO: 1 /HPF
URN SPEC COLLECT METH UR: ABNORMAL
WBC #/AREA URNS AUTO: 75 /HPF (ref 0–5)

## 2020-11-20 PROCEDURE — 81001 URINALYSIS AUTO W/SCOPE: CPT

## 2020-11-20 PROCEDURE — 87086 URINE CULTURE/COLONY COUNT: CPT

## 2020-11-20 RX ORDER — NITROFURANTOIN 25; 75 MG/1; MG/1
100 CAPSULE ORAL 2 TIMES DAILY
Qty: 10 CAPSULE | Refills: 0 | Status: SHIPPED | OUTPATIENT
Start: 2020-11-20 | End: 2021-01-29 | Stop reason: ALTCHOICE

## 2020-11-20 NOTE — TELEPHONE ENCOUNTER
----- Message from Vicki Garay sent at 11/20/2020  9:24 AM CST -----  Pt is calling in regards to a bladder infection please contact her at [phone]         Cheryl GUEVARA

## 2020-11-20 NOTE — TELEPHONE ENCOUNTER
Pt is requesting a urine culture order due to her doing a home test and it came back positive for infection as well as having symptoms. Informed pt I've placed a UA/UC order so that she can drop off a urine specimen at any [facility] lab closets to her. Once results is in she'll be notified.

## 2020-11-22 LAB — BACTERIA UR CULT: NO GROWTH

## 2020-11-23 ENCOUNTER — PATIENT MESSAGE (OUTPATIENT)
Dept: UROGYNECOLOGY | Facility: CLINIC | Age: 55
End: 2020-11-23

## 2020-12-23 ENCOUNTER — OFFICE VISIT (OUTPATIENT)
Dept: URGENT CARE | Facility: CLINIC | Age: 55
End: 2020-12-23
Payer: COMMERCIAL

## 2020-12-23 VITALS
BODY MASS INDEX: 18.33 KG/M2 | TEMPERATURE: 97 F | SYSTOLIC BLOOD PRESSURE: 122 MMHG | DIASTOLIC BLOOD PRESSURE: 74 MMHG | HEIGHT: 65 IN | OXYGEN SATURATION: 100 % | WEIGHT: 110 LBS | HEART RATE: 73 BPM

## 2020-12-23 DIAGNOSIS — R52 BODY ACHES: ICD-10-CM

## 2020-12-23 DIAGNOSIS — Z20.822 EXPOSURE TO COVID-19 VIRUS: Primary | ICD-10-CM

## 2020-12-23 LAB
CTP QC/QA: YES
CTP QC/QA: YES
FLUAV AG NPH QL: NEGATIVE
FLUBV AG NPH QL: NEGATIVE
SARS-COV-2 RDRP RESP QL NAA+PROBE: NEGATIVE

## 2020-12-23 PROCEDURE — U0002 COVID-19 LAB TEST NON-CDC: HCPCS | Mod: QW,S$GLB,, | Performed by: NURSE PRACTITIONER

## 2020-12-23 PROCEDURE — 87804 POCT INFLUENZA A/B: ICD-10-PCS | Mod: QW,S$GLB,, | Performed by: NURSE PRACTITIONER

## 2020-12-23 PROCEDURE — 99211 OFF/OP EST MAY X REQ PHY/QHP: CPT | Mod: 25,S$GLB,, | Performed by: NURSE PRACTITIONER

## 2020-12-23 PROCEDURE — 99211 PR OFFICE/OUTPT VISIT, EST, LEVL I: ICD-10-PCS | Mod: 25,S$GLB,, | Performed by: NURSE PRACTITIONER

## 2020-12-23 PROCEDURE — 87804 INFLUENZA ASSAY W/OPTIC: CPT | Mod: QW,S$GLB,, | Performed by: NURSE PRACTITIONER

## 2020-12-23 PROCEDURE — 3008F PR BODY MASS INDEX (BMI) DOCUMENTED: ICD-10-PCS | Mod: CPTII,S$GLB,, | Performed by: NURSE PRACTITIONER

## 2020-12-23 PROCEDURE — 3008F BODY MASS INDEX DOCD: CPT | Mod: CPTII,S$GLB,, | Performed by: NURSE PRACTITIONER

## 2020-12-23 PROCEDURE — U0002: ICD-10-PCS | Mod: QW,S$GLB,, | Performed by: NURSE PRACTITIONER

## 2020-12-24 NOTE — PROGRESS NOTES
"Subjective:       Patient ID: Tara Unger is a 55 y.o. female.    Vitals:  height is 5' 5" (1.651 m) and weight is 49.9 kg (110 lb). Her temperature is 96.5 °F (35.8 °C). Her blood pressure is 122/74 and her pulse is 73. Her oxygen saturation is 100%.     Chief Complaint: Sinus Problem    Pt stated that on yesterday she started to experience a runny nose, cough, sore throat , and body aches. She stated that her daughter recently tested positive with covid 19.  Provider note begins below  Patient states she was around her daughter on Sunday he tested positive on Monday.    Sinus Problem  This is a new problem. The current episode started yesterday. The problem is unchanged. There has been no fever. The pain is mild. Pertinent negatives include no chills, congestion, coughing, headaches, shortness of breath or sore throat. Past treatments include acetaminophen. The treatment provided moderate relief.       Constitution: Negative for chills, fatigue and fever.   HENT: Negative for congestion and sore throat.    Neck: Negative for painful lymph nodes.   Cardiovascular: Negative for chest pain and leg swelling.   Eyes: Negative for double vision and blurred vision.   Respiratory: Negative for cough and shortness of breath.    Gastrointestinal: Negative for nausea, vomiting and diarrhea.   Genitourinary: Negative for dysuria, frequency, urgency and history of kidney stones.   Musculoskeletal: Negative for joint pain, joint swelling, muscle cramps and muscle ache.   Skin: Negative for color change, pale, rash and bruising.   Allergic/Immunologic: Negative for seasonal allergies.   Neurological: Negative for dizziness, history of vertigo, light-headedness, passing out and headaches.   Hematologic/Lymphatic: Negative for swollen lymph nodes.   Psychiatric/Behavioral: Negative for nervous/anxious, sleep disturbance and depression. The patient is not nervous/anxious.        Objective:      Physical Exam   Constitutional: " "She is oriented to person, place, and time.   HENT:   Head: Normocephalic and atraumatic.   Cardiovascular: Normal rate.   Pulmonary/Chest: Effort normal. No respiratory distress.   Abdominal: Normal appearance.   Neurological: She is alert and oriented to person, place, and time.   Skin: Skin is warm and dry. Psychiatric: Her behavior is normal. Mood normal.         Results for orders placed or performed in visit on 12/23/20   POCT COVID-19 Rapid Screening   Result Value Ref Range    POC Rapid COVID Negative Negative     Acceptable Yes    POCT INFLUENZA A/B   Result Value Ref Range    Rapid Influenza A Ag Negative Negative    Rapid Influenza B Ag Negative Negative     Acceptable Yes      Assessment:       1. Exposure to COVID-19 virus    2. Body aches        Plan:       Discussed the need to quarantine due to recent exposure.    Patient Instructions   NEGATIVE COVID TEST  You have tested negative for COVID-19 today.  If you did not have a close exposure (as defined below) you can return to your normal daily activities to include social distancing, wearing a mask and frequent handwashing.  A "close exposure" is defined as anyone who has had an exposure (masked or unmasked) to a known COVID -19 positive person within 6 ft for longer than 15 minutes. If your exposure meets this definition, you are required by CDC guidelines to quarantine for at least 7-10 days from time of exposure.  The CDC states that a test can be performed for an asymptomatic patient (someone who does not have any symptoms) after a close exposure, and that a test should be done if you develop symptoms after a close exposure as described above.  Specifically, you can test at day 5 or later if asymptomatic in order to get released from quarantine on day 7 or later.  If you develop symptoms sooner, you should test when your symptoms start.  If you developed symptoms since the exposure, and your test was negative today " and less than 5 days from your exposure, you still have to quarantine for 7-10 days from the date of the exposure.  The 7-10 day quarantine begins from the day you were exposed, not the day of your test.  For example, if your exposure was on a Monday, and you waited until Friday of the same week to get tested and it was negative, your 7-10 day quarantine begins from that Monday, not the Friday you tested negative.  Please note, if you decide to test as an asymptomatic during your quarantine and you are positive, you will be restarting your quarantine and moving from a possible 10 day quarantine (if you do not test), to a 11 day or greater quarantine.      Exposure to COVID-19 virus  -     POCT COVID-19 Rapid Screening    Body aches  -     POCT INFLUENZA A/B

## 2021-01-21 ENCOUNTER — PATIENT MESSAGE (OUTPATIENT)
Dept: UROGYNECOLOGY | Facility: CLINIC | Age: 56
End: 2021-01-21

## 2021-01-22 ENCOUNTER — PROCEDURE VISIT (OUTPATIENT)
Dept: UROGYNECOLOGY | Facility: CLINIC | Age: 56
End: 2021-01-22
Payer: COMMERCIAL

## 2021-01-22 VITALS
DIASTOLIC BLOOD PRESSURE: 60 MMHG | HEIGHT: 65 IN | WEIGHT: 109.38 LBS | BODY MASS INDEX: 18.22 KG/M2 | SYSTOLIC BLOOD PRESSURE: 100 MMHG

## 2021-01-22 DIAGNOSIS — N95.2 VAGINAL ATROPHY: ICD-10-CM

## 2021-01-22 DIAGNOSIS — N30.10 IC (INTERSTITIAL CYSTITIS): Primary | ICD-10-CM

## 2021-01-22 PROCEDURE — 51700 IRRIGATION OF BLADDER: CPT | Mod: S$GLB,,, | Performed by: NURSE PRACTITIONER

## 2021-01-22 PROCEDURE — 51700 PR IRRIGATION, BLADDER: ICD-10-PCS | Mod: S$GLB,,, | Performed by: NURSE PRACTITIONER

## 2021-01-22 RX ORDER — HEPARIN SODIUM 1000 [USP'U]/ML
10000 INJECTION, SOLUTION INTRAVENOUS; SUBCUTANEOUS ONCE
Status: COMPLETED | OUTPATIENT
Start: 2021-01-22 | End: 2021-01-22

## 2021-01-22 RX ORDER — ESTRADIOL 10 UG/1
10 INSERT VAGINAL
Qty: 8 EACH | Refills: 11 | Status: SHIPPED | OUTPATIENT
Start: 2021-01-25 | End: 2021-01-29

## 2021-01-22 RX ORDER — TRIAMCINOLONE ACETONIDE 40 MG/ML
40 INJECTION, SUSPENSION INTRA-ARTICULAR; INTRAMUSCULAR ONCE
Status: COMPLETED | OUTPATIENT
Start: 2021-01-22 | End: 2021-01-22

## 2021-01-22 RX ADMIN — HEPARIN SODIUM 10000 UNITS: 1000 INJECTION, SOLUTION INTRAVENOUS; SUBCUTANEOUS at 12:01

## 2021-01-22 RX ADMIN — TRIAMCINOLONE ACETONIDE 40 MG: 40 INJECTION, SUSPENSION INTRA-ARTICULAR; INTRAMUSCULAR at 12:01

## 2021-01-29 ENCOUNTER — PROCEDURE VISIT (OUTPATIENT)
Dept: UROGYNECOLOGY | Facility: CLINIC | Age: 56
End: 2021-01-29
Payer: COMMERCIAL

## 2021-01-29 VITALS
SYSTOLIC BLOOD PRESSURE: 110 MMHG | DIASTOLIC BLOOD PRESSURE: 70 MMHG | HEIGHT: 65 IN | BODY MASS INDEX: 18.26 KG/M2 | WEIGHT: 109.56 LBS

## 2021-01-29 DIAGNOSIS — N30.10 IC (INTERSTITIAL CYSTITIS): Primary | ICD-10-CM

## 2021-01-29 DIAGNOSIS — N95.2 VAGINAL ATROPHY: ICD-10-CM

## 2021-01-29 PROCEDURE — 51700 PR IRRIGATION, BLADDER: ICD-10-PCS | Mod: S$GLB,,, | Performed by: NURSE PRACTITIONER

## 2021-01-29 PROCEDURE — 51700 IRRIGATION OF BLADDER: CPT | Mod: S$GLB,,, | Performed by: NURSE PRACTITIONER

## 2021-01-29 RX ORDER — BUPIVACAINE HYDROCHLORIDE 5 MG/ML
20 INJECTION, SOLUTION PERINEURAL
Status: COMPLETED | OUTPATIENT
Start: 2021-01-29 | End: 2021-01-29

## 2021-01-29 RX ORDER — HEPARIN SODIUM 1000 [USP'U]/ML
10000 INJECTION, SOLUTION INTRAVENOUS; SUBCUTANEOUS ONCE
Status: COMPLETED | OUTPATIENT
Start: 2021-01-29 | End: 2021-01-29

## 2021-01-29 RX ORDER — TRIAMCINOLONE ACETONIDE 40 MG/ML
40 INJECTION, SUSPENSION INTRA-ARTICULAR; INTRAMUSCULAR ONCE
Status: COMPLETED | OUTPATIENT
Start: 2021-01-29 | End: 2021-01-29

## 2021-01-29 RX ADMIN — HEPARIN SODIUM 10000 UNITS: 1000 INJECTION, SOLUTION INTRAVENOUS; SUBCUTANEOUS at 09:01

## 2021-01-29 RX ADMIN — TRIAMCINOLONE ACETONIDE 40 MG: 40 INJECTION, SUSPENSION INTRA-ARTICULAR; INTRAMUSCULAR at 09:01

## 2021-01-29 RX ADMIN — BUPIVACAINE HYDROCHLORIDE 100 MG: 5 INJECTION, SOLUTION PERINEURAL at 09:01

## 2021-02-04 ENCOUNTER — PROCEDURE VISIT (OUTPATIENT)
Dept: UROGYNECOLOGY | Facility: CLINIC | Age: 56
End: 2021-02-04
Payer: COMMERCIAL

## 2021-02-04 VITALS
SYSTOLIC BLOOD PRESSURE: 120 MMHG | BODY MASS INDEX: 18.33 KG/M2 | HEIGHT: 65 IN | DIASTOLIC BLOOD PRESSURE: 60 MMHG | WEIGHT: 110 LBS

## 2021-02-04 DIAGNOSIS — Z12.31 ENCOUNTER FOR SCREENING MAMMOGRAM FOR BREAST CANCER: ICD-10-CM

## 2021-02-04 DIAGNOSIS — N30.10 IC (INTERSTITIAL CYSTITIS): Primary | ICD-10-CM

## 2021-02-04 PROCEDURE — 51700 PR IRRIGATION, BLADDER: ICD-10-PCS | Mod: S$GLB,,, | Performed by: NURSE PRACTITIONER

## 2021-02-04 PROCEDURE — 51700 IRRIGATION OF BLADDER: CPT | Mod: S$GLB,,, | Performed by: NURSE PRACTITIONER

## 2021-02-04 RX ORDER — TRIAMCINOLONE ACETONIDE 40 MG/ML
40 INJECTION, SUSPENSION INTRA-ARTICULAR; INTRAMUSCULAR ONCE
Status: COMPLETED | OUTPATIENT
Start: 2021-02-04 | End: 2021-02-04

## 2021-02-04 RX ORDER — HEPARIN SODIUM 1000 [USP'U]/ML
10000 INJECTION, SOLUTION INTRAVENOUS; SUBCUTANEOUS ONCE
Status: COMPLETED | OUTPATIENT
Start: 2021-02-04 | End: 2021-02-04

## 2021-02-04 RX ORDER — BUPIVACAINE HYDROCHLORIDE 5 MG/ML
20 INJECTION, SOLUTION EPIDURAL; INTRACAUDAL
Status: COMPLETED | OUTPATIENT
Start: 2021-02-04 | End: 2021-02-04

## 2021-02-04 RX ADMIN — BUPIVACAINE HYDROCHLORIDE 100 MG: 5 INJECTION, SOLUTION EPIDURAL; INTRACAUDAL at 10:02

## 2021-02-04 RX ADMIN — HEPARIN SODIUM 10000 UNITS: 1000 INJECTION, SOLUTION INTRAVENOUS; SUBCUTANEOUS at 10:02

## 2021-02-04 RX ADMIN — TRIAMCINOLONE ACETONIDE 40 MG: 40 INJECTION, SUSPENSION INTRA-ARTICULAR; INTRAMUSCULAR at 10:02

## 2021-02-23 ENCOUNTER — HOSPITAL ENCOUNTER (OUTPATIENT)
Dept: RADIOLOGY | Facility: HOSPITAL | Age: 56
Discharge: HOME | End: 2021-02-23
Attending: NURSE PRACTITIONER
Payer: COMMERCIAL

## 2021-02-23 DIAGNOSIS — Z12.31 ENCOUNTER FOR SCREENING MAMMOGRAM FOR BREAST CANCER: ICD-10-CM

## 2021-02-23 PROCEDURE — 77067 MAMMO DIGITAL SCREENING BILAT WITH TOMO: ICD-10-PCS | Mod: 26,,, | Performed by: RADIOLOGY

## 2021-02-23 PROCEDURE — 77067 SCR MAMMO BI INCL CAD: CPT | Mod: TC,PO

## 2021-02-23 PROCEDURE — 77067 SCR MAMMO BI INCL CAD: CPT | Mod: 26,,, | Performed by: RADIOLOGY

## 2021-02-23 PROCEDURE — 77063 MAMMO DIGITAL SCREENING BILAT WITH TOMO: ICD-10-PCS | Mod: 26,,, | Performed by: RADIOLOGY

## 2021-02-23 PROCEDURE — 77063 BREAST TOMOSYNTHESIS BI: CPT | Mod: 26,,, | Performed by: RADIOLOGY

## 2021-02-24 ENCOUNTER — PATIENT MESSAGE (OUTPATIENT)
Dept: UROGYNECOLOGY | Facility: CLINIC | Age: 56
End: 2021-02-24

## 2021-03-03 RX ORDER — METHENAMINE, SODIUM PHOSPHATE, MONOBASIC, MONOHYDRATE, PHENYL SALICYLATE, METHYLENE BLUE, AND HYOSCYAMINE SULFATE 118; 40.8; 36; 10; .12 MG/1; MG/1; MG/1; MG/1; MG/1
1 CAPSULE ORAL 4 TIMES DAILY PRN
Qty: 120 CAPSULE | Refills: 3 | Status: SHIPPED | OUTPATIENT
Start: 2021-03-03 | End: 2021-09-27

## 2021-04-15 ENCOUNTER — PATIENT MESSAGE (OUTPATIENT)
Dept: RESEARCH | Facility: HOSPITAL | Age: 56
End: 2021-04-15

## 2021-05-13 ENCOUNTER — PATIENT MESSAGE (OUTPATIENT)
Dept: UROGYNECOLOGY | Facility: CLINIC | Age: 56
End: 2021-05-13

## 2021-05-14 ENCOUNTER — OFFICE VISIT (OUTPATIENT)
Dept: UROGYNECOLOGY | Facility: CLINIC | Age: 56
End: 2021-05-14
Payer: COMMERCIAL

## 2021-05-14 VITALS
HEIGHT: 65 IN | WEIGHT: 110.44 LBS | SYSTOLIC BLOOD PRESSURE: 100 MMHG | DIASTOLIC BLOOD PRESSURE: 70 MMHG | BODY MASS INDEX: 18.4 KG/M2

## 2021-05-14 DIAGNOSIS — N30.10 IC (INTERSTITIAL CYSTITIS): Primary | ICD-10-CM

## 2021-05-14 PROCEDURE — 99499 UNLISTED E&M SERVICE: CPT | Mod: S$GLB,,, | Performed by: NURSE PRACTITIONER

## 2021-05-14 PROCEDURE — 99499 NO LOS: ICD-10-PCS | Mod: S$GLB,,, | Performed by: NURSE PRACTITIONER

## 2021-05-14 PROCEDURE — 1125F PR PAIN SEVERITY QUANTIFIED, PAIN PRESENT: ICD-10-PCS | Mod: S$GLB,,, | Performed by: NURSE PRACTITIONER

## 2021-05-14 PROCEDURE — 99999 PR PBB SHADOW E&M-EST. PATIENT-LVL III: CPT | Mod: PBBFAC,,, | Performed by: NURSE PRACTITIONER

## 2021-05-14 PROCEDURE — 3008F PR BODY MASS INDEX (BMI) DOCUMENTED: ICD-10-PCS | Mod: CPTII,S$GLB,, | Performed by: NURSE PRACTITIONER

## 2021-05-14 PROCEDURE — 51700 PR IRRIGATION, BLADDER: ICD-10-PCS | Mod: S$GLB,,, | Performed by: NURSE PRACTITIONER

## 2021-05-14 PROCEDURE — 99999 PR PBB SHADOW E&M-EST. PATIENT-LVL III: ICD-10-PCS | Mod: PBBFAC,,, | Performed by: NURSE PRACTITIONER

## 2021-05-14 PROCEDURE — 3008F BODY MASS INDEX DOCD: CPT | Mod: CPTII,S$GLB,, | Performed by: NURSE PRACTITIONER

## 2021-05-14 PROCEDURE — 1125F AMNT PAIN NOTED PAIN PRSNT: CPT | Mod: S$GLB,,, | Performed by: NURSE PRACTITIONER

## 2021-05-14 PROCEDURE — 51700 IRRIGATION OF BLADDER: CPT | Mod: S$GLB,,, | Performed by: NURSE PRACTITIONER

## 2021-05-14 RX ORDER — TRIAMCINOLONE ACETONIDE 40 MG/ML
40 INJECTION, SUSPENSION INTRA-ARTICULAR; INTRAMUSCULAR ONCE
Status: COMPLETED | OUTPATIENT
Start: 2021-05-14 | End: 2021-05-14

## 2021-05-14 RX ORDER — HEPARIN SODIUM 1000 [USP'U]/ML
10000 INJECTION, SOLUTION INTRAVENOUS; SUBCUTANEOUS ONCE
Status: COMPLETED | OUTPATIENT
Start: 2021-05-14 | End: 2021-05-14

## 2021-05-14 RX ORDER — BUPIVACAINE HYDROCHLORIDE 5 MG/ML
20 INJECTION, SOLUTION EPIDURAL; INTRACAUDAL
Status: COMPLETED | OUTPATIENT
Start: 2021-05-14 | End: 2021-05-14

## 2021-05-14 RX ADMIN — BUPIVACAINE HYDROCHLORIDE 100 MG: 5 INJECTION, SOLUTION EPIDURAL; INTRACAUDAL at 12:05

## 2021-05-14 RX ADMIN — HEPARIN SODIUM 10000 UNITS: 1000 INJECTION, SOLUTION INTRAVENOUS; SUBCUTANEOUS at 12:05

## 2021-05-14 RX ADMIN — TRIAMCINOLONE ACETONIDE 40 MG: 40 INJECTION, SUSPENSION INTRA-ARTICULAR; INTRAMUSCULAR at 12:05

## 2021-05-20 ENCOUNTER — PATIENT MESSAGE (OUTPATIENT)
Dept: UROGYNECOLOGY | Facility: CLINIC | Age: 56
End: 2021-05-20

## 2021-05-21 ENCOUNTER — PROCEDURE VISIT (OUTPATIENT)
Dept: UROGYNECOLOGY | Facility: CLINIC | Age: 56
End: 2021-05-21
Payer: COMMERCIAL

## 2021-05-21 VITALS
SYSTOLIC BLOOD PRESSURE: 100 MMHG | WEIGHT: 109.13 LBS | DIASTOLIC BLOOD PRESSURE: 60 MMHG | BODY MASS INDEX: 18.18 KG/M2 | HEIGHT: 65 IN

## 2021-05-21 DIAGNOSIS — N30.10 IC (INTERSTITIAL CYSTITIS): Primary | ICD-10-CM

## 2021-05-21 PROCEDURE — 51700 IRRIGATION OF BLADDER: CPT | Mod: S$GLB,,, | Performed by: NURSE PRACTITIONER

## 2021-05-21 PROCEDURE — 51700 PR IRRIGATION, BLADDER: ICD-10-PCS | Mod: S$GLB,,, | Performed by: NURSE PRACTITIONER

## 2021-05-21 RX ORDER — BUPIVACAINE HYDROCHLORIDE 5 MG/ML
20 INJECTION, SOLUTION EPIDURAL; INTRACAUDAL
Status: COMPLETED | OUTPATIENT
Start: 2021-05-21 | End: 2021-05-21

## 2021-05-21 RX ORDER — HEPARIN SODIUM 1000 [USP'U]/ML
10000 INJECTION, SOLUTION INTRAVENOUS; SUBCUTANEOUS ONCE
Status: COMPLETED | OUTPATIENT
Start: 2021-05-21 | End: 2021-05-21

## 2021-05-21 RX ORDER — TRIAMCINOLONE ACETONIDE 40 MG/ML
40 INJECTION, SUSPENSION INTRA-ARTICULAR; INTRAMUSCULAR ONCE
Status: COMPLETED | OUTPATIENT
Start: 2021-05-21 | End: 2021-05-21

## 2021-05-21 RX ADMIN — HEPARIN SODIUM 10000 UNITS: 1000 INJECTION, SOLUTION INTRAVENOUS; SUBCUTANEOUS at 04:05

## 2021-05-21 RX ADMIN — TRIAMCINOLONE ACETONIDE 40 MG: 40 INJECTION, SUSPENSION INTRA-ARTICULAR; INTRAMUSCULAR at 04:05

## 2021-05-21 RX ADMIN — BUPIVACAINE HYDROCHLORIDE 100 MG: 5 INJECTION, SOLUTION EPIDURAL; INTRACAUDAL at 04:05

## 2021-05-28 ENCOUNTER — PROCEDURE VISIT (OUTPATIENT)
Dept: UROGYNECOLOGY | Facility: CLINIC | Age: 56
End: 2021-05-28
Payer: COMMERCIAL

## 2021-05-28 VITALS
SYSTOLIC BLOOD PRESSURE: 126 MMHG | WEIGHT: 106.69 LBS | HEART RATE: 61 BPM | DIASTOLIC BLOOD PRESSURE: 68 MMHG | HEIGHT: 65 IN | BODY MASS INDEX: 17.77 KG/M2

## 2021-05-28 DIAGNOSIS — R30.0 DYSURIA: Primary | ICD-10-CM

## 2021-05-28 DIAGNOSIS — N30.10 IC (INTERSTITIAL CYSTITIS): ICD-10-CM

## 2021-05-28 LAB
BILIRUB SERPL-MCNC: NEGATIVE MG/DL
BLOOD URINE, POC: ABNORMAL
CLARITY, POC UA: CLEAR
COLOR, POC UA: ABNORMAL
GLUCOSE UR QL STRIP: NEGATIVE
KETONES UR QL STRIP: NEGATIVE
LEUKOCYTE ESTERASE URINE, POC: ABNORMAL
NITRITE, POC UA: NEGATIVE
PH, POC UA: 6
PROTEIN, POC: ABNORMAL
SPECIFIC GRAVITY, POC UA: 1020
UROBILINOGEN, POC UA: NEGATIVE

## 2021-05-28 PROCEDURE — 81002 URINALYSIS NONAUTO W/O SCOPE: CPT | Mod: S$GLB,,, | Performed by: NURSE PRACTITIONER

## 2021-05-28 PROCEDURE — 81002 POCT URINE DIPSTICK WITHOUT MICROSCOPE: ICD-10-PCS | Mod: S$GLB,,, | Performed by: NURSE PRACTITIONER

## 2021-05-28 PROCEDURE — 51700 IRRIGATION OF BLADDER: CPT | Mod: S$GLB,,, | Performed by: NURSE PRACTITIONER

## 2021-05-28 PROCEDURE — 51700 PR IRRIGATION, BLADDER: ICD-10-PCS | Mod: S$GLB,,, | Performed by: NURSE PRACTITIONER

## 2021-05-28 RX ORDER — ESTRADIOL 0.1 MG/G
1 CREAM VAGINAL DAILY
Qty: 42.5 G | Refills: 3 | Status: SHIPPED | OUTPATIENT
Start: 2021-05-28 | End: 2022-06-10

## 2021-05-28 RX ORDER — TRIAMCINOLONE ACETONIDE 40 MG/ML
40 INJECTION, SUSPENSION INTRA-ARTICULAR; INTRAMUSCULAR ONCE
Status: COMPLETED | OUTPATIENT
Start: 2021-05-28 | End: 2021-05-28

## 2021-05-28 RX ORDER — HEPARIN SODIUM 1000 [USP'U]/ML
10000 INJECTION, SOLUTION INTRAVENOUS; SUBCUTANEOUS ONCE
Status: COMPLETED | OUTPATIENT
Start: 2021-05-28 | End: 2021-05-28

## 2021-05-28 RX ORDER — BUPIVACAINE HYDROCHLORIDE 5 MG/ML
20 INJECTION, SOLUTION EPIDURAL; INTRACAUDAL
Status: COMPLETED | OUTPATIENT
Start: 2021-05-28 | End: 2021-05-28

## 2021-05-28 RX ADMIN — BUPIVACAINE HYDROCHLORIDE 100 MG: 5 INJECTION, SOLUTION EPIDURAL; INTRACAUDAL at 12:05

## 2021-05-28 RX ADMIN — HEPARIN SODIUM 10000 UNITS: 1000 INJECTION, SOLUTION INTRAVENOUS; SUBCUTANEOUS at 12:05

## 2021-05-28 RX ADMIN — TRIAMCINOLONE ACETONIDE 40 MG: 40 INJECTION, SUSPENSION INTRA-ARTICULAR; INTRAMUSCULAR at 12:05

## 2021-06-07 ENCOUNTER — PROCEDURE VISIT (OUTPATIENT)
Dept: UROGYNECOLOGY | Facility: CLINIC | Age: 56
End: 2021-06-07
Payer: COMMERCIAL

## 2021-06-07 VITALS
BODY MASS INDEX: 17.82 KG/M2 | HEIGHT: 65 IN | SYSTOLIC BLOOD PRESSURE: 108 MMHG | DIASTOLIC BLOOD PRESSURE: 68 MMHG | WEIGHT: 106.94 LBS | HEART RATE: 64 BPM

## 2021-06-07 DIAGNOSIS — N30.10 IC (INTERSTITIAL CYSTITIS): ICD-10-CM

## 2021-06-07 DIAGNOSIS — R82.90 URINE ABNORMALITY: Primary | ICD-10-CM

## 2021-06-07 PROCEDURE — 51700 PR IRRIGATION, BLADDER: ICD-10-PCS | Mod: S$GLB,,, | Performed by: NURSE PRACTITIONER

## 2021-06-07 PROCEDURE — 51700 IRRIGATION OF BLADDER: CPT | Mod: S$GLB,,, | Performed by: NURSE PRACTITIONER

## 2021-06-07 RX ORDER — TRIAMCINOLONE ACETONIDE 40 MG/ML
40 INJECTION, SUSPENSION INTRA-ARTICULAR; INTRAMUSCULAR ONCE
Status: COMPLETED | OUTPATIENT
Start: 2021-06-07 | End: 2021-06-07

## 2021-06-07 RX ORDER — HEPARIN SODIUM 1000 [USP'U]/ML
10000 INJECTION, SOLUTION INTRAVENOUS; SUBCUTANEOUS ONCE
Status: COMPLETED | OUTPATIENT
Start: 2021-06-07 | End: 2021-06-07

## 2021-06-07 RX ORDER — NITROFURANTOIN 25; 75 MG/1; MG/1
100 CAPSULE ORAL 2 TIMES DAILY
Qty: 14 CAPSULE | Refills: 0 | Status: SHIPPED | OUTPATIENT
Start: 2021-06-07 | End: 2021-09-27 | Stop reason: ALTCHOICE

## 2021-06-07 RX ORDER — BUPIVACAINE HYDROCHLORIDE 5 MG/ML
20 INJECTION, SOLUTION EPIDURAL; INTRACAUDAL
Status: COMPLETED | OUTPATIENT
Start: 2021-06-07 | End: 2021-06-07

## 2021-06-07 RX ADMIN — BUPIVACAINE HYDROCHLORIDE 100 MG: 5 INJECTION, SOLUTION EPIDURAL; INTRACAUDAL at 10:06

## 2021-06-07 RX ADMIN — TRIAMCINOLONE ACETONIDE 40 MG: 40 INJECTION, SUSPENSION INTRA-ARTICULAR; INTRAMUSCULAR at 10:06

## 2021-06-07 RX ADMIN — HEPARIN SODIUM 10000 UNITS: 1000 INJECTION, SOLUTION INTRAVENOUS; SUBCUTANEOUS at 10:06

## 2021-06-30 ENCOUNTER — PATIENT MESSAGE (OUTPATIENT)
Dept: UROGYNECOLOGY | Facility: CLINIC | Age: 56
End: 2021-06-30

## 2021-07-02 ENCOUNTER — PROCEDURE VISIT (OUTPATIENT)
Dept: UROGYNECOLOGY | Facility: CLINIC | Age: 56
End: 2021-07-02
Payer: COMMERCIAL

## 2021-07-02 VITALS
HEIGHT: 65 IN | DIASTOLIC BLOOD PRESSURE: 60 MMHG | SYSTOLIC BLOOD PRESSURE: 100 MMHG | BODY MASS INDEX: 17.92 KG/M2 | WEIGHT: 107.56 LBS

## 2021-07-02 DIAGNOSIS — N30.10 IC (INTERSTITIAL CYSTITIS): Primary | ICD-10-CM

## 2021-07-02 LAB
BILIRUB SERPL-MCNC: NORMAL MG/DL
BLOOD URINE, POC: NORMAL
CLARITY, POC UA: CLEAR
COLOR, POC UA: YELLOW
GLUCOSE UR QL STRIP: NORMAL
KETONES UR QL STRIP: NORMAL
LEUKOCYTE ESTERASE URINE, POC: NORMAL
NITRITE, POC UA: NORMAL
PH, POC UA: 7
PROTEIN, POC: NORMAL
SPECIFIC GRAVITY, POC UA: 1
UROBILINOGEN, POC UA: NORMAL

## 2021-07-02 PROCEDURE — 81002 POCT URINE DIPSTICK WITHOUT MICROSCOPE: ICD-10-PCS | Mod: S$GLB,,, | Performed by: NURSE PRACTITIONER

## 2021-07-02 PROCEDURE — 51700 PR IRRIGATION, BLADDER: ICD-10-PCS | Mod: S$GLB,,, | Performed by: NURSE PRACTITIONER

## 2021-07-02 PROCEDURE — 51700 IRRIGATION OF BLADDER: CPT | Mod: S$GLB,,, | Performed by: NURSE PRACTITIONER

## 2021-07-02 PROCEDURE — 81002 URINALYSIS NONAUTO W/O SCOPE: CPT | Mod: S$GLB,,, | Performed by: NURSE PRACTITIONER

## 2021-07-02 RX ORDER — TRIAMCINOLONE ACETONIDE 40 MG/ML
40 INJECTION, SUSPENSION INTRA-ARTICULAR; INTRAMUSCULAR ONCE
Status: COMPLETED | OUTPATIENT
Start: 2021-07-02 | End: 2021-07-02

## 2021-07-02 RX ORDER — HEPARIN SODIUM 1000 [USP'U]/ML
10000 INJECTION, SOLUTION INTRAVENOUS; SUBCUTANEOUS ONCE
Status: COMPLETED | OUTPATIENT
Start: 2021-07-02 | End: 2021-07-02

## 2021-07-02 RX ORDER — BUPIVACAINE HYDROCHLORIDE 5 MG/ML
20 INJECTION, SOLUTION EPIDURAL; INTRACAUDAL
Status: COMPLETED | OUTPATIENT
Start: 2021-07-02 | End: 2021-07-02

## 2021-07-02 RX ADMIN — BUPIVACAINE HYDROCHLORIDE 100 MG: 5 INJECTION, SOLUTION EPIDURAL; INTRACAUDAL at 01:07

## 2021-07-02 RX ADMIN — TRIAMCINOLONE ACETONIDE 40 MG: 40 INJECTION, SUSPENSION INTRA-ARTICULAR; INTRAMUSCULAR at 01:07

## 2021-07-02 RX ADMIN — HEPARIN SODIUM 10000 UNITS: 1000 INJECTION, SOLUTION INTRAVENOUS; SUBCUTANEOUS at 01:07

## 2022-02-09 DIAGNOSIS — N95.2 VAGINAL ATROPHY: ICD-10-CM

## 2022-05-03 ENCOUNTER — PATIENT MESSAGE (OUTPATIENT)
Dept: UROGYNECOLOGY | Facility: CLINIC | Age: 57
End: 2022-05-03
Payer: COMMERCIAL

## 2022-05-03 DIAGNOSIS — Z12.31 ENCOUNTER FOR SCREENING MAMMOGRAM FOR BREAST CANCER: Primary | ICD-10-CM

## 2022-05-03 RX ORDER — METHENAMINE, SODIUM PHOSPHATE, MONOBASIC, MONOHYDRATE, PHENYL SALICYLATE, METHYLENE BLUE, AND HYOSCYAMINE SULFATE 118; 40.8; 36; 10; .12 MG/1; MG/1; MG/1; MG/1; MG/1
1 CAPSULE ORAL 4 TIMES DAILY PRN
Qty: 120 CAPSULE | Refills: 0 | Status: SHIPPED | OUTPATIENT
Start: 2022-05-03 | End: 2022-06-10

## 2022-05-11 ENCOUNTER — HOSPITAL ENCOUNTER (OUTPATIENT)
Dept: RADIOLOGY | Facility: HOSPITAL | Age: 57
Discharge: HOME | End: 2022-05-11
Attending: NURSE PRACTITIONER
Payer: COMMERCIAL

## 2022-05-11 DIAGNOSIS — Z12.31 ENCOUNTER FOR SCREENING MAMMOGRAM FOR BREAST CANCER: ICD-10-CM

## 2022-05-11 PROCEDURE — 77063 MAMMO DIGITAL SCREENING BILAT WITH TOMO: ICD-10-PCS | Mod: 26,,, | Performed by: RADIOLOGY

## 2022-05-11 PROCEDURE — 77063 BREAST TOMOSYNTHESIS BI: CPT | Mod: 26,,, | Performed by: RADIOLOGY

## 2022-05-11 PROCEDURE — 77067 MAMMO DIGITAL SCREENING BILAT WITH TOMO: ICD-10-PCS | Mod: 26,,, | Performed by: RADIOLOGY

## 2022-05-11 PROCEDURE — 77067 SCR MAMMO BI INCL CAD: CPT | Mod: 26,,, | Performed by: RADIOLOGY

## 2022-05-11 PROCEDURE — 77063 BREAST TOMOSYNTHESIS BI: CPT | Mod: TC,PO

## 2022-05-12 ENCOUNTER — PATIENT MESSAGE (OUTPATIENT)
Dept: UROGYNECOLOGY | Facility: CLINIC | Age: 57
End: 2022-05-12
Payer: COMMERCIAL

## 2022-06-10 ENCOUNTER — OFFICE VISIT (OUTPATIENT)
Dept: UROGYNECOLOGY | Facility: CLINIC | Age: 57
End: 2022-06-10
Payer: COMMERCIAL

## 2022-06-10 VITALS
HEIGHT: 65 IN | SYSTOLIC BLOOD PRESSURE: 120 MMHG | WEIGHT: 112.44 LBS | BODY MASS INDEX: 18.73 KG/M2 | DIASTOLIC BLOOD PRESSURE: 60 MMHG

## 2022-06-10 DIAGNOSIS — N30.10 INTERSTITIAL CYSTITIS: ICD-10-CM

## 2022-06-10 DIAGNOSIS — Z87.19 HX OF CONSTIPATION: ICD-10-CM

## 2022-06-10 DIAGNOSIS — Z01.419 WELL WOMAN EXAM: Primary | ICD-10-CM

## 2022-06-10 DIAGNOSIS — N95.2 VAGINAL ATROPHY: ICD-10-CM

## 2022-06-10 PROCEDURE — 99999 PR PBB SHADOW E&M-EST. PATIENT-LVL III: ICD-10-PCS | Mod: PBBFAC,,, | Performed by: NURSE PRACTITIONER

## 2022-06-10 PROCEDURE — 99999 PR PBB SHADOW E&M-EST. PATIENT-LVL III: CPT | Mod: PBBFAC,,, | Performed by: NURSE PRACTITIONER

## 2022-06-10 PROCEDURE — 3074F PR MOST RECENT SYSTOLIC BLOOD PRESSURE < 130 MM HG: ICD-10-PCS | Mod: CPTII,S$GLB,, | Performed by: NURSE PRACTITIONER

## 2022-06-10 PROCEDURE — 1159F PR MEDICATION LIST DOCUMENTED IN MEDICAL RECORD: ICD-10-PCS | Mod: CPTII,S$GLB,, | Performed by: NURSE PRACTITIONER

## 2022-06-10 PROCEDURE — 99396 PREV VISIT EST AGE 40-64: CPT | Mod: S$GLB,,, | Performed by: NURSE PRACTITIONER

## 2022-06-10 PROCEDURE — 99396 PR PREVENTIVE VISIT,EST,40-64: ICD-10-PCS | Mod: S$GLB,,, | Performed by: NURSE PRACTITIONER

## 2022-06-10 PROCEDURE — 3008F BODY MASS INDEX DOCD: CPT | Mod: CPTII,S$GLB,, | Performed by: NURSE PRACTITIONER

## 2022-06-10 PROCEDURE — 1159F MED LIST DOCD IN RCRD: CPT | Mod: CPTII,S$GLB,, | Performed by: NURSE PRACTITIONER

## 2022-06-10 PROCEDURE — 3008F PR BODY MASS INDEX (BMI) DOCUMENTED: ICD-10-PCS | Mod: CPTII,S$GLB,, | Performed by: NURSE PRACTITIONER

## 2022-06-10 PROCEDURE — 3078F DIAST BP <80 MM HG: CPT | Mod: CPTII,S$GLB,, | Performed by: NURSE PRACTITIONER

## 2022-06-10 PROCEDURE — 1160F RVW MEDS BY RX/DR IN RCRD: CPT | Mod: CPTII,S$GLB,, | Performed by: NURSE PRACTITIONER

## 2022-06-10 PROCEDURE — 3078F PR MOST RECENT DIASTOLIC BLOOD PRESSURE < 80 MM HG: ICD-10-PCS | Mod: CPTII,S$GLB,, | Performed by: NURSE PRACTITIONER

## 2022-06-10 PROCEDURE — 3074F SYST BP LT 130 MM HG: CPT | Mod: CPTII,S$GLB,, | Performed by: NURSE PRACTITIONER

## 2022-06-10 PROCEDURE — 1160F PR REVIEW ALL MEDS BY PRESCRIBER/CLIN PHARMACIST DOCUMENTED: ICD-10-PCS | Mod: CPTII,S$GLB,, | Performed by: NURSE PRACTITIONER

## 2022-06-10 RX ORDER — FLUTICASONE PROPIONATE 50 MCG
SPRAY, SUSPENSION (ML) NASAL
COMMUNITY
Start: 2022-05-03 | End: 2023-07-04

## 2022-06-10 RX ORDER — IBUPROFEN 600 MG/1
600 TABLET ORAL 3 TIMES DAILY
COMMUNITY
Start: 2022-02-03 | End: 2023-05-27

## 2022-06-10 RX ORDER — OMEGA-3 FATTY ACIDS 1000 MG
2 CAPSULE ORAL
COMMUNITY

## 2022-06-10 NOTE — PATIENT INSTRUCTIONS
1)  IC  --Empty bladder every 3 hours.  Empty well: wait a minute, lean forward on toilet.    --Avoid dietary irritants (see sheet).  Keep diary x 3-5 days to determine your irritants.    --URGE: oxybutynin, trospium caused constipation. Uses uribel intermittently.  Use uribel post coital and any time you are having urgency.  --Recommend Prelief to help alkinize the urine:              --Prelief Tablets : Each tablet contains 345 mg calcium glycerophosphate (65 mg of elemental calcium). The tablets also contain 0.25% magnesium            stearate as a processing aid. Two tablets are equivalent to 690 mg calcium glycerophosphate (130mg of elemental calcium).              --Prelief Powder : Each ¼ teaspoon usage of powder is comparable to two tablets. The powder dissolves rapidly in food or non-alcoholic beverages.             Tablets are recommended for taking with alcoholic beverages              --Usage:                           --Tablets: 2-3 tablets, 2 times a day.                          --Powder: ¼ teaspoon of powder 2 times a day. More can be used when needed  --Hydrodistention With Cystoscopy: allows physicians to take a much closer look at the bladder wall. While the AUA no longer suggests that it be used as a diagnostic method (unless a diagnosis is in doubt), hydrodistention may provide modest relief in IC symptoms which can last from three to six months.   --continue  align        2)Vaginal atrophy (dryness):   --continue intrarosa daily         3)Constipation controlled- continue fiber and calm gummies     4)pelvic floor tension  --continue pelvic floor PT home exercise program    5)RTC1 year for annual

## 2022-06-10 NOTE — PROGRESS NOTES
06/10/2022    SUBJECTIVE:   57 y.o. female for annual exam    12/8/14  Title of Operation:  1) Total laparoscopic hysterectomy  2) Bilateral laparoscopic salpingectomy  3) Bilateral laparoscopic uterosacral suspension  4) Cystourethroscopy  5) Posterior repair with perineorrhaphy  6) Intraoperative radiology films for incorrect count: positive for needle in pelvis, removed laparoscopically, repeat film negative and count correct.      Indications for Surgery:  1) UI: (+) UVALDO only if bladder really full and sneezes, not with exercise. UVALDO not daily. (--) pads, no underwear changes. Daytime frequency: Q 2 hours. Nocturia: Yes: 1/night, around 4 AM. (--) dysuria, (--) hematuria, (--) frequent UTIs. Has had 1-2 mild UTIs. (+) complete bladder emptying.  --UF prolonged but normal otherwise. PF incomplete void until catheters removed. Compliance normal. Max capacity >400 mL. DO (--). UVALDO (--).   2) POP: Absent. (--) vaginal bleeding. +nl menses on OCP. (--) vaginal discharge. (+) sexually active. (--) dyspareunia. (+) Vaginal dryness. (--) vaginal estrogen use.   --POP-Q: Aa -1; Ba -1; C -6; Ap 0; Bp 0 (very distal rectocele); D -9. Genital hiatus 3, perineal body 3, total vaginal length 10.  3) BM: (+) constipation/straining. (--) chronic diarrhea. (--) hematochezia. (--) fecal incontinence. (--) fecal smearing/urgency. (+) incomplete evacuation. No splinting.   4) ? IC: Dx last year per Dr. Labadie at [facility]. No cysto. Was counseled to eliminate exacerbating foods--did seem to help. Seems worse after on feet all day or with stress. Sx: frequency, urgency, feels some burning at the end of void, difficult evacuation. +relief with emptying. Still + menses--on OCP due to frequent menses. No correlation with menses. May have some flare with intercourse. Pain with flare: 6/10 only with urination, usually lasts x several weeks but gradually improves. Flares occur Q 2-3 months. Does have some anxiety. No previous treatment.    5) Patient desires ovarian preservation.      Preoperative Diagnosis:  1) Cystocele  2) Rectocele  3) Dysfunctional uterine bleeding  4) Urinary urgency/frequency  5) Concern for interstitial cystitis  6) Chronic constipation  7) Defecatory dysfunction  8) Mixed urinary incontinence     Postoperative Diagnosis:  1) Cystocele  2) Rectocele  3) Dysfunctional uterine bleeding  4) Urinary urgency/frequency  5) Concern for interstitial cystitis  6) Chronic constipation  7) Defecatory dysfunction  8) Mixed urinary incontinence       Past Medical History:   Diagnosis Date    Urinary tract infection     Uterine fibroid        Past Surgical History:   Procedure Laterality Date    HYSTERECTOMY      2014    PELVIC LAPAROSCOPY      uterine septum repair    RHINOPLASTY TIP  1980    TUBAL LIGATION  December 1999    Postpartum    umbilical cyst  2004       Current Outpatient Medications   Medication Sig Dispense Refill    fluticasone propionate (FLONASE) 50 mcg/actuation nasal spray SHAKE LIQUID AND USE 2 SPRAYS IN EACH NOSTRIL DAILY      ibuprofen (ADVIL,MOTRIN) 600 MG tablet Take 600 mg by mouth 3 (three) times daily.      omega-3 fatty acids 1,000 mg Cap Take 2 g by mouth.      UNABLE TO FIND medication name: Bladder Builder      UNABLE TO FIND medication name: Calm      prasterone, dhea, (INTRAROSA) 6.5 mg Inst Place 6.5 mg vaginally once daily. 30 each 11    URIBEL 118-10-40.8-36 mg Cap TAKE 1 CAPSULE BY MOUTH FOUR TIMES DAILY AS NEEDED 120 capsule 0     No current facility-administered medications for this visit.     Allergies: Patient has no known allergies.   Patient's last menstrual period was 11/30/2014.      Well Woman:  Pap:02/2013 normal- patient is post hyst- no further screening needed  Mammo:05/11/2022- normal  Colonoscopy:07/29/2016 normal- repeat in 10 years  Dexa:2014 normal per patient report      Family History  Family History   Problem Relation Age of Onset    Breast cancer Maternal Cousin   "   Ovarian cancer Maternal Cousin     Colon cancer Neg Hx     Diabetes Neg Hx     Hypertension Neg Hx     Stroke Neg Hx     Cervical cancer Neg Hx     Endometrial cancer Neg Hx     Vaginal cancer Neg Hx             ROS:  Feeling well.   No dyspnea or chest pain on exertion.    No abdominal pain, change in bowel habits, black or bloody stools.  Taking probiotic. Benefiber intermittently  History of IC  --taking uribel 1-2  times daily  --No longer taking aloe vera leaf juice extract pills  --no longer taking D-mannose  --no longer taking myrbetriq  Denies urinary symptoms    GYN ROS: no breast pain or new or enlarging lumps on self exam, no vaginal bleeding. No neurological complaints.      OBJECTIVE:   The patient appears well, alert, oriented x 3, in no distress.  /60 (BP Location: Right arm, Patient Position: Sitting, BP Method: Medium (Manual))   Ht 5' 5" (1.651 m)   Wt 51 kg (112 lb 7 oz)   LMP 11/30/2014   BMI 18.71 kg/m²    ENT normal.  Neck supple. No adenopathy or thyromegaly. FINA.   Lungs are clear, good air entry, no wheezes, rhonchi or rales.   S1 and S2 normal, no murmurs, regular rate and rhythm.   Abdomen soft without tenderness, guarding, mass or organomegaly.   Extremities show no edema, normal peripheral pulses.   Neurological is normal, no focal findings.    PELVIC EXAM:   VULVA: normal appearing vulva with no masses, tenderness or lesions,   VAGINA: normal appearing vagina with normal color and discharge, no lesions, atrophic,  CERVIX: surgically absent,   UTERUS: absent,   ADNEXA: no masses  RECTAL: normal rectal, no masses    Aa/Ba -2    ASSESSMENT:   1. Well woman exam     2. Vaginal atrophy  prasterone, dhea, (INTRAROSA) 6.5 mg Inst   3. Interstitial cystitis     4. Hx of constipation         PLAN:     1)  IC  --Empty bladder every 3 hours.  Empty well: wait a minute, lean forward on toilet.    --Avoid dietary irritants (see sheet).  Keep diary x 3-5 days to determine your " irritants.    --URGE: oxybutynin, trospium causde constipation. Uses uribel intermittently.  Use uribel post coital and any time you are having urgency.  --Recommend Prelief to help alkinize the urine:              --Prelief Tablets : Each tablet contains 345 mg calcium glycerophosphate (65 mg of elemental calcium). The tablets also contain 0.25% magnesium            stearate as a processing aid. Two tablets are equivalent to 690 mg calcium glycerophosphate (130mg of elemental calcium).              --Prelief Powder : Each ¼ teaspoon usage of powder is comparable to two tablets. The powder dissolves rapidly in food or non-alcoholic beverages.             Tablets are recommended for taking with alcoholic beverages              --Usage:                           --Tablets: 2-3 tablets, 2 times a day.                          --Powder: ¼ teaspoon of powder 2 times a day. More can be used when needed  --Hydrodistention With Cystoscopy: allows physicians to take a much closer look at the bladder wall. While the AUA no longer suggests that it be used as a diagnostic method (unless a diagnosis is in doubt), hydrodistention may provide modest relief in IC symptoms which can last from three to six months.   --continue  align        2)Vaginal atrophy (dryness):   --continue intrarosa daily         3)Constipation controlled- continue fiber and calm gummies     4)pelvic floor tension  --continue pelvic floor PT home exercise program    5)RTC1 year for annual    30 minutes were spent in face to face time with this patient  90 % of this time was spent in counseling and/or coordination of care    Leona MORELAND Marchand [facility]  Division of Female Pelvic Medicine and Reconstructive Surgery  Department of Obstetrics & Gynecology

## 2023-03-31 ENCOUNTER — PATIENT MESSAGE (OUTPATIENT)
Dept: UROGYNECOLOGY | Facility: CLINIC | Age: 58
End: 2023-03-31
Payer: COMMERCIAL

## 2023-03-31 RX ORDER — METHENAMINE, SODIUM PHOSPHATE, MONOBASIC, MONOHYDRATE, PHENYL SALICYLATE, METHYLENE BLUE, AND HYOSCYAMINE SULFATE 118; 40.8; 36; 10; .12 MG/1; MG/1; MG/1; MG/1; MG/1
CAPSULE ORAL
Qty: 120 CAPSULE | Refills: 3 | Status: SHIPPED | OUTPATIENT
Start: 2023-03-31

## 2023-04-18 ENCOUNTER — TELEPHONE (OUTPATIENT)
Dept: UROGYNECOLOGY | Facility: CLINIC | Age: 58
End: 2023-04-18

## 2023-04-18 DIAGNOSIS — N30.10 IC (INTERSTITIAL CYSTITIS): Primary | ICD-10-CM

## 2023-04-18 DIAGNOSIS — R39.15 URINARY URGENCY: ICD-10-CM

## 2023-04-18 DIAGNOSIS — N95.1 MENOPAUSAL SYMPTOMS: Primary | ICD-10-CM

## 2023-04-18 RX ORDER — VIBEGRON 75 MG/1
75 TABLET, FILM COATED ORAL DAILY
Qty: 30 TABLET | Refills: 11 | Status: SHIPPED | OUTPATIENT
Start: 2023-04-18 | End: 2023-11-22 | Stop reason: ALTCHOICE

## 2023-04-18 NOTE — TELEPHONE ENCOUNTER
Will try gemtesa 75 mg daily.  Will refill uribel for prn.  Scheduled for annual.  Leona Broussard, GILDARDO-BC

## 2023-04-28 ENCOUNTER — PATIENT MESSAGE (OUTPATIENT)
Dept: OBSTETRICS AND GYNECOLOGY | Facility: CLINIC | Age: 58
End: 2023-04-28
Payer: COMMERCIAL

## 2023-05-12 ENCOUNTER — PATIENT MESSAGE (OUTPATIENT)
Dept: OBSTETRICS AND GYNECOLOGY | Facility: CLINIC | Age: 58
End: 2023-05-12
Payer: COMMERCIAL

## 2023-05-12 ENCOUNTER — PATIENT MESSAGE (OUTPATIENT)
Dept: UROGYNECOLOGY | Facility: CLINIC | Age: 58
End: 2023-05-12
Payer: COMMERCIAL

## 2023-05-12 DIAGNOSIS — Z12.31 ENCOUNTER FOR SCREENING MAMMOGRAM FOR BREAST CANCER: Primary | ICD-10-CM

## 2023-05-16 ENCOUNTER — OFFICE VISIT (OUTPATIENT)
Dept: OBSTETRICS AND GYNECOLOGY | Facility: CLINIC | Age: 58
End: 2023-05-16
Payer: COMMERCIAL

## 2023-05-16 DIAGNOSIS — N95.1 MENOPAUSAL SYMPTOMS: Primary | ICD-10-CM

## 2023-05-16 PROCEDURE — 1159F PR MEDICATION LIST DOCUMENTED IN MEDICAL RECORD: ICD-10-PCS | Mod: CPTII,95,, | Performed by: NURSE PRACTITIONER

## 2023-05-16 PROCEDURE — 1160F RVW MEDS BY RX/DR IN RCRD: CPT | Mod: CPTII,95,, | Performed by: NURSE PRACTITIONER

## 2023-05-16 PROCEDURE — 1159F MED LIST DOCD IN RCRD: CPT | Mod: CPTII,95,, | Performed by: NURSE PRACTITIONER

## 2023-05-16 PROCEDURE — 1160F PR REVIEW ALL MEDS BY PRESCRIBER/CLIN PHARMACIST DOCUMENTED: ICD-10-PCS | Mod: CPTII,95,, | Performed by: NURSE PRACTITIONER

## 2023-05-16 PROCEDURE — 99214 PR OFFICE/OUTPT VISIT, EST, LEVL IV, 30-39 MIN: ICD-10-PCS | Mod: 95,,, | Performed by: NURSE PRACTITIONER

## 2023-05-16 PROCEDURE — 99214 OFFICE O/P EST MOD 30 MIN: CPT | Mod: 95,,, | Performed by: NURSE PRACTITIONER

## 2023-05-16 NOTE — PROGRESS NOTES
Subjective:      Tara Unger is a 57 y.o. female who presents to discuss hormone replacement therapy.  Menarche occurred at age 13 and the patient went into menopause at 50 years of age, which was 7 years ago. Patient is requesting hormone replacement therapy due to hot flashes, insomnia, and vaginal dryness, anxiety, decreased libido. She is status post partial hysterectomy in 2016. The patient is not taking hormone replacement therapy. Patient denies post-menopausal vaginal bleeding. The patient is sexually active.  She denies the following contraindications to HRT:  Vaginal bleeding, history of VTE/PE, thrombophilia,  breast cancer, or active liver disease.       Significant bladder issues reported. History of uterine prolapse, interstitial cystitis, pelvic floor dysfunction. Bladder symptoms controlled for most part, established with Leona Broussard Urogypaulina NP. Denies night sweats, however, hot flashes occurring at night and waking from sleep. Significant vaginal dryness, current use of Intrarosa twice weekly- dislike of increased discharge with use. Intermittent constipation, bloating solely with increased fibrous vegetables. Current use of Vitamin D and good sources of dietary calcium.    Pap smear: n/a  Mammogram: 2022  DEXA: n/a      No visits with results within 3 Month(s) from this visit.   Latest known visit with results is:   Procedure visit on 07/02/2021   Component Date Value Ref Range Status    Color, UA 07/02/2021 Yellow   Final    pH, UA 07/02/2021 7   Final    WBC, UA 07/02/2021 n   Final    Nitrite, UA 07/02/2021 n   Final    Protein, POC 07/02/2021 n   Final    Glucose, UA 07/02/2021 n   Final    Ketones, UA 07/02/2021 n   Final    Urobilinogen, UA 07/02/2021 n   Final    Bilirubin, POC 07/02/2021 n   Final    Blood, UA 07/02/2021 n   Final    Clarity, UA 07/02/2021 Clear   Final    Spec Grav UA 07/02/2021 1.005   Final       Past Medical History:   Diagnosis Date    Urinary tract  infection     Uterine fibroid      Past Surgical History:   Procedure Laterality Date    HYSTERECTOMY      2014    PELVIC LAPAROSCOPY      uterine septum repair    RHINOPLASTY TIP  1980    TUBAL LIGATION  1999    Postpartum    umbilical cyst  2004     Social History     Tobacco Use    Smoking status: Never    Smokeless tobacco: Never   Substance Use Topics    Alcohol use: No     Comment: rare    Drug use: No     Family History   Problem Relation Age of Onset    Breast cancer Maternal Cousin     Ovarian cancer Maternal Cousin     Colon cancer Neg Hx     Diabetes Neg Hx     Hypertension Neg Hx     Stroke Neg Hx     Cervical cancer Neg Hx     Endometrial cancer Neg Hx     Vaginal cancer Neg Hx      OB History    Para Term  AB Living   6 4 3 1 2 3   SAB IAB Ectopic Multiple Live Births   2       3      # Outcome Date GA Lbr Tj/2nd Weight Sex Delivery Anes PTL Lv   6 Term            5 SAB            4 SAB            3 Term  38w0d    Vag-Spont   YORDY   2 Term  37w0d    Vag-Spont   YORDY   1   35w0d    Vag-Spont   YORDY       Current Outpatient Medications:     fluticasone propionate (FLONASE) 50 mcg/actuation nasal spray, SHAKE LIQUID AND USE 2 SPRAYS IN EACH NOSTRIL DAILY, Disp: , Rfl:     ibuprofen (ADVIL,MOTRIN) 600 MG tablet, Take 600 mg by mouth 3 (three) times daily., Disp: , Rfl:     methen-m.blue-s.phos-phsal-hyo (URIBEL) 118-10-40.8-36 mg Cap, Take 1 capsule by mouth 4 (four) times daily as needed (bladder pain)., Disp: 120 capsule, Rfl: 11    omega-3 fatty acids 1,000 mg Cap, Take 2 g by mouth., Disp: , Rfl:     prasterone, dhea, (INTRAROSA) 6.5 mg Inst, Place 6.5 mg vaginally once daily., Disp: 30 each, Rfl: 11    UNABLE TO FIND, medication name: Calm, Disp: , Rfl:     URIBEL 118-10-40.8-36 mg Cap, TAKE 1 CAPSULE BY MOUTH FOUR TIMES DAILY AS NEEDED, Disp: 120 capsule, Rfl: 3    vibegron (GEMTESA) 75 mg Tab, Take 75 mg by mouth once daily., Disp: 30 tablet, Rfl: 11    There were no  vitals filed for this visit.  There is no height or weight on file to calculate BMI.    Assessment:    There are no diagnoses linked to this encounter.    Plan:   Risks and benefits of hormone replacement therapy were discussed.  Hormone replacement therapy options, including bioidentical versus non-bioidentical hormones, as well as alternatives discussed.    HRT lab work ordered.    Follow up in 2 weeks    Instructed patient to call if she has any questions.       LILIANE Norris    The patient location is: [place]  The chief complaint leading to consultation is: HRT Consult    Visit type: audiovisual    Face to Face time with patient: 35 minutes  40 minutes of total time spent on the encounter, which includes face to face time and non-face to face time preparing to see the patient (eg, review of tests), Obtaining and/or reviewing separately obtained history, Documenting clinical information in the electronic or other health record, Independently interpreting results (not separately reported) and communicating results to the patient/family/caregiver, or Care coordination (not separately reported).       Each patient to whom he or she provides medical services by telemedicine is:  (1) informed of the relationship between the physician and patient and the respective role of any other health care provider with respect to management of the patient; and (2) notified that he or she may decline to receive medical services by telemedicine and may withdraw from such care at any time.

## 2023-05-19 ENCOUNTER — HOSPITAL ENCOUNTER (OUTPATIENT)
Dept: RADIOLOGY | Facility: HOSPITAL | Age: 58
Discharge: HOME | End: 2023-05-19
Attending: NURSE PRACTITIONER
Payer: COMMERCIAL

## 2023-05-19 DIAGNOSIS — Z12.31 ENCOUNTER FOR SCREENING MAMMOGRAM FOR BREAST CANCER: ICD-10-CM

## 2023-05-19 PROCEDURE — 77067 SCR MAMMO BI INCL CAD: CPT | Mod: 26,,, | Performed by: RADIOLOGY

## 2023-05-19 PROCEDURE — 77063 MAMMO DIGITAL SCREENING BILAT WITH TOMO: ICD-10-PCS | Mod: 26,,, | Performed by: RADIOLOGY

## 2023-05-19 PROCEDURE — 77067 SCR MAMMO BI INCL CAD: CPT | Mod: TC,PO

## 2023-05-19 PROCEDURE — 77067 MAMMO DIGITAL SCREENING BILAT WITH TOMO: ICD-10-PCS | Mod: 26,,, | Performed by: RADIOLOGY

## 2023-05-19 PROCEDURE — 77063 BREAST TOMOSYNTHESIS BI: CPT | Mod: 26,,, | Performed by: RADIOLOGY

## 2023-05-22 ENCOUNTER — PATIENT MESSAGE (OUTPATIENT)
Dept: OBSTETRICS AND GYNECOLOGY | Facility: CLINIC | Age: 58
End: 2023-05-22
Payer: COMMERCIAL

## 2023-05-24 ENCOUNTER — LAB VISIT (OUTPATIENT)
Dept: LAB | Facility: HOSPITAL | Age: 58
End: 2023-05-24
Payer: COMMERCIAL

## 2023-05-24 DIAGNOSIS — N95.1 MENOPAUSAL SYMPTOMS: ICD-10-CM

## 2023-05-24 LAB
25(OH)D3+25(OH)D2 SERPL-MCNC: 63 NG/ML (ref 30–96)
ESTRADIOL SERPL-MCNC: 19 PG/ML
PROGEST SERPL-MCNC: 0.2 NG/ML
TESTOST SERPL-MCNC: 22 NG/DL (ref 5–73)

## 2023-05-24 PROCEDURE — 82306 VITAMIN D 25 HYDROXY: CPT | Performed by: NURSE PRACTITIONER

## 2023-05-24 PROCEDURE — 36415 COLL VENOUS BLD VENIPUNCTURE: CPT | Mod: PO | Performed by: NURSE PRACTITIONER

## 2023-05-24 PROCEDURE — 84402 ASSAY OF FREE TESTOSTERONE: CPT | Performed by: NURSE PRACTITIONER

## 2023-05-24 PROCEDURE — 82670 ASSAY OF TOTAL ESTRADIOL: CPT | Performed by: NURSE PRACTITIONER

## 2023-05-24 PROCEDURE — 84144 ASSAY OF PROGESTERONE: CPT | Performed by: NURSE PRACTITIONER

## 2023-05-24 PROCEDURE — 84403 ASSAY OF TOTAL TESTOSTERONE: CPT | Performed by: NURSE PRACTITIONER

## 2023-05-26 ENCOUNTER — OFFICE VISIT (OUTPATIENT)
Dept: UROGYNECOLOGY | Facility: CLINIC | Age: 58
End: 2023-05-26
Payer: COMMERCIAL

## 2023-05-26 VITALS
WEIGHT: 114.19 LBS | DIASTOLIC BLOOD PRESSURE: 68 MMHG | SYSTOLIC BLOOD PRESSURE: 110 MMHG | BODY MASS INDEX: 19.03 KG/M2 | HEIGHT: 65 IN

## 2023-05-26 DIAGNOSIS — Z87.19 HX OF CONSTIPATION: ICD-10-CM

## 2023-05-26 DIAGNOSIS — Z01.419 WELL WOMAN EXAM: Primary | ICD-10-CM

## 2023-05-26 DIAGNOSIS — N30.10 IC (INTERSTITIAL CYSTITIS): ICD-10-CM

## 2023-05-26 DIAGNOSIS — N95.2 VAGINAL ATROPHY: ICD-10-CM

## 2023-05-26 LAB — TESTOST FREE SERPL-MCNC: <0.4 PG/ML

## 2023-05-26 PROCEDURE — 99396 PR PREVENTIVE VISIT,EST,40-64: ICD-10-PCS | Mod: S$GLB,,, | Performed by: NURSE PRACTITIONER

## 2023-05-26 PROCEDURE — 99999 PR PBB SHADOW E&M-EST. PATIENT-LVL IV: ICD-10-PCS | Mod: PBBFAC,,, | Performed by: NURSE PRACTITIONER

## 2023-05-26 PROCEDURE — 1160F PR REVIEW ALL MEDS BY PRESCRIBER/CLIN PHARMACIST DOCUMENTED: ICD-10-PCS | Mod: CPTII,S$GLB,, | Performed by: NURSE PRACTITIONER

## 2023-05-26 PROCEDURE — 1159F MED LIST DOCD IN RCRD: CPT | Mod: CPTII,S$GLB,, | Performed by: NURSE PRACTITIONER

## 2023-05-26 PROCEDURE — 3008F BODY MASS INDEX DOCD: CPT | Mod: CPTII,S$GLB,, | Performed by: NURSE PRACTITIONER

## 2023-05-26 PROCEDURE — 99396 PREV VISIT EST AGE 40-64: CPT | Mod: S$GLB,,, | Performed by: NURSE PRACTITIONER

## 2023-05-26 PROCEDURE — 3008F PR BODY MASS INDEX (BMI) DOCUMENTED: ICD-10-PCS | Mod: CPTII,S$GLB,, | Performed by: NURSE PRACTITIONER

## 2023-05-26 PROCEDURE — 1159F PR MEDICATION LIST DOCUMENTED IN MEDICAL RECORD: ICD-10-PCS | Mod: CPTII,S$GLB,, | Performed by: NURSE PRACTITIONER

## 2023-05-26 PROCEDURE — 3074F PR MOST RECENT SYSTOLIC BLOOD PRESSURE < 130 MM HG: ICD-10-PCS | Mod: CPTII,S$GLB,, | Performed by: NURSE PRACTITIONER

## 2023-05-26 PROCEDURE — 99999 PR PBB SHADOW E&M-EST. PATIENT-LVL IV: CPT | Mod: PBBFAC,,, | Performed by: NURSE PRACTITIONER

## 2023-05-26 PROCEDURE — 1160F RVW MEDS BY RX/DR IN RCRD: CPT | Mod: CPTII,S$GLB,, | Performed by: NURSE PRACTITIONER

## 2023-05-26 PROCEDURE — 3078F PR MOST RECENT DIASTOLIC BLOOD PRESSURE < 80 MM HG: ICD-10-PCS | Mod: CPTII,S$GLB,, | Performed by: NURSE PRACTITIONER

## 2023-05-26 PROCEDURE — 3078F DIAST BP <80 MM HG: CPT | Mod: CPTII,S$GLB,, | Performed by: NURSE PRACTITIONER

## 2023-05-26 PROCEDURE — 3074F SYST BP LT 130 MM HG: CPT | Mod: CPTII,S$GLB,, | Performed by: NURSE PRACTITIONER

## 2023-05-26 NOTE — PATIENT INSTRUCTIONS
1)  IC  --Empty bladder every 3 hours.  Empty well: wait a minute, lean forward on toilet.    --Avoid dietary irritants (see sheet).  Keep diary x 3-5 days to determine your irritants.    --URGE: oxybutynin, trospium causde constipation. Uses uribel intermittently.  Use uribel post coital and any time you are having urgency.  --trial gemtesa 75 mg  in 2 weeks  --Recommend Prelief to help alkinize the urine:              --Prelief Tablets : Each tablet contains 345 mg calcium glycerophosphate (65 mg of elemental calcium). The tablets also contain 0.25% magnesium            stearate as a processing aid. Two tablets are equivalent to 690 mg calcium glycerophosphate (130mg of elemental calcium).              --Prelief Powder : Each ¼ teaspoon usage of powder is comparable to two tablets. The powder dissolves rapidly in food or non-alcoholic beverages.             Tablets are recommended for taking with alcoholic beverages              --Usage:                           --Tablets: 2-3 tablets, 2 times a day.                          --Powder: ¼ teaspoon of powder 2 times a day. More can be used when needed  --Hydrodistention With Cystoscopy: allows physicians to take a much closer look at the bladder wall. While the AUA no longer suggests that it be used as a diagnostic method (unless a diagnosis is in doubt), hydrodistention may provide modest relief in IC symptoms which can last from three to six months.   --continue  align        2)Vaginal atrophy (dryness):   --continue intrarosa daily       3)Constipation controlled- continue fiber and calm gummies     4)pelvic floor tension  --continue pelvic floor PT home exercise program    5)RTC 1 year for annual

## 2023-05-26 NOTE — PROGRESS NOTES
05/26/2023    SUBJECTIVE:   57 y.o. female for annual exam    12/8/14  Title of Operation:  1) Total laparoscopic hysterectomy  2) Bilateral laparoscopic salpingectomy  3) Bilateral laparoscopic uterosacral suspension  4) Cystourethroscopy  5) Posterior repair with perineorrhaphy  6) Intraoperative radiology films for incorrect count: positive for needle in pelvis, removed laparoscopically, repeat film negative and count correct.      Indications for Surgery:  1) UI: (+) UVALDO only if bladder really full and sneezes, not with exercise. UVALDO not daily. (--) pads, no underwear changes. Daytime frequency: Q 2 hours. Nocturia: Yes: 1/night, around 4 AM. (--) dysuria, (--) hematuria, (--) frequent UTIs. Has had 1-2 mild UTIs. (+) complete bladder emptying.  --UF prolonged but normal otherwise. PF incomplete void until catheters removed. Compliance normal. Max capacity >400 mL. DO (--). UVALDO (--).   2) POP: Absent. (--) vaginal bleeding. +nl menses on OCP. (--) vaginal discharge. (+) sexually active. (--) dyspareunia. (+) Vaginal dryness. (--) vaginal estrogen use.   --POP-Q: Aa -1; Ba -1; C -6; Ap 0; Bp 0 (very distal rectocele); D -9. Genital hiatus 3, perineal body 3, total vaginal length 10.  3) BM: (+) constipation/straining. (--) chronic diarrhea. (--) hematochezia. (--) fecal incontinence. (--) fecal smearing/urgency. (+) incomplete evacuation. No splinting.   4) ? IC: Dx last year per Dr. Labadie at [facility]. No cysto. Was counseled to eliminate exacerbating foods--did seem to help. Seems worse after on feet all day or with stress. Sx: frequency, urgency, feels some burning at the end of void, difficult evacuation. +relief with emptying. Still + menses--on OCP due to frequent menses. No correlation with menses. May have some flare with intercourse. Pain with flare: 6/10 only with urination, usually lasts x several weeks but gradually improves. Flares occur Q 2-3 months. Does have some anxiety. No previous treatment.    5) Patient desires ovarian preservation.      Preoperative Diagnosis:  1) Cystocele  2) Rectocele  3) Dysfunctional uterine bleeding  4) Urinary urgency/frequency  5) Concern for interstitial cystitis  6) Chronic constipation  7) Defecatory dysfunction  8) Mixed urinary incontinence     Postoperative Diagnosis:  1) Cystocele  2) Rectocele  3) Dysfunctional uterine bleeding  4) Urinary urgency/frequency  5) Concern for interstitial cystitis  6) Chronic constipation  7) Defecatory dysfunction  8) Mixed urinary incontinence       Past Medical History:   Diagnosis Date    Urinary tract infection     Uterine fibroid        Past Surgical History:   Procedure Laterality Date    HYSTERECTOMY      2014    PELVIC LAPAROSCOPY      uterine septum repair    RHINOPLASTY TIP  1980    TUBAL LIGATION  December 1999    Postpartum    umbilical cyst  2004       Current Outpatient Medications   Medication Sig Dispense Refill    fluticasone propionate (FLONASE) 50 mcg/actuation nasal spray SHAKE LIQUID AND USE 2 SPRAYS IN EACH NOSTRIL DAILY      methen-m.blue-s.phos-phsal-hyo (URIBEL) 118-10-40.8-36 mg Cap Take 1 capsule by mouth 4 (four) times daily as needed (bladder pain). 120 capsule 11    omega-3 fatty acids 1,000 mg Cap Take 2 g by mouth.      prasterone, dhea, (INTRAROSA) 6.5 mg Inst Place 6.5 mg vaginally once daily. 30 each 11    UNABLE TO FIND medication name: Calm      URIBEL 118-10-40.8-36 mg Cap TAKE 1 CAPSULE BY MOUTH FOUR TIMES DAILY AS NEEDED 120 capsule 3    vibegron (GEMTESA) 75 mg Tab Take 75 mg by mouth once daily. 30 tablet 11    ibuprofen (ADVIL,MOTRIN) 600 MG tablet Take 600 mg by mouth 3 (three) times daily.       No current facility-administered medications for this visit.     Allergies: Patient has no known allergies.   Patient's last menstrual period was 11/30/2014.      Well Woman:  Pap:02/2013 normal- patient is post hyst- no further screening needed  Mammo:05/19/2022- pending  Colonoscopy:07/29/2016  "normal- repeat in 10 years  Dexa:2014 normal per patient report      Family History  Family History   Problem Relation Age of Onset    Breast cancer Maternal Cousin     Ovarian cancer Maternal Cousin     Colon cancer Neg Hx     Diabetes Neg Hx     Hypertension Neg Hx     Stroke Neg Hx     Cervical cancer Neg Hx     Endometrial cancer Neg Hx     Vaginal cancer Neg Hx             ROS:  Feeling well.   No dyspnea or chest pain on exertion.    No abdominal pain, change in bowel habits, black or bloody stools.  Taking probiotic. Benefiber intermittently  History of IC  --taking uribel 1-2  times daily  --No longer taking aloe vera leaf juice extract pills  --no longer taking D-mannose  --no longer taking myrbetriq  Denies urinary symptoms    GYN ROS: no breast pain or new or enlarging lumps on self exam, no vaginal bleeding. No neurological complaints.      OBJECTIVE:   The patient appears well, alert, oriented x 3, in no distress.  /68   Ht 5' 5" (1.651 m)   Wt 51.8 kg (114 lb 3.2 oz)   LMP 11/30/2014   BMI 19.00 kg/m²    ENT normal.  Neck supple. No adenopathy or thyromegaly. FINA.   Normal respiratory effort  Pulse with regular rate and rhythm.   Abdomen soft without tenderness, guarding, mass or organomegaly.   Extremities show no edema, normal peripheral pulses.   Neurological is normal, no focal findings.    PELVIC EXAM:   VULVA: normal appearing vulva with no masses, tenderness or lesions,   VAGINA: normal appearing vagina with normal color and discharge, no lesions, atrophic,  CERVIX: surgically absent,   UTERUS: absent,   ADNEXA: no masses  RECTAL: normal rectal, no masses    Aa/Ba -2    ASSESSMENT:   1. Well woman exam        2. IC (interstitial cystitis)        3. Vaginal atrophy        4. Hx of constipation              PLAN:     1)  IC  --Empty bladder every 3 hours.  Empty well: wait a minute, lean forward on toilet.    --Avoid dietary irritants (see sheet).  Keep diary x 3-5 days to determine " your irritants.    --URGE: oxybutynin, trospium causde constipation. Uses uribel intermittently.  Use uribel post coital and any time you are having urgency.  --trial gemtesa 75 mg  in 2 weeks  --Recommend Prelief to help alkinize the urine:              --Prelief Tablets : Each tablet contains 345 mg calcium glycerophosphate (65 mg of elemental calcium). The tablets also contain 0.25% magnesium            stearate as a processing aid. Two tablets are equivalent to 690 mg calcium glycerophosphate (130mg of elemental calcium).              --Prelief Powder : Each ¼ teaspoon usage of powder is comparable to two tablets. The powder dissolves rapidly in food or non-alcoholic beverages.             Tablets are recommended for taking with alcoholic beverages              --Usage:                           --Tablets: 2-3 tablets, 2 times a day.                          --Powder: ¼ teaspoon of powder 2 times a day. More can be used when needed  --Hydrodistention With Cystoscopy: allows physicians to take a much closer look at the bladder wall. While the AUA no longer suggests that it be used as a diagnostic method (unless a diagnosis is in doubt), hydrodistention may provide modest relief in IC symptoms which can last from three to six months.   --continue  align        2)Vaginal atrophy (dryness):   --continue intrarosa daily       3)Constipation controlled- continue fiber and calm gummies     4)pelvic floor tension  --continue pelvic floor PT home exercise program    5)RTC 1 year for annual    30 minutes were spent in face to face time with this patient  90 % of this time was spent in counseling and/or coordination of care    Leona MORELAND [facility]and [facility]  Division of Female Pelvic Medicine and Reconstructive Surgery  Department of Obstetrics & Gynecology

## 2023-06-05 ENCOUNTER — PATIENT MESSAGE (OUTPATIENT)
Dept: UROGYNECOLOGY | Facility: CLINIC | Age: 58
End: 2023-06-05
Payer: COMMERCIAL

## 2023-06-05 ENCOUNTER — PATIENT MESSAGE (OUTPATIENT)
Dept: OBSTETRICS AND GYNECOLOGY | Facility: CLINIC | Age: 58
End: 2023-06-05
Payer: COMMERCIAL

## 2023-06-26 ENCOUNTER — OFFICE VISIT (OUTPATIENT)
Dept: OBSTETRICS AND GYNECOLOGY | Facility: CLINIC | Age: 58
End: 2023-06-26
Payer: COMMERCIAL

## 2023-06-26 VITALS
HEART RATE: 72 BPM | WEIGHT: 110 LBS | BODY MASS INDEX: 18.33 KG/M2 | HEIGHT: 65 IN | DIASTOLIC BLOOD PRESSURE: 75 MMHG | SYSTOLIC BLOOD PRESSURE: 122 MMHG

## 2023-06-26 DIAGNOSIS — N95.1 MENOPAUSAL SYMPTOMS: Primary | ICD-10-CM

## 2023-06-26 DIAGNOSIS — N95.1 VAGINAL DRYNESS, MENOPAUSAL: ICD-10-CM

## 2023-06-26 PROCEDURE — 1159F MED LIST DOCD IN RCRD: CPT | Mod: CPTII,S$GLB,, | Performed by: NURSE PRACTITIONER

## 2023-06-26 PROCEDURE — 1160F RVW MEDS BY RX/DR IN RCRD: CPT | Mod: CPTII,S$GLB,, | Performed by: NURSE PRACTITIONER

## 2023-06-26 PROCEDURE — 99999 PR PBB SHADOW E&M-EST. PATIENT-LVL III: ICD-10-PCS | Mod: PBBFAC,,, | Performed by: NURSE PRACTITIONER

## 2023-06-26 PROCEDURE — 1160F PR REVIEW ALL MEDS BY PRESCRIBER/CLIN PHARMACIST DOCUMENTED: ICD-10-PCS | Mod: CPTII,S$GLB,, | Performed by: NURSE PRACTITIONER

## 2023-06-26 PROCEDURE — 99213 PR OFFICE/OUTPT VISIT, EST, LEVL III, 20-29 MIN: ICD-10-PCS | Mod: S$GLB,,, | Performed by: NURSE PRACTITIONER

## 2023-06-26 PROCEDURE — 3078F DIAST BP <80 MM HG: CPT | Mod: CPTII,S$GLB,, | Performed by: NURSE PRACTITIONER

## 2023-06-26 PROCEDURE — 3074F PR MOST RECENT SYSTOLIC BLOOD PRESSURE < 130 MM HG: ICD-10-PCS | Mod: CPTII,S$GLB,, | Performed by: NURSE PRACTITIONER

## 2023-06-26 PROCEDURE — 3008F BODY MASS INDEX DOCD: CPT | Mod: CPTII,S$GLB,, | Performed by: NURSE PRACTITIONER

## 2023-06-26 PROCEDURE — 3008F PR BODY MASS INDEX (BMI) DOCUMENTED: ICD-10-PCS | Mod: CPTII,S$GLB,, | Performed by: NURSE PRACTITIONER

## 2023-06-26 PROCEDURE — 3078F PR MOST RECENT DIASTOLIC BLOOD PRESSURE < 80 MM HG: ICD-10-PCS | Mod: CPTII,S$GLB,, | Performed by: NURSE PRACTITIONER

## 2023-06-26 PROCEDURE — 99213 OFFICE O/P EST LOW 20 MIN: CPT | Mod: S$GLB,,, | Performed by: NURSE PRACTITIONER

## 2023-06-26 PROCEDURE — 3074F SYST BP LT 130 MM HG: CPT | Mod: CPTII,S$GLB,, | Performed by: NURSE PRACTITIONER

## 2023-06-26 PROCEDURE — 99999 PR PBB SHADOW E&M-EST. PATIENT-LVL III: CPT | Mod: PBBFAC,,, | Performed by: NURSE PRACTITIONER

## 2023-06-26 PROCEDURE — 1159F PR MEDICATION LIST DOCUMENTED IN MEDICAL RECORD: ICD-10-PCS | Mod: CPTII,S$GLB,, | Performed by: NURSE PRACTITIONER

## 2023-06-26 RX ORDER — PROGESTERONE 200 MG/1
200 CAPSULE ORAL NIGHTLY
Qty: 30 CAPSULE | Refills: 11 | Status: SHIPPED | OUTPATIENT
Start: 2023-06-26 | End: 2023-06-26

## 2023-06-26 RX ORDER — PROGESTERONE 200 MG/1
200 CAPSULE ORAL NIGHTLY
Qty: 30 CAPSULE | Refills: 11 | Status: SHIPPED | OUTPATIENT
Start: 2023-06-26 | End: 2023-12-22

## 2023-06-26 RX ORDER — ESTRADIOL 0.05 MG/D
1 FILM, EXTENDED RELEASE TRANSDERMAL
Qty: 8 PATCH | Refills: 11 | Status: SHIPPED | OUTPATIENT
Start: 2023-06-26 | End: 2023-06-26

## 2023-06-26 RX ORDER — AMOXICILLIN 500 MG/1
1000 CAPSULE ORAL 3 TIMES DAILY
COMMUNITY
Start: 2023-06-23 | End: 2023-12-22

## 2023-06-26 RX ORDER — ESTRADIOL 0.05 MG/D
1 FILM, EXTENDED RELEASE TRANSDERMAL
Qty: 8 PATCH | Refills: 11 | Status: SHIPPED | OUTPATIENT
Start: 2023-06-26 | End: 2023-09-18

## 2023-06-26 RX ORDER — AZITHROMYCIN 250 MG/1
250 TABLET, FILM COATED ORAL
COMMUNITY
Start: 2023-06-22 | End: 2023-12-22

## 2023-06-27 RX ORDER — ESTRADIOL 4 UG/1
4 INSERT VAGINAL
Qty: 8 EACH | Refills: 11 | Status: SHIPPED | OUTPATIENT
Start: 2023-06-29 | End: 2023-09-18

## 2023-06-27 RX ORDER — ESTRADIOL 4 UG/1
4 INSERT VAGINAL NIGHTLY
Qty: 14 EACH | Refills: 0 | Status: SHIPPED | OUTPATIENT
Start: 2023-06-27 | End: 2023-07-11

## 2023-06-27 NOTE — PROGRESS NOTES
Subjective:      Tara Unger is a 58 y.o. female who presents for hormone replacement therapy follow up. At our last visit, we had discussed her persistent menopausal symptoms hot flashes, insomnia, and vaginal dryness, anxiety, decreased libido. She is status post partial hysterectomy in 2016. She desires to discuss treatment options.    Lab Visit on 2023   Component Date Value Ref Range Status    Testosterone, Total 2023 22  5 - 73 ng/dL Final    Testosterone, Free 2023 <0.4  pg/mL Final    Progesterone 2023 0.2  See Text ng/mL Final    Estradiol 2023 19  See Text pg/mL Final    Vit D, 25-Hydroxy 2023 63  30 - 96 ng/mL Final       Past Medical History:   Diagnosis Date    Urinary tract infection     Uterine fibroid      Past Surgical History:   Procedure Laterality Date    HYSTERECTOMY          PELVIC LAPAROSCOPY      uterine septum repair    RHINOPLASTY TIP      TUBAL LIGATION  1999    Postpartum    umbilical cyst       Social History     Tobacco Use    Smoking status: Never    Smokeless tobacco: Never   Substance Use Topics    Alcohol use: No     Comment: rare    Drug use: No     Family History   Problem Relation Age of Onset    Breast cancer Maternal Cousin     Ovarian cancer Maternal Cousin     Colon cancer Neg Hx     Diabetes Neg Hx     Hypertension Neg Hx     Stroke Neg Hx     Cervical cancer Neg Hx     Endometrial cancer Neg Hx     Vaginal cancer Neg Hx      OB History    Para Term  AB Living   6 4 3 1 2 3   SAB IAB Ectopic Multiple Live Births   2       3      # Outcome Date GA Lbr Tj/2nd Weight Sex Delivery Anes PTL Lv   6 Term            5 SAB            4 SAB            3 Term  38w0d    Vag-Spont   YORDY   2 Term  37w0d    Vag-Spont   YORDY   1   35w0d    Vag-Spont   YORDY       Current Outpatient Medications:     amoxicillin (AMOXIL) 500 MG capsule, Take 1,000 mg by mouth 3 (three) times daily., Disp: , Rfl:      "azithromycin (Z-THELMA) 250 MG tablet, Take 250 mg by mouth., Disp: , Rfl:     fluticasone propionate (FLONASE) 50 mcg/actuation nasal spray, SHAKE LIQUID AND USE 2 SPRAYS IN EACH NOSTRIL DAILY, Disp: , Rfl:     methaylaMelecaylaZhannablue-s.phos-phsal-hyo (URIBEL) 118-10-40.8-36 mg Cap, Take 1 capsule by mouth 4 (four) times daily as needed (bladder pain)., Disp: 120 capsule, Rfl: 11    omega-3 fatty acids 1,000 mg Cap, Take 2 g by mouth., Disp: , Rfl:     UNABLE TO FIND, medication name: Calm, Disp: , Rfl:     URIBEL 118-10-40.8-36 mg Cap, TAKE 1 CAPSULE BY MOUTH FOUR TIMES DAILY AS NEEDED, Disp: 120 capsule, Rfl: 3    vibegron (GEMTESA) 75 mg Tab, Take 75 mg by mouth once daily., Disp: 30 tablet, Rfl: 11    [START ON 6/29/2023] estradioL (IMVEXXY MAINTENANCE PACK) 4 mcg Inst, Place 4 mcg vaginally twice a week., Disp: 8 each, Rfl: 11    estradioL (IMVEXXY STARTER PACK) 4 mcg InPk, Place 4 mcg vaginally every evening. for 14 days, Disp: 14 each, Rfl: 0    estradiol 0.05 mg/24 hr td ptsw (VIVELLE-DOT) 0.05 mg/24 hr, Place 1 patch onto the skin twice a week., Disp: 8 patch, Rfl: 11    progesterone (PROMETRIUM) 200 MG capsule, Take 1 capsule (200 mg total) by mouth nightly., Disp: 30 capsule, Rfl: 11    Vitals:    06/26/23 1046   BP: 122/75   Pulse: 72   Weight: 49.9 kg (110 lb)   Height: 5' 5" (1.651 m)   PainSc: 0-No pain     Body mass index is 18.3 kg/m².    Assessment:    Menopausal symptoms  -     Discontinue: estradiol 0.05 mg/24 hr td ptsw (VIVELLE-DOT) 0.05 mg/24 hr; Place 1 patch onto the skin twice a week.  Dispense: 8 patch; Refill: 11  -     Discontinue: progesterone (PROMETRIUM) 200 MG capsule; Take 1 capsule (200 mg total) by mouth nightly.  Dispense: 30 capsule; Refill: 11  -     estradiol 0.05 mg/24 hr td ptsw (VIVELLE-DOT) 0.05 mg/24 hr; Place 1 patch onto the skin twice a week.  Dispense: 8 patch; Refill: 11  -     progesterone (PROMETRIUM) 200 MG capsule; Take 1 capsule (200 mg total) by mouth nightly.  Dispense: " 30 capsule; Refill: 11    Vaginal dryness, menopausal  -     estradioL (IMVEXXY STARTER PACK) 4 mcg InPk; Place 4 mcg vaginally every evening. for 14 days  Dispense: 14 each; Refill: 0  -     estradioL (IMVEXXY MAINTENANCE PACK) 4 mcg Inst; Place 4 mcg vaginally twice a week.  Dispense: 8 each; Refill: 11        Plan:   Risks and benefits of hormone replacement therapy were discussed.  Hormone replacement therapy options, including bioidentical versus non-bioidentical hormones, as well as alternatives discussed.    Start:   Estradiol Vivelle Dot 0.05 mg/d twice weekly   Progesterone 200 mg orally QPM   Imvexxy 10mg nightly for 2 weeks then twice weekly       Follow up in 3 months for HRT FU and to discuss Testosterone therapy if desired.  Will recheck labs once on typical optimal dose or if having side effects.  Instructed patient to call if she experiences any side effects or has any questions.       VERONICA NorrisC

## 2023-07-03 ENCOUNTER — HOSPITAL ENCOUNTER (EMERGENCY)
Facility: HOSPITAL | Age: 58
Discharge: HOME | End: 2023-07-04
Attending: INTERNAL MEDICINE
Payer: COMMERCIAL

## 2023-07-03 DIAGNOSIS — R05.9 COUGH: ICD-10-CM

## 2023-07-03 LAB
ALBUMIN SERPL-MCNC: 3.6 G/DL (ref 3.3–5.5)
ALLENS TEST: ABNORMAL
ALP SERPL-CCNC: 98 U/L (ref 42–141)
BILIRUB SERPL-MCNC: 0.4 MG/DL (ref 0.2–1.6)
BUN SERPL-MCNC: 16 MG/DL (ref 7–22)
CALCIUM SERPL-MCNC: 9.5 MG/DL (ref 8–10.3)
CHLORIDE SERPL-SCNC: 104 MMOL/L (ref 98–108)
CREAT SERPL-MCNC: 0.6 MG/DL (ref 0.6–1.2)
CTP QC/QA: YES
GLUCOSE SERPL-MCNC: 130 MG/DL (ref 73–118)
HCO3 UR-SCNC: 24.1 MMOL/L (ref 24–28)
HCT, POC: NORMAL
HGB, POC: NORMAL (ref 14–18)
INFLUENZA A ANTIGEN, POC: NEGATIVE
INFLUENZA B ANTIGEN, POC: NEGATIVE
LDH SERPL L TO P-CCNC: 0.59 MMOL/L (ref 0.5–2.2)
MCH, POC: NORMAL
MCHC, POC: NORMAL
MCV, POC: NORMAL
MPV, POC: NORMAL
PCO2 BLDA: 35.3 MMHG (ref 35–45)
PH SMN: 7.44 [PH] (ref 7.35–7.45)
PO2 BLDA: 78 MMHG (ref 40–60)
POC ALT (SGPT): 28 U/L (ref 10–47)
POC AST (SGOT): 23 U/L (ref 11–38)
POC BE: 0 MMOL/L
POC PLATELET COUNT: NORMAL
POC RAPID STREP A: NEGATIVE
POC SATURATED O2: 96 % (ref 95–100)
POC TCO2: 25 MMOL/L (ref 24–29)
POC TCO2: 26 MMOL/L (ref 18–33)
POTASSIUM BLD-SCNC: 3.6 MMOL/L (ref 3.6–5.1)
PROTEIN, POC: 7 G/DL (ref 6.4–8.1)
RBC, POC: NORMAL
RDW, POC: NORMAL
SAMPLE: ABNORMAL
SARS-COV-2 RDRP RESP QL NAA+PROBE: NEGATIVE
SITE: ABNORMAL
SODIUM BLD-SCNC: 137 MMOL/L (ref 128–145)
WBC, POC: NORMAL

## 2023-07-03 PROCEDURE — 80053 COMPREHEN METABOLIC PANEL: CPT | Mod: ER

## 2023-07-03 PROCEDURE — 85025 COMPLETE CBC W/AUTO DIFF WBC: CPT | Mod: ER

## 2023-07-03 PROCEDURE — 87635 SARS-COV-2 COVID-19 AMP PRB: CPT | Mod: ER | Performed by: INTERNAL MEDICINE

## 2023-07-03 PROCEDURE — 87804 INFLUENZA ASSAY W/OPTIC: CPT | Mod: 59,ER

## 2023-07-03 PROCEDURE — 99284 EMERGENCY DEPT VISIT MOD MDM: CPT | Mod: 25,ER

## 2023-07-03 PROCEDURE — 82803 BLOOD GASES ANY COMBINATION: CPT | Mod: ER

## 2023-07-04 VITALS
HEART RATE: 89 BPM | SYSTOLIC BLOOD PRESSURE: 108 MMHG | HEIGHT: 65 IN | TEMPERATURE: 99 F | WEIGHT: 110 LBS | DIASTOLIC BLOOD PRESSURE: 72 MMHG | OXYGEN SATURATION: 96 % | BODY MASS INDEX: 18.33 KG/M2 | RESPIRATION RATE: 18 BRPM

## 2023-07-04 PROBLEM — R05.9 COUGH: Status: ACTIVE | Noted: 2023-07-04

## 2023-07-04 RX ORDER — ALBUTEROL SULFATE 90 UG/1
2 AEROSOL, METERED RESPIRATORY (INHALATION) EVERY 6 HOURS PRN
COMMUNITY
Start: 2023-06-09 | End: 2023-12-22

## 2023-07-04 RX ORDER — FLUTICASONE PROPIONATE 50 MCG
2 SPRAY, SUSPENSION (ML) NASAL DAILY
Qty: 15 G | Refills: 0 | Status: SHIPPED | OUTPATIENT
Start: 2023-07-04

## 2023-07-04 RX ORDER — AZELASTINE 1 MG/ML
2 SPRAY, METERED NASAL 2 TIMES DAILY
Qty: 30 ML | Refills: 0 | Status: SHIPPED | OUTPATIENT
Start: 2023-07-04 | End: 2023-12-22

## 2023-07-04 RX ORDER — METHYLPREDNISOLONE 4 MG/1
1 TABLET ORAL DAILY
COMMUNITY
Start: 2023-06-22 | End: 2023-12-20 | Stop reason: ALTCHOICE

## 2023-07-04 NOTE — ED TRIAGE NOTES
Tara Unger, a 58 y.o. female presents to the ED w/ complaint of feeling worse after being diagnosed with pneumonia 10 days ago and still running fever, coughing up green phlegm and sob.  She finished a round of azithromycin and steroids.      Triage note:  Chief Complaint   Patient presents with    URI     Pt reports dx of pneumonia approx 10 days ago, completed Z-pack and amoxicillin and steroid regimen; felt mildly improved for a few days and now reports worsening fever, SOB, productive cough with green phlegm and ear fullness since Saturday; Pt took 1000mg Tylenol at 2200 tonight for fever of 100.4F at home.      Review of patient's allergies indicates:  No Known Allergies  Past Medical History:   Diagnosis Date    Urinary tract infection     Uterine fibroid

## 2023-07-04 NOTE — ED PROVIDER NOTES
Encounter Date: 7/3/2023       History     Chief Complaint   Patient presents with    URI     Pt reports dx of pneumonia approx 10 days ago, completed Z-pack and amoxicillin and steroid regimen; felt mildly improved for a few days and now reports worsening fever, SOB, productive cough with green phlegm and ear fullness since Saturday; Pt took 1000mg Tylenol at 2200 tonight for fever of 100.4F at home.      58-year-old female presents to the emergency department complaining of persistent cough times several weeks.  She states she was diagnosed with pneumonia approximately 10 days ago and completed a Z-Alfie.  She endorses improvement in symptoms shortly after antibiotic treatment, but now states cough has returned.  She states she was prescribed fluticasone previously, but does not take it regularly.  She also states she has had low-grade fever over the past day and recently completed a Medrol Dosepak.    The history is provided by the patient. No  was used.   Review of patient's allergies indicates:  No Known Allergies  Past Medical History:   Diagnosis Date    Urinary tract infection     Uterine fibroid      Past Surgical History:   Procedure Laterality Date    HYSTERECTOMY      2014    PELVIC LAPAROSCOPY      uterine septum repair    RHINOPLASTY TIP  1980    TUBAL LIGATION  December 1999    Postpartum    umbilical cyst  2004     Family History   Problem Relation Age of Onset    Breast cancer Maternal Cousin     Ovarian cancer Maternal Cousin     Colon cancer Neg Hx     Diabetes Neg Hx     Hypertension Neg Hx     Stroke Neg Hx     Cervical cancer Neg Hx     Endometrial cancer Neg Hx     Vaginal cancer Neg Hx      Social History     Tobacco Use    Smoking status: Never    Smokeless tobacco: Never   Substance Use Topics    Alcohol use: No     Comment: rare    Drug use: No     Review of Systems   Constitutional:  Positive for fever. Negative for chills.   HENT:  Positive for congestion, rhinorrhea  and voice change. Negative for trouble swallowing.    Respiratory:  Positive for cough. Negative for shortness of breath.    Cardiovascular:  Negative for chest pain and palpitations.   Gastrointestinal:  Negative for nausea and vomiting.   All other systems reviewed and are negative.    Physical Exam     Initial Vitals [07/03/23 2306]   BP Pulse Resp Temp SpO2   110/71 94 18 98.5 °F (36.9 °C) 96 %      MAP       --         Physical Exam    Nursing note and vitals reviewed.  Constitutional: She is not diaphoretic. No distress.   HENT:   Head: Normocephalic and atraumatic.   Right Ear: External ear normal.   Left Ear: External ear normal.   Clear postnasal drip, posterior oropharyngeal erythema without exudate or edema, enlarged nasal turbinates.  Bilateral tympanic membranes are normal   Eyes: Conjunctivae are normal.   Neck:   Normal range of motion.  Cardiovascular:  Normal rate and regular rhythm.           Pulmonary/Chest: Breath sounds normal. No respiratory distress.   Abdominal: Abdomen is soft. Bowel sounds are normal. There is no abdominal tenderness.   Musculoskeletal:         General: Normal range of motion.      Cervical back: Normal range of motion.     Neurological: She is alert. She has normal strength.   Skin: Skin is warm and dry.       ED Course   Procedures  Labs Reviewed   ISTAT PROCEDURE - Abnormal; Notable for the following components:       Result Value    POC PO2 78 (*)     All other components within normal limits   POCT CMP - Abnormal; Notable for the following components:    POC Glucose 130 (*)     All other components within normal limits   POCT CBC   SARS-COV-2 RDRP GENE    Narrative:     This test utilizes isothermal nucleic acid amplification technology to detect the SARS-CoV-2 RdRp nucleic acid segment. The analytical sensitivity (limit of detection) is 500 copies/swab.     A POSITIVE result is indicative of the presence of SARS-CoV-2 RNA; clinical correlation with patient history and  other diagnostic information is necessary to determine patient infection status.    A NEGATIVE result means that SARS-CoV-2 nucleic acids are not present above the limit of detection. A NEGATIVE result should be treated as presumptive. It does not rule out the possibility of COVID-19 and should not be the sole basis for treatment decisions. If COVID-19 is strongly suspected based on clinical and exposure history, re-testing using an alternate molecular assay should be considered.     This test is only for use under the Food and Drug Administration s Emergency Use Authorization (EUA).     Commercial kits are provided by [employer]. Performance characteristics of the EUA have been independently verified by [facility] Department of Pathology and Laboratory Medicine.   _________________________________________________________________   The authorized Fact Sheet for Healthcare Providers and the authorized Fact Sheet for Patients of the ID NOW COVID-19 are available on the FDA website:    https://www.fda.gov/media/672641/download      https://www.fda.gov/media/276336/download       POCT CMP   POCT STREP A, RAPID   POCT RAPID INFLUENZA A/B          Imaging Results              X-Ray Chest PA And Lateral (Final result)  Result time 07/03/23 23:42:14      Final result by Alex Marvin MD (07/03/23 23:42:14)                   Impression:      As above.      Electronically signed by: Alex Marvin MD  Date:    07/03/2023  Time:    23:42               Narrative:    EXAMINATION:  XR CHEST PA AND LATERAL    CLINICAL HISTORY:  Cough, unspecified    TECHNIQUE:  PA and lateral views of the chest were performed.    COMPARISON:  None    FINDINGS:  Cardiac silhouette is normal in size.  Lungs are symmetrically expanded.  Mild coarse interstitial lung changes are seen.  Questionable mild patchy airspace opacities are seen in the bilateral lower lung zones.  Uncertain this may represent chronic change (noting no  prior studies are available for comparison) versus airspace process such as aspiration or infectious process in the right clinical setting.  Otherwise no evidence of focal consolidative process, pneumothorax, or significant pleural effusion.  No acute osseous abnormality identified.                                       Medications - No data to display  Medical Decision Making:   Initial Assessment:   58-year-old female presents to the emergency department complaining of persistent cough times several weeks.  She states she was diagnosed with pneumonia approximately 10 days ago and completed a Z-Alfie.  She endorses improvement in symptoms shortly after antibiotic treatment, but now states cough has returned.  She states she was prescribed fluticasone previously, but does not take it regularly.  She also states she has had low-grade fever over the past day and recently completed a Medrol Dosepak.  ED Management:  Rapid flu and COVID-19 screens were negative as well as rapid strep screen.  CBC and CMP are reassuring except for elevated white blood cell count (patient recently completed a course of steroids).  Patient has been afebrile and serum lactate is normal.  Chest x-ray shows bilateral lower diffuse interstitial changes.  Patient was given instructions for viral URI/cough and received prescriptions for Astelin/fluticasone.  She was advised to follow-up with her primary care physician within the next week for re-evaluation/return to the emergency department if condition worsens.                         Clinical Impression:   Final diagnoses:  [R05.9] Cough        ED Disposition Condition    Discharge Stable          ED Prescriptions       Medication Sig Dispense Start Date End Date Auth. Provider    azelastine (ASTELIN) 137 mcg (0.1 %) nasal spray 2 sprays (274 mcg total) by Nasal route 2 (two) times daily. 30 mL 7/4/2023 8/28/2023 Gen De La Garza MD    fluticasone propionate (FLONASE) 50 mcg/actuation nasal  spray 2 sprays (100 mcg total) by Each Nostril route once daily. 15 g 7/4/2023 -- Gen De La Garza MD          Follow-up Information       Follow up With Specialties Details Why Contact Info    Nadia Jaimes MD Internal Medicine Schedule an appointment as soon as possible for a visit in 1 week For reevaluation [id number] [facility]  [place] [address]  [phone]               Gen De La Garza MD  07/04/23 0659     Fall

## 2023-08-15 ENCOUNTER — PATIENT MESSAGE (OUTPATIENT)
Dept: UROGYNECOLOGY | Facility: CLINIC | Age: 58
End: 2023-08-15
Payer: COMMERCIAL

## 2023-08-15 DIAGNOSIS — N30.10 IC (INTERSTITIAL CYSTITIS): Primary | ICD-10-CM

## 2023-08-16 ENCOUNTER — LAB VISIT (OUTPATIENT)
Dept: LAB | Facility: HOSPITAL | Age: 58
End: 2023-08-16
Attending: NURSE PRACTITIONER
Payer: COMMERCIAL

## 2023-08-16 DIAGNOSIS — N30.10 IC (INTERSTITIAL CYSTITIS): ICD-10-CM

## 2023-08-16 LAB
BILIRUB UR QL STRIP: NEGATIVE
CLARITY UR REFRACT.AUTO: ABNORMAL
COLOR UR AUTO: ABNORMAL
GLUCOSE UR QL STRIP: NEGATIVE
HGB UR QL STRIP: NEGATIVE
KETONES UR QL STRIP: NEGATIVE
LEUKOCYTE ESTERASE UR QL STRIP: NEGATIVE
NITRITE UR QL STRIP: NEGATIVE
PH UR STRIP: 5 [PH] (ref 5–8)
PROT UR QL STRIP: NEGATIVE
SP GR UR STRIP: 1.02 (ref 1–1.03)
URN SPEC COLLECT METH UR: ABNORMAL

## 2023-08-16 PROCEDURE — 81003 URINALYSIS AUTO W/O SCOPE: CPT | Performed by: NURSE PRACTITIONER

## 2023-08-16 PROCEDURE — 87086 URINE CULTURE/COLONY COUNT: CPT | Performed by: NURSE PRACTITIONER

## 2023-08-17 ENCOUNTER — PATIENT MESSAGE (OUTPATIENT)
Dept: UROGYNECOLOGY | Facility: CLINIC | Age: 58
End: 2023-08-17
Payer: COMMERCIAL

## 2023-08-17 LAB — BACTERIA UR CULT: NO GROWTH

## 2023-08-18 ENCOUNTER — PROCEDURE VISIT (OUTPATIENT)
Dept: UROGYNECOLOGY | Facility: CLINIC | Age: 58
End: 2023-08-18
Payer: COMMERCIAL

## 2023-08-18 VITALS
HEIGHT: 65 IN | DIASTOLIC BLOOD PRESSURE: 70 MMHG | WEIGHT: 114 LBS | SYSTOLIC BLOOD PRESSURE: 120 MMHG | BODY MASS INDEX: 18.99 KG/M2

## 2023-08-18 DIAGNOSIS — N30.10 IC (INTERSTITIAL CYSTITIS): Primary | ICD-10-CM

## 2023-08-18 PROCEDURE — 51700 IRRIGATION OF BLADDER: CPT | Mod: S$GLB,,, | Performed by: NURSE PRACTITIONER

## 2023-08-18 PROCEDURE — 51700 PR IRRIGATION, BLADDER: ICD-10-PCS | Mod: S$GLB,,, | Performed by: NURSE PRACTITIONER

## 2023-08-18 RX ORDER — BUPIVACAINE HYDROCHLORIDE 5 MG/ML
10 INJECTION, SOLUTION EPIDURAL; INTRACAUDAL
Status: COMPLETED | OUTPATIENT
Start: 2023-08-18 | End: 2023-08-18

## 2023-08-18 RX ORDER — TRIAMCINOLONE ACETONIDE 40 MG/ML
40 INJECTION, SUSPENSION INTRA-ARTICULAR; INTRAMUSCULAR ONCE
Status: COMPLETED | OUTPATIENT
Start: 2023-08-18 | End: 2023-08-18

## 2023-08-18 RX ORDER — BUPIVACAINE HYDROCHLORIDE 2.5 MG/ML
20 INJECTION, SOLUTION EPIDURAL; INFILTRATION; INTRACAUDAL ONCE
Status: COMPLETED | OUTPATIENT
Start: 2023-08-18 | End: 2023-08-18

## 2023-08-18 RX ORDER — HEPARIN SODIUM 1000 [USP'U]/ML
10000 INJECTION, SOLUTION INTRAVENOUS; SUBCUTANEOUS ONCE
Status: COMPLETED | OUTPATIENT
Start: 2023-08-18 | End: 2023-08-18

## 2023-08-18 RX ADMIN — BUPIVACAINE HYDROCHLORIDE 50 MG: 2.5 INJECTION, SOLUTION EPIDURAL; INFILTRATION; INTRACAUDAL at 10:08

## 2023-08-18 RX ADMIN — HEPARIN SODIUM 10000 UNITS: 1000 INJECTION, SOLUTION INTRAVENOUS; SUBCUTANEOUS at 10:08

## 2023-08-18 RX ADMIN — BUPIVACAINE HYDROCHLORIDE 50 MG: 5 INJECTION, SOLUTION EPIDURAL; INTRACAUDAL at 10:08

## 2023-08-18 RX ADMIN — TRIAMCINOLONE ACETONIDE 40 MG: 40 INJECTION, SUSPENSION INTRA-ARTICULAR; INTRAMUSCULAR at 10:08

## 2023-08-18 NOTE — PROCEDURES
Procedures    A #14 red rubber cath inserted into bladder using sterile technique. The urethral meatus was prepped with betadine prior to cath insertion.  20 cc clear blue urine drained from bladder.  A solution of  5% marcaine 10 mL, 2.5% marcaine 20 mL, heparin 10,000 IU/ml 10 mL, kenalog 40 mg, and sodium bicarb 4.2% (2.5 mEq) was instilled into bladder without difficulty. Procedure was tolerated very well. She was instructed to hold urine for one hour before voiding. GILDARDO Mccarthy-BC

## 2023-08-28 ENCOUNTER — PATIENT MESSAGE (OUTPATIENT)
Dept: OBSTETRICS AND GYNECOLOGY | Facility: CLINIC | Age: 58
End: 2023-08-28
Payer: COMMERCIAL

## 2023-08-29 ENCOUNTER — PATIENT MESSAGE (OUTPATIENT)
Dept: OBSTETRICS AND GYNECOLOGY | Facility: CLINIC | Age: 58
End: 2023-08-29
Payer: COMMERCIAL

## 2023-09-01 ENCOUNTER — TELEPHONE (OUTPATIENT)
Dept: OBSTETRICS AND GYNECOLOGY | Facility: CLINIC | Age: 58
End: 2023-09-01
Payer: COMMERCIAL

## 2023-09-01 DIAGNOSIS — N76.0 ACUTE VAGINITIS: Primary | ICD-10-CM

## 2023-09-01 RX ORDER — FLUCONAZOLE 200 MG/1
200 TABLET ORAL
Qty: 3 TABLET | Refills: 0 | Status: SHIPPED | OUTPATIENT
Start: 2023-09-01 | End: 2023-09-08

## 2023-09-01 NOTE — TELEPHONE ENCOUNTER
Contact with patient, suspected yeast infection, plan to treat, if persistent vulvar symptoms, plan for swabs and possible decrease of Vivelle Dot.

## 2023-09-13 ENCOUNTER — PATIENT MESSAGE (OUTPATIENT)
Dept: OBSTETRICS AND GYNECOLOGY | Facility: CLINIC | Age: 58
End: 2023-09-13
Payer: COMMERCIAL

## 2023-09-18 ENCOUNTER — OFFICE VISIT (OUTPATIENT)
Dept: OBSTETRICS AND GYNECOLOGY | Facility: CLINIC | Age: 58
End: 2023-09-18
Payer: COMMERCIAL

## 2023-09-18 VITALS
SYSTOLIC BLOOD PRESSURE: 96 MMHG | BODY MASS INDEX: 18.43 KG/M2 | DIASTOLIC BLOOD PRESSURE: 60 MMHG | HEIGHT: 65 IN | WEIGHT: 110.63 LBS | HEART RATE: 67 BPM

## 2023-09-18 DIAGNOSIS — N95.1 MENOPAUSAL SYMPTOMS: Primary | ICD-10-CM

## 2023-09-18 PROCEDURE — 1159F PR MEDICATION LIST DOCUMENTED IN MEDICAL RECORD: ICD-10-PCS | Mod: CPTII,S$GLB,, | Performed by: NURSE PRACTITIONER

## 2023-09-18 PROCEDURE — 1160F RVW MEDS BY RX/DR IN RCRD: CPT | Mod: CPTII,S$GLB,, | Performed by: NURSE PRACTITIONER

## 2023-09-18 PROCEDURE — 1160F PR REVIEW ALL MEDS BY PRESCRIBER/CLIN PHARMACIST DOCUMENTED: ICD-10-PCS | Mod: CPTII,S$GLB,, | Performed by: NURSE PRACTITIONER

## 2023-09-18 PROCEDURE — 99999 PR PBB SHADOW E&M-EST. PATIENT-LVL III: CPT | Mod: PBBFAC,,, | Performed by: NURSE PRACTITIONER

## 2023-09-18 PROCEDURE — 3078F DIAST BP <80 MM HG: CPT | Mod: CPTII,S$GLB,, | Performed by: NURSE PRACTITIONER

## 2023-09-18 PROCEDURE — 3074F SYST BP LT 130 MM HG: CPT | Mod: CPTII,S$GLB,, | Performed by: NURSE PRACTITIONER

## 2023-09-18 PROCEDURE — 3008F BODY MASS INDEX DOCD: CPT | Mod: CPTII,S$GLB,, | Performed by: NURSE PRACTITIONER

## 2023-09-18 PROCEDURE — 3078F PR MOST RECENT DIASTOLIC BLOOD PRESSURE < 80 MM HG: ICD-10-PCS | Mod: CPTII,S$GLB,, | Performed by: NURSE PRACTITIONER

## 2023-09-18 PROCEDURE — 3008F PR BODY MASS INDEX (BMI) DOCUMENTED: ICD-10-PCS | Mod: CPTII,S$GLB,, | Performed by: NURSE PRACTITIONER

## 2023-09-18 PROCEDURE — 99214 OFFICE O/P EST MOD 30 MIN: CPT | Mod: S$GLB,,, | Performed by: NURSE PRACTITIONER

## 2023-09-18 PROCEDURE — 99999 PR PBB SHADOW E&M-EST. PATIENT-LVL III: ICD-10-PCS | Mod: PBBFAC,,, | Performed by: NURSE PRACTITIONER

## 2023-09-18 PROCEDURE — 3074F PR MOST RECENT SYSTOLIC BLOOD PRESSURE < 130 MM HG: ICD-10-PCS | Mod: CPTII,S$GLB,, | Performed by: NURSE PRACTITIONER

## 2023-09-18 PROCEDURE — 1159F MED LIST DOCD IN RCRD: CPT | Mod: CPTII,S$GLB,, | Performed by: NURSE PRACTITIONER

## 2023-09-18 PROCEDURE — 99214 PR OFFICE/OUTPT VISIT, EST, LEVL IV, 30-39 MIN: ICD-10-PCS | Mod: S$GLB,,, | Performed by: NURSE PRACTITIONER

## 2023-09-18 RX ORDER — ESTRADIOL 0.03 MG/D
1 FILM, EXTENDED RELEASE TRANSDERMAL
Qty: 8 PATCH | Refills: 11 | Status: SHIPPED | OUTPATIENT
Start: 2023-09-18 | End: 2024-09-17

## 2023-09-18 NOTE — PROGRESS NOTES
Pt fell and hit lip  Prior to falling he was very energetic  Pt cried immediately and is now sleepy  No LOC  No vomiting  Pupils equal  Behaving accordingly  Hit lip or gums, small amount of blood  Now pt bedtime. Advised mom ok to let pt sleep.  May cont Subjective:      Tara Unger is a 58 y.o. female who is here for follow-up of hormone replacement therapy.  At her last visit she had started Prometrium 200 mg PO QHS, Vivelle Dot 0.05 mg/d TD every 3 days. She is SP hysterectomy.     She is currently taking nothing, ran out of medication. Had stopped Prometrium due to some SE.   The patient states the following symptoms have improved:  hot flashes.  Her main concern today is depressed mood and bloating with Prometrium.  She had the following side effects: breast tenderness.  Patient denies post-menopausal vaginal bleeding. The patient is sexually active.     Desires to return to Estrace cream for vaginal symptoms, desires improvement of urethral symptoms.     Lab Visit on 08/16/2023   Component Date Value Ref Range Status    Specimen UA 08/16/2023 Urine, Clean Catch   Final    Color, UA 08/16/2023 Blue (A)  Yellow, Straw, Ingris Final    Appearance, UA 08/16/2023 Hazy (A)  Clear Final    pH, UA 08/16/2023 5.0  5.0 - 8.0 Final    Specific Gravity, UA 08/16/2023 1.025  1.005 - 1.030 Final    Protein, UA 08/16/2023 Negative  Negative Final    Glucose, UA 08/16/2023 Negative  Negative Final    Ketones, UA 08/16/2023 Negative  Negative Final    Bilirubin (UA) 08/16/2023 Negative  Negative Final    Occult Blood UA 08/16/2023 Negative  Negative Final    Nitrite, UA 08/16/2023 Negative  Negative Final    Leukocytes, UA 08/16/2023 Negative  Negative Final    Urine Culture, Routine 08/16/2023 No growth   Final   Admission on 07/03/2023, Discharged on 07/04/2023   Component Date Value Ref Range Status    POC Rapid COVID 07/03/2023 Negative  Negative Final     Acceptable 07/03/2023 Yes   Final    POC PH 07/03/2023 7.441  7.35 - 7.45 Final    POC PCO2 07/03/2023 35.3  35 - 45 mmHg Final    POC PO2 07/03/2023 78 (HH)  40 - 60 mmHg Final    POC HCO3 07/03/2023 24.1  24 - 28 mmol/L Final    POC BE 07/03/2023 0  -2 to 2 mmol/L Final    POC SATURATED O2 07/03/2023  96  95 - 100 % Final    POC Lactate 07/03/2023 0.59  0.5 - 2.2 mmol/L Final    POC TCO2 07/03/2023 25  24 - 29 mmol/L Final    Sample 07/03/2023 VENOUS   Final    Site 07/03/2023 Other   Final    Allens Test 07/03/2023 N/A   Final    POC Rapid Strep A 07/03/2023 negative  Positive/Negative Final    Albumin, POC 07/03/2023 3.6  3.3 - 5.5 g/dL Final    Alkaline Phosphatase, POC 07/03/2023 98  42 - 141 U/L Final    ALT (SGPT), POC 07/03/2023 28  10 - 47 U/L Final    AST (SGOT), POC 07/03/2023 23  11 - 38 U/L Final    POC BUN 07/03/2023 16  7 - 22 mg/dL Final    Calcium, POC 07/03/2023 9.5  8.0 - 10.3 mg/dL Final    POC Chloride 07/03/2023 104  98 - 108 mmol/L Final    POC Creatinine 07/03/2023 0.6  0.6 - 1.2 mg/dL Final    POC Glucose 07/03/2023 130 (H)  73 - 118 mg/dL Final    POC Potassium 07/03/2023 3.6  3.6 - 5.1 mmol/L Final    POC Sodium 07/03/2023 137  128 - 145 mmol/L Final    Bilirubin, POC 07/03/2023 0.4  0.2 - 1.6 mg/dL Final    POC TCO2 07/03/2023 26  18 - 33 mmol/L Final    Protein, POC 07/03/2023 7.0  6.4 - 8.1 g/dL Final    Influenza B Ag 07/03/2023 negative  Positive/Negative Final    Inflenza A Ag 07/03/2023 negative  Positive/Negative Final       Past Medical History:   Diagnosis Date    Urinary tract infection     Uterine fibroid      Past Surgical History:   Procedure Laterality Date    HYSTERECTOMY      2014    PELVIC LAPAROSCOPY      uterine septum repair    RHINOPLASTY TIP  1980    TUBAL LIGATION  December 1999    Postpartum    umbilical cyst  2004     Social History     Tobacco Use    Smoking status: Never    Smokeless tobacco: Never   Substance Use Topics    Alcohol use: No     Comment: rare    Drug use: No     Family History   Problem Relation Age of Onset    Breast cancer Maternal Cousin     Ovarian cancer Maternal Cousin     Colon cancer Neg Hx     Diabetes Neg Hx     Hypertension Neg Hx     Stroke Neg Hx     Cervical cancer Neg Hx     Endometrial cancer Neg Hx     Vaginal cancer Neg Hx   "    OB History    Para Term  AB Living   6 4 3 1 2 3   SAB IAB Ectopic Multiple Live Births   2       3      # Outcome Date GA Lbr Tj/2nd Weight Sex Delivery Anes PTL Lv   6 Term            5 SAB            4 SAB            3 Term  38w0d    Vag-Spont   YORDY   2 Term  37w0d    Vag-Spont   YORDY   1   35w0d    Vag-Spont   YORDY       Current Outpatient Medications:     albuterol (PROVENTIL/VENTOLIN HFA) 90 mcg/actuation inhaler, Inhale 2 puffs into the lungs every 6 (six) hours as needed., Disp: , Rfl:     amoxicillin (AMOXIL) 500 MG capsule, Take 1,000 mg by mouth 3 (three) times daily., Disp: , Rfl:     azithromycin (Z-THELMA) 250 MG tablet, Take 250 mg by mouth., Disp: , Rfl:     fluticasone propionate (FLONASE) 50 mcg/actuation nasal spray, 2 sprays (100 mcg total) by Each Nostril route once daily., Disp: 15 g, Rfl: 0    methen-m.blue-s.phos-phsal-hyo (URIBEL) 118-10-40.8-36 mg Cap, Take 1 capsule by mouth 4 (four) times daily as needed (bladder pain)., Disp: 120 capsule, Rfl: 11    methylPREDNISolone (MEDROL DOSEPACK) 4 mg tablet, Take 1 tablet by mouth once daily., Disp: , Rfl:     omega-3 fatty acids 1,000 mg Cap, Take 2 g by mouth., Disp: , Rfl:     progesterone (PROMETRIUM) 200 MG capsule, Take 1 capsule (200 mg total) by mouth nightly., Disp: 30 capsule, Rfl: 11    UNABLE TO FIND, medication name: Calm, Disp: , Rfl:     URIBEL 118-10-40.8-36 mg Cap, TAKE 1 CAPSULE BY MOUTH FOUR TIMES DAILY AS NEEDED, Disp: 120 capsule, Rfl: 3    vibegron (GEMTESA) 75 mg Tab, Take 75 mg by mouth once daily., Disp: 30 tablet, Rfl: 11    azelastine (ASTELIN) 137 mcg (0.1 %) nasal spray, 2 sprays (274 mcg total) by Nasal route 2 (two) times daily., Disp: 30 mL, Rfl: 0    estradioL (VIVELLE-DOT) 0.025 mg/24 hr, Place 1 patch onto the skin twice a week., Disp: 8 patch, Rfl: 11    Vitals:    23 0959   BP: 96/60   Pulse: 67   Weight: 50.2 kg (110 lb 9.6 oz)   Height: 5' 5" (1.651 m)   PainSc: 0-No pain     Body " mass index is 18.4 kg/m².       Assessment:    Menopausal symptoms  -     estradioL (VIVELLE-DOT) 0.025 mg/24 hr; Place 1 patch onto the skin twice a week.  Dispense: 8 patch; Refill: 11        Plan:   Risks and benefits of hormone replacement therapy were discussed.  Hormone replacement therapy options, including bioidentical versus non-bioidentical hormones, as well as alternatives discussed.    Lower:  Change Vivelle dot dose to 0.025 mg/d  twice weekly    Stop:  Progesterone 200 mg orally QPM    Start:  Estrace cream vaginally twice weekly.    Follow up in 2 months.  Instructed patient to call if she experiences any side effects or has any questions.       LILIANE Norris

## 2023-10-24 ENCOUNTER — OFFICE VISIT (OUTPATIENT)
Dept: OBSTETRICS AND GYNECOLOGY | Facility: CLINIC | Age: 58
End: 2023-10-24
Payer: COMMERCIAL

## 2023-10-24 DIAGNOSIS — N95.1 MENOPAUSAL SYMPTOMS: Primary | ICD-10-CM

## 2023-10-24 PROCEDURE — 1160F PR REVIEW ALL MEDS BY PRESCRIBER/CLIN PHARMACIST DOCUMENTED: ICD-10-PCS | Mod: CPTII,95,, | Performed by: NURSE PRACTITIONER

## 2023-10-24 PROCEDURE — 99214 PR OFFICE/OUTPT VISIT, EST, LEVL IV, 30-39 MIN: ICD-10-PCS | Mod: 95,,, | Performed by: NURSE PRACTITIONER

## 2023-10-24 PROCEDURE — 1159F PR MEDICATION LIST DOCUMENTED IN MEDICAL RECORD: ICD-10-PCS | Mod: CPTII,95,, | Performed by: NURSE PRACTITIONER

## 2023-10-24 PROCEDURE — 99214 OFFICE O/P EST MOD 30 MIN: CPT | Mod: 95,,, | Performed by: NURSE PRACTITIONER

## 2023-10-24 PROCEDURE — 1159F MED LIST DOCD IN RCRD: CPT | Mod: CPTII,95,, | Performed by: NURSE PRACTITIONER

## 2023-10-24 PROCEDURE — 1160F RVW MEDS BY RX/DR IN RCRD: CPT | Mod: CPTII,95,, | Performed by: NURSE PRACTITIONER

## 2023-10-24 NOTE — PROGRESS NOTES
Subjective:      Tara Unger is a 58 y.o. female who is here for follow-up of hormone replacement therapy via virtual visit.  At her last visit on 6 weeks ago, she had noted bloating, breast tenderness, and depressed mood. Desired to restart Estrace cream for improvement of urethral discomfort. Some vaginitis symptoms with Estrace cream, now using Intrarosa without issue.    PLAN on 9/18/2023: Vivelle Dot dosage decreased to 0.025 mg/24 hr TD twice weekly. Estrace cream twice weekly.     The patient states the following symptoms have improved: bloating, breast tenderness, depressed mood.  Her main concern today is none, doing much better.Patient denies post-menopausal vaginal bleeding. The patient is sexually active.     Lab Visit on 08/16/2023   Component Date Value Ref Range Status    Specimen UA 08/16/2023 Urine, Clean Catch   Final    Color, UA 08/16/2023 Blue (A)  Yellow, Straw, Ingris Final    Appearance, UA 08/16/2023 Hazy (A)  Clear Final    pH, UA 08/16/2023 5.0  5.0 - 8.0 Final    Specific Gravity, UA 08/16/2023 1.025  1.005 - 1.030 Final    Protein, UA 08/16/2023 Negative  Negative Final    Glucose, UA 08/16/2023 Negative  Negative Final    Ketones, UA 08/16/2023 Negative  Negative Final    Bilirubin (UA) 08/16/2023 Negative  Negative Final    Occult Blood UA 08/16/2023 Negative  Negative Final    Nitrite, UA 08/16/2023 Negative  Negative Final    Leukocytes, UA 08/16/2023 Negative  Negative Final    Urine Culture, Routine 08/16/2023 No growth   Final       Past Medical History:   Diagnosis Date    Urinary tract infection     Uterine fibroid      Past Surgical History:   Procedure Laterality Date    HYSTERECTOMY      2014    PELVIC LAPAROSCOPY      uterine septum repair    RHINOPLASTY TIP  1980    TUBAL LIGATION  December 1999    Postpartum    umbilical cyst  2004     Social History     Tobacco Use    Smoking status: Never    Smokeless tobacco: Never   Substance Use Topics    Alcohol use: No      Comment: rare    Drug use: No     Family History   Problem Relation Age of Onset    Breast cancer Maternal Cousin     Ovarian cancer Maternal Cousin     Colon cancer Neg Hx     Diabetes Neg Hx     Hypertension Neg Hx     Stroke Neg Hx     Cervical cancer Neg Hx     Endometrial cancer Neg Hx     Vaginal cancer Neg Hx      OB History    Para Term  AB Living   6 4 3 1 2 3   SAB IAB Ectopic Multiple Live Births   2       3      # Outcome Date GA Lbr Tj/2nd Weight Sex Delivery Anes PTL Lv   6 Term            5 SAB            4 SAB            3 Term  38w0d    Vag-Spont   YORDY   2 Term  37w0d    Vag-Spont   YORDY   1   35w0d    Vag-Spont   YORDY       Current Outpatient Medications:     albuterol (PROVENTIL/VENTOLIN HFA) 90 mcg/actuation inhaler, Inhale 2 puffs into the lungs every 6 (six) hours as needed., Disp: , Rfl:     amoxicillin (AMOXIL) 500 MG capsule, Take 1,000 mg by mouth 3 (three) times daily., Disp: , Rfl:     azelastine (ASTELIN) 137 mcg (0.1 %) nasal spray, 2 sprays (274 mcg total) by Nasal route 2 (two) times daily., Disp: 30 mL, Rfl: 0    azithromycin (Z-THELMA) 250 MG tablet, Take 250 mg by mouth., Disp: , Rfl:     estradioL (VIVELLE-DOT) 0.025 mg/24 hr, Place 1 patch onto the skin twice a week., Disp: 8 patch, Rfl: 11    fluticasone propionate (FLONASE) 50 mcg/actuation nasal spray, 2 sprays (100 mcg total) by Each Nostril route once daily., Disp: 15 g, Rfl: 0    methen-m.blue-s.phos-phsal-hyo (URIBEL) 118-10-40.8-36 mg Cap, Take 1 capsule by mouth 4 (four) times daily as needed (bladder pain)., Disp: 120 capsule, Rfl: 11    methylPREDNISolone (MEDROL DOSEPACK) 4 mg tablet, Take 1 tablet by mouth once daily., Disp: , Rfl:     omega-3 fatty acids 1,000 mg Cap, Take 2 g by mouth., Disp: , Rfl:     progesterone (PROMETRIUM) 200 MG capsule, Take 1 capsule (200 mg total) by mouth nightly., Disp: 30 capsule, Rfl: 11    UNABLE TO FIND, medication name: Calm, Disp: , Rfl:     URIBEL 118-10-40.8-36  mg Cap, TAKE 1 CAPSULE BY MOUTH FOUR TIMES DAILY AS NEEDED, Disp: 120 capsule, Rfl: 3    vibegron (GEMTESA) 75 mg Tab, Take 75 mg by mouth once daily., Disp: 30 tablet, Rfl: 11    There were no vitals filed for this visit.  There is no height or weight on file to calculate BMI.       Assessment:    Menopausal symptoms        Plan:     Risks and benefits of hormone replacement therapy were discussed.  Hormone replacement therapy options, including bioidentical versus non-bioidentical hormones, as well as alternatives discussed.    Instructed to follow current hormone therapy regimen as most effective with absence negative side effects.     Follow up in 3 months.  Instructed patient to call if she experiences any side effects or has any questions.       LILIANE Norris    The patient location is: [place]  The chief complaint leading to consultation is: HRT FU    Visit type: audiovisual    Face to Face time with patient: 20 minutes  20 minutes of total time spent on the encounter, which includes face to face time and non-face to face time preparing to see the patient (eg, review of tests), Obtaining and/or reviewing separately obtained history, Documenting clinical information in the electronic or other health record, Independently interpreting results (not separately reported) and communicating results to the patient/family/caregiver, or Care coordination (not separately reported).     Each patient to whom he or she provides medical services by telemedicine is:  (1) informed of the relationship between the physician and patient and the respective role of any other health care provider with respect to management of the patient; and (2) notified that he or she may decline to receive medical services by telemedicine and may withdraw from such care at any time.

## 2023-10-25 ENCOUNTER — PATIENT MESSAGE (OUTPATIENT)
Dept: OBSTETRICS AND GYNECOLOGY | Facility: CLINIC | Age: 58
End: 2023-10-25
Payer: COMMERCIAL

## 2023-11-10 ENCOUNTER — PATIENT MESSAGE (OUTPATIENT)
Dept: UROGYNECOLOGY | Facility: CLINIC | Age: 58
End: 2023-11-10
Payer: COMMERCIAL

## 2023-11-10 DIAGNOSIS — R39.15 URINARY URGENCY: ICD-10-CM

## 2023-11-10 DIAGNOSIS — N30.10 IC (INTERSTITIAL CYSTITIS): Primary | ICD-10-CM

## 2023-11-16 ENCOUNTER — PATIENT MESSAGE (OUTPATIENT)
Dept: UROGYNECOLOGY | Facility: CLINIC | Age: 58
End: 2023-11-16
Payer: COMMERCIAL

## 2023-11-16 DIAGNOSIS — N30.10 IC (INTERSTITIAL CYSTITIS): Primary | ICD-10-CM

## 2023-11-16 DIAGNOSIS — R39.9 UTI SYMPTOMS: ICD-10-CM

## 2023-11-17 ENCOUNTER — LAB VISIT (OUTPATIENT)
Dept: LAB | Facility: HOSPITAL | Age: 58
End: 2023-11-17
Attending: INTERNAL MEDICINE
Payer: COMMERCIAL

## 2023-11-17 DIAGNOSIS — N30.10 IC (INTERSTITIAL CYSTITIS): ICD-10-CM

## 2023-11-17 DIAGNOSIS — R39.9 UTI SYMPTOMS: ICD-10-CM

## 2023-11-17 LAB
AMORPH CRY UR QL COMP ASSIST: ABNORMAL
BACTERIA #/AREA URNS AUTO: ABNORMAL /HPF
BILIRUB UR QL STRIP: NEGATIVE
CLARITY UR REFRACT.AUTO: ABNORMAL
COLOR UR AUTO: YELLOW
GLUCOSE UR QL STRIP: NEGATIVE
HGB UR QL STRIP: NEGATIVE
KETONES UR QL STRIP: NEGATIVE
LEUKOCYTE ESTERASE UR QL STRIP: NEGATIVE
MICROSCOPIC COMMENT: ABNORMAL
NITRITE UR QL STRIP: NEGATIVE
PH UR STRIP: 7 [PH] (ref 5–8)
PROT UR QL STRIP: NEGATIVE
RBC #/AREA URNS AUTO: 3 /HPF (ref 0–4)
SP GR UR STRIP: 1.02 (ref 1–1.03)
SQUAMOUS #/AREA URNS AUTO: 1 /HPF
URN SPEC COLLECT METH UR: ABNORMAL
WBC #/AREA URNS AUTO: 7 /HPF (ref 0–5)

## 2023-11-17 PROCEDURE — 87086 URINE CULTURE/COLONY COUNT: CPT | Performed by: PHYSICIAN ASSISTANT

## 2023-11-17 PROCEDURE — 81001 URINALYSIS AUTO W/SCOPE: CPT | Performed by: PHYSICIAN ASSISTANT

## 2023-11-18 LAB — BACTERIA UR CULT: NO GROWTH

## 2023-11-21 NOTE — PROGRESS NOTES
Bladder Installation 1/6  A #14 red rubber cath inserted into bladder using sterile technique. The urethral meatus was prepped with betadine prior to cath insertion x 2.  10 cc yellow colored urine drained from bladder. A solution of 0.5% bupivacaine 20 mL, heparin 10,000 IU/ml 10 mL, kenalog 40 mg, and lasltly sodium bicarb 4.2% (2.5 mEq) was instilled into bladder without difficulty. Procedure was tolerated very well. She was instructed to hold urine for one hour before voiding.

## 2023-11-22 ENCOUNTER — PROCEDURE VISIT (OUTPATIENT)
Dept: UROGYNECOLOGY | Facility: CLINIC | Age: 58
End: 2023-11-22
Payer: COMMERCIAL

## 2023-11-22 VITALS
SYSTOLIC BLOOD PRESSURE: 100 MMHG | BODY MASS INDEX: 18.52 KG/M2 | HEIGHT: 65 IN | WEIGHT: 111.13 LBS | DIASTOLIC BLOOD PRESSURE: 62 MMHG

## 2023-11-22 DIAGNOSIS — N30.10 IC (INTERSTITIAL CYSTITIS): Primary | ICD-10-CM

## 2023-11-22 RX ORDER — GABAPENTIN 100 MG/1
100 CAPSULE ORAL 3 TIMES DAILY
Qty: 90 CAPSULE | Refills: 11 | Status: SHIPPED | OUTPATIENT
Start: 2023-11-22 | End: 2024-11-21

## 2023-11-22 RX ORDER — TRIAMCINOLONE ACETONIDE 40 MG/ML
40 INJECTION, SUSPENSION INTRA-ARTICULAR; INTRAMUSCULAR
Status: COMPLETED | OUTPATIENT
Start: 2023-11-22 | End: 2023-12-20

## 2023-11-22 RX ORDER — SODIUM BICARBONATE 42 MG/ML
10 INJECTION, SOLUTION INTRAVENOUS ONCE
Status: COMPLETED | OUTPATIENT
Start: 2023-11-22 | End: 2023-12-20

## 2023-11-22 RX ORDER — MIRABEGRON 50 MG/1
1 TABLET, FILM COATED, EXTENDED RELEASE ORAL DAILY
Qty: 30 TABLET | Refills: 11 | Status: SHIPPED | OUTPATIENT
Start: 2023-11-22 | End: 2024-11-21

## 2023-11-22 RX ORDER — BUPIVACAINE HYDROCHLORIDE 5 MG/ML
20 INJECTION, SOLUTION PERINEURAL
Status: COMPLETED | OUTPATIENT
Start: 2023-11-22 | End: 2023-12-06

## 2023-11-22 NOTE — PATIENT INSTRUCTIONS
1)  IC  --Empty bladder every 3 hours.  Empty well: wait a minute, lean forward on toilet.    --Avoid dietary irritants (see sheet).  Keep diary x 3-5 days to determine your irritants.    --URGE: oxybutynin, trospium causde constipation. Uses uribel intermittently.  Use uribel post coital and any time you are having urgency.  --restart myrbetriq 50 mg daily  For flares:      -- Uribel up to 4 times daily as needed for bladder pain      -- Start gabapentin 100 mg up to 3 times daily as needed for bladder pain      -- Start vaginal suppositories as needed for bladder pain. Sent to [facility] in [place].   --Recommend Prelief to help alkinize the urine:              --Prelief Tablets : Each tablet contains 345 mg calcium glycerophosphate (65 mg of elemental calcium). The tablets also contain 0.25% magnesium            stearate as a processing aid. Two tablets are equivalent to 690 mg calcium glycerophosphate (130mg of elemental calcium).              --Prelief Powder : Each ¼ teaspoon usage of powder is comparable to two tablets. The powder dissolves rapidly in food or non-alcoholic beverages.             Tablets are recommended for taking with alcoholic beverages              --Usage:                           --Tablets: 2-3 tablets, 2 times a day.                          --Powder: ¼ teaspoon of powder 2 times a day. More can be used when needed  --Hydrodistention With Cystoscopy: allows physicians to take a much closer look at the bladder wall. While the AUA no longer suggests that it be used as a diagnostic method (unless a diagnosis is in doubt), hydrodistention may provide modest relief in IC symptoms which can last from three to six months.   --continue  align     2)Vaginal atrophy (dryness):   --continue intrarosa daily       3)Constipation controlled- continue fiber and calm gummies     4) Pelvic floor tension  --continue pelvic floor PT home exercise program

## 2023-12-06 RX ADMIN — BUPIVACAINE HYDROCHLORIDE 100 MG: 5 INJECTION, SOLUTION PERINEURAL at 03:12

## 2023-12-20 RX ADMIN — SODIUM BICARBONATE 20 ML: 42 INJECTION, SOLUTION INTRAVENOUS at 03:12

## 2023-12-20 RX ADMIN — TRIAMCINOLONE ACETONIDE 40 MG: 40 INJECTION, SUSPENSION INTRA-ARTICULAR; INTRAMUSCULAR at 03:12

## 2023-12-22 ENCOUNTER — OFFICE VISIT (OUTPATIENT)
Dept: UROGYNECOLOGY | Facility: CLINIC | Age: 58
End: 2023-12-22
Payer: COMMERCIAL

## 2023-12-22 VITALS
DIASTOLIC BLOOD PRESSURE: 65 MMHG | SYSTOLIC BLOOD PRESSURE: 133 MMHG | BODY MASS INDEX: 18.47 KG/M2 | WEIGHT: 110.88 LBS | HEIGHT: 65 IN | HEART RATE: 67 BPM

## 2023-12-22 DIAGNOSIS — N30.10 IC (INTERSTITIAL CYSTITIS): Primary | ICD-10-CM

## 2023-12-22 DIAGNOSIS — N95.2 VAGINAL ATROPHY: ICD-10-CM

## 2023-12-22 DIAGNOSIS — R39.15 URINARY URGENCY: ICD-10-CM

## 2023-12-22 DIAGNOSIS — R39.9 UTI SYMPTOMS: ICD-10-CM

## 2023-12-22 LAB
BACTERIA #/AREA URNS AUTO: ABNORMAL /HPF
BILIRUB SERPL-MCNC: ABNORMAL MG/DL
BLOOD URINE, POC: ABNORMAL
CLARITY, POC UA: CLEAR
COLOR, POC UA: ABNORMAL
GLUCOSE UR QL STRIP: ABNORMAL
KETONES UR QL STRIP: ABNORMAL
LEUKOCYTE ESTERASE URINE, POC: ABNORMAL
MICROSCOPIC COMMENT: ABNORMAL
NITRITE, POC UA: ABNORMAL
PH, POC UA: 5
PROTEIN, POC: ABNORMAL
RBC #/AREA URNS AUTO: 2 /HPF (ref 0–4)
SPECIFIC GRAVITY, POC UA: 1.01
SQUAMOUS #/AREA URNS AUTO: 0 /HPF
UROBILINOGEN, POC UA: ABNORMAL
WBC #/AREA URNS AUTO: 13 /HPF (ref 0–5)

## 2023-12-22 PROCEDURE — 81002 POCT URINE DIPSTICK WITHOUT MICROSCOPE: ICD-10-PCS | Mod: S$GLB,,, | Performed by: NURSE PRACTITIONER

## 2023-12-22 PROCEDURE — 99999 PR PBB SHADOW E&M-EST. PATIENT-LVL IV: ICD-10-PCS | Mod: PBBFAC,,, | Performed by: NURSE PRACTITIONER

## 2023-12-22 PROCEDURE — 99213 PR OFFICE/OUTPT VISIT, EST, LEVL III, 20-29 MIN: ICD-10-PCS | Mod: 25,S$GLB,, | Performed by: NURSE PRACTITIONER

## 2023-12-22 PROCEDURE — 3008F PR BODY MASS INDEX (BMI) DOCUMENTED: ICD-10-PCS | Mod: CPTII,S$GLB,, | Performed by: NURSE PRACTITIONER

## 2023-12-22 PROCEDURE — 3008F BODY MASS INDEX DOCD: CPT | Mod: CPTII,S$GLB,, | Performed by: NURSE PRACTITIONER

## 2023-12-22 PROCEDURE — 3078F DIAST BP <80 MM HG: CPT | Mod: CPTII,S$GLB,, | Performed by: NURSE PRACTITIONER

## 2023-12-22 PROCEDURE — 99213 OFFICE O/P EST LOW 20 MIN: CPT | Mod: 25,S$GLB,, | Performed by: NURSE PRACTITIONER

## 2023-12-22 PROCEDURE — 1160F PR REVIEW ALL MEDS BY PRESCRIBER/CLIN PHARMACIST DOCUMENTED: ICD-10-PCS | Mod: CPTII,S$GLB,, | Performed by: NURSE PRACTITIONER

## 2023-12-22 PROCEDURE — 3075F PR MOST RECENT SYSTOLIC BLOOD PRESS GE 130-139MM HG: ICD-10-PCS | Mod: CPTII,S$GLB,, | Performed by: NURSE PRACTITIONER

## 2023-12-22 PROCEDURE — 1159F MED LIST DOCD IN RCRD: CPT | Mod: CPTII,S$GLB,, | Performed by: NURSE PRACTITIONER

## 2023-12-22 PROCEDURE — 1159F PR MEDICATION LIST DOCUMENTED IN MEDICAL RECORD: ICD-10-PCS | Mod: CPTII,S$GLB,, | Performed by: NURSE PRACTITIONER

## 2023-12-22 PROCEDURE — 3075F SYST BP GE 130 - 139MM HG: CPT | Mod: CPTII,S$GLB,, | Performed by: NURSE PRACTITIONER

## 2023-12-22 PROCEDURE — 87086 URINE CULTURE/COLONY COUNT: CPT | Performed by: NURSE PRACTITIONER

## 2023-12-22 PROCEDURE — 51701 INSERT BLADDER CATHETER: CPT | Mod: S$GLB,,, | Performed by: NURSE PRACTITIONER

## 2023-12-22 PROCEDURE — 1160F RVW MEDS BY RX/DR IN RCRD: CPT | Mod: CPTII,S$GLB,, | Performed by: NURSE PRACTITIONER

## 2023-12-22 PROCEDURE — 81002 URINALYSIS NONAUTO W/O SCOPE: CPT | Mod: S$GLB,,, | Performed by: NURSE PRACTITIONER

## 2023-12-22 PROCEDURE — 3078F PR MOST RECENT DIASTOLIC BLOOD PRESSURE < 80 MM HG: ICD-10-PCS | Mod: CPTII,S$GLB,, | Performed by: NURSE PRACTITIONER

## 2023-12-22 PROCEDURE — 51701 PR INSERTION OF NON-INDWELLING BLADDER CATHETERIZATION FOR RESIDUAL UR: ICD-10-PCS | Mod: S$GLB,,, | Performed by: NURSE PRACTITIONER

## 2023-12-22 PROCEDURE — 81001 URINALYSIS AUTO W/SCOPE: CPT | Performed by: NURSE PRACTITIONER

## 2023-12-22 PROCEDURE — 99999 PR PBB SHADOW E&M-EST. PATIENT-LVL IV: CPT | Mod: PBBFAC,,, | Performed by: NURSE PRACTITIONER

## 2023-12-22 RX ORDER — CEPHALEXIN 500 MG/1
500 CAPSULE ORAL 2 TIMES DAILY
Qty: 14 CAPSULE | Refills: 0 | Status: SHIPPED | OUTPATIENT
Start: 2023-12-22 | End: 2023-12-29

## 2023-12-22 NOTE — PROGRESS NOTES
Urogyn follow up  12/22/2023  .  [facility] - UROGYNECOLOGY  [address] [address]    Tara Unger  [id number]  1965      Tara Unger is a  here for a urogyn follow up for dysuria.    12/8/14  Title of Operation:  1) Total laparoscopic hysterectomy  2) Bilateral laparoscopic salpingectomy  3) Bilateral laparoscopic uterosacral suspension  4) Cystourethroscopy  5) Posterior repair with perineorrhaphy  6) Intraoperative radiology films for incorrect count: positive for needle in pelvis, removed laparoscopically, repeat film negative and count correct.      Indications for Surgery:  1) UI: (+) UVALDO only if bladder really full and sneezes, not with exercise. UVALDO not daily. (--) pads, no underwear changes. Daytime frequency: Q 2 hours. Nocturia: Yes: 1/night, around 4 AM. (--) dysuria, (--) hematuria, (--) frequent UTIs. Has had 1-2 mild UTIs. (+) complete bladder emptying.  --UF prolonged but normal otherwise. PF incomplete void until catheters removed. Compliance normal. Max capacity >400 mL. DO (--). UVALDO (--).   2) POP: Absent. (--) vaginal bleeding. +nl menses on OCP. (--) vaginal discharge. (+) sexually active. (--) dyspareunia. (+) Vaginal dryness. (--) vaginal estrogen use.   --POP-Q: Aa -1; Ba -1; C -6; Ap 0; Bp 0 (very distal rectocele); D -9. Genital hiatus 3, perineal body 3, total vaginal length 10.  3) BM: (+) constipation/straining. (--) chronic diarrhea. (--) hematochezia. (--) fecal incontinence. (--) fecal smearing/urgency. (+) incomplete evacuation. No splinting.   4) ? IC: Dx last year per Dr. Labadie at [facility]. No cysto. Was counseled to eliminate exacerbating foods--did seem to help. Seems worse after on feet all day or with stress. Sx: frequency, urgency, feels some burning at the end of void, difficult evacuation. +relief with emptying. Still + menses--on OCP due to frequent menses. No correlation with menses. May have some flare with intercourse. Pain with  flare: 6/10 only with urination, usually lasts x several weeks but gradually improves. Flares occur Q 2-3 months. Does have some anxiety. No previous treatment.   5) Patient desires ovarian preservation.      Preoperative Diagnosis:  1) Cystocele  2) Rectocele  3) Dysfunctional uterine bleeding  4) Urinary urgency/frequency  5) Concern for interstitial cystitis  6) Chronic constipation  7) Defecatory dysfunction  8) Mixed urinary incontinence     Postoperative Diagnosis:  1) Cystocele  2) Rectocele  3) Dysfunctional uterine bleeding  4) Urinary urgency/frequency  5) Concern for interstitial cystitis  6) Chronic constipation  7) Defecatory dysfunction  8) Mixed urinary incontinence      Change since last visit:  Complains of dysuria and bladder pain.    Has been been going to pelvic floor PT  Myrbetriq was $500---did not fill  Uribel has not been helping burning, taking 3-4 times daily  Using intrarosa  Vaginal valium/lidocaine/baclofen has not been helping      Past Medical History:   Diagnosis Date    Urinary tract infection     Uterine fibroid        Past Surgical History:   Procedure Laterality Date    HYSTERECTOMY      2014    PELVIC LAPAROSCOPY      uterine septum repair    RHINOPLASTY TIP  1980    TUBAL LIGATION  December 1999    Postpartum    umbilical cyst  2004       Family History   Problem Relation Age of Onset    Breast cancer Maternal Cousin     Ovarian cancer Maternal Cousin     Colon cancer Neg Hx     Diabetes Neg Hx     Hypertension Neg Hx     Stroke Neg Hx     Cervical cancer Neg Hx     Endometrial cancer Neg Hx     Vaginal cancer Neg Hx        Social History     Socioeconomic History    Marital status:    Tobacco Use    Smoking status: Never    Smokeless tobacco: Never   Substance and Sexual Activity    Alcohol use: No     Comment: rare    Drug use: No    Sexual activity: Yes     Partners: Male     Birth control/protection: Surgical       Current Outpatient Medications   Medication Sig  "Dispense Refill    estradioL (VIVELLE-DOT) 0.025 mg/24 hr Place 1 patch onto the skin twice a week. 8 patch 11    fluticasone propionate (FLONASE) 50 mcg/actuation nasal spray 2 sprays (100 mcg total) by Each Nostril route once daily. 15 g 0    gabapentin (NEURONTIN) 100 MG capsule Take 1 capsule (100 mg total) by mouth 3 (three) times daily. 90 capsule 11    LIDOCAINE 2 %, VALIUM 5 MG, BACLOFEN 4 % SUPPOSITORY Place 1 suppository vaginally 2 (two) times daily as needed (bladder pain). 10 each 5    methen-m.blue-s.phos-phsal-hyo (URIBEL) 118-10-40.8-36 mg Cap Take 1 capsule by mouth 4 (four) times daily as needed (bladder pain). 120 capsule 11    mirabegron (MYRBETRIQ) 50 mg Tb24 Take 1 tablet (50 mg total) by mouth once daily. 30 tablet 11    omega-3 fatty acids 1,000 mg Cap Take 2 g by mouth.      UNABLE TO FIND medication name: Calm      URIBEL 118-10-40.8-36 mg Cap TAKE 1 CAPSULE BY MOUTH FOUR TIMES DAILY AS NEEDED 120 capsule 3    cephALEXin (KEFLEX) 500 MG capsule Take 1 capsule (500 mg total) by mouth 2 (two) times daily. for 7 days 14 capsule 0     No current facility-administered medications for this visit.       Review of patient's allergies indicates:  No Known Allergies    Well woman:  Pap:02/2013 normal- patient is post hyst- no further screening needed  Mammo:05/19/2023- normal  Colonoscopy:07/29/2016 normal- repeat in 10 years  Dexa:2014 normal per patient report       ROS:  As per HPI.      Exam  /65 (BP Location: Left arm, Patient Position: Sitting, BP Method: Small (Automatic))   Pulse 67   Ht 5' 5" (1.651 m)   Wt 50.3 kg (110 lb 14.3 oz)   LMP 11/30/2014   BMI 18.45 kg/m²   General: alert and oriented, no acute distress  Respiratory: normal respiratory effort  Abd: soft, non-tender, non-distended    Pelvic  Ext. Genitalia: normal external genitalia. Normal bartholin's and skeens glands  Vagina: + atrophy. Normal vaginal mucosa without lesions. No discharge noted.   Non-tender bladder " base without palpable mass.  Rest of pelvic deferred    Cath urine specimen    Impression  1. IC (interstitial cystitis)  POCT URINE DIPSTICK WITHOUT MICROSCOPE      2. Urinary urgency  Urinalysis Microscopic    Urine culture    cephALEXin (KEFLEX) 500 MG capsule      3. Vaginal atrophy        4. UTI symptoms          We reviewed the above issues and discussed options for short-term versus long-term management of her problems.   Plan:   1)  IC  --Empty bladder every 3 hours.  Empty well: wait a minute, lean forward on toilet.    --Avoid dietary irritants (see sheet).  Keep diary x 3-5 days to determine your irritants.    --URGE: oxybutynin, trospium caused constipation. Myrbetriq > $500.  Uses uribel intermittently.  Use uribel post coital and any time you are having urgency.  --Recommend Prelief to help alkinize the urine:              --Prelief Tablets : Each tablet contains 345 mg calcium glycerophosphate (65 mg of elemental calcium). The tablets also contain 0.25% magnesium            stearate as a processing aid. Two tablets are equivalent to 690 mg calcium glycerophosphate (130mg of elemental calcium).              --Prelief Powder : Each ¼ teaspoon usage of powder is comparable to two tablets. The powder dissolves rapidly in food or non-alcoholic beverages.             Tablets are recommended for taking with alcoholic beverages              --Usage:                           --Tablets: 2-3 tablets, 2 times a day.                          --Powder: ¼ teaspoon of powder 2 times a day. More can be used when needed  --Hydrodistention With Cystoscopy: allows physicians to take a much closer look at the bladder wall. While the AUA no longer suggests that it be used as a diagnostic method (unless a diagnosis is in doubt), hydrodistention may provide modest relief in IC symptoms which can last from three to six months.   --continue  align  --myrbetriq coupon [place]SU[place] to 22068         2)Vaginal atrophy (dryness):    --continue intrarosa daily          3)Constipation controlled- continue fiber and calm gummies     4)pelvic floor tension  --continue pelvic floor PT home exercise program     5)concern for uti  --urine micro/culture today  --keflex 500 mg bid x 7 days ppx    6)RTC pending results    I spent a total of 20 minutes on the day of the visit.  This includes face to face time and non-face to face time preparing to see the patient (eg, review of tests), obtaining and/or reviewing separately obtained history, documenting clinical information in the electronic or other health record, independently interpreting results and communicating results to the patient/family/caregiver, or care coordinator.     Leona Broussard, GILDARDO-BC  [facility]  Division of Female Pelvic Medicine and Reconstructive Surgery  Department of Obstetrics & Gynecology

## 2023-12-22 NOTE — PATIENT INSTRUCTIONS
1)  IC  --Empty bladder every 3 hours.  Empty well: wait a minute, lean forward on toilet.    --Avoid dietary irritants (see sheet).  Keep diary x 3-5 days to determine your irritants.    --URGE: oxybutynin, trospium caused constipation. Myrbetriq > $500.  Uses uribel intermittently.  Use uribel post coital and any time you are having urgency.  --Recommend Prelief to help alkinize the urine:              --Prelief Tablets : Each tablet contains 345 mg calcium glycerophosphate (65 mg of elemental calcium). The tablets also contain 0.25% magnesium            stearate as a processing aid. Two tablets are equivalent to 690 mg calcium glycerophosphate (130mg of elemental calcium).              --Prelief Powder : Each ¼ teaspoon usage of powder is comparable to two tablets. The powder dissolves rapidly in food or non-alcoholic beverages.             Tablets are recommended for taking with alcoholic beverages              --Usage:                           --Tablets: 2-3 tablets, 2 times a day.                          --Powder: ¼ teaspoon of powder 2 times a day. More can be used when needed  --Hydrodistention With Cystoscopy: allows physicians to take a much closer look at the bladder wall. While the AUA no longer suggests that it be used as a diagnostic method (unless a diagnosis is in doubt), hydrodistention may provide modest relief in IC symptoms which can last from three to six months.   --continue  align  --myrbetriq coupon Text ICSOM to 15219         2)Vaginal atrophy (dryness):   --continue intrarosa daily          3)Constipation controlled- continue fiber and calm gummies     4)pelvic floor tension  --continue pelvic floor PT home exercise program     5)concern for uti  --urine micro/culture today  --keflex 500 mg bid x 7 days ppx    6)RTC pending results

## 2023-12-23 LAB — BACTERIA UR CULT: NO GROWTH

## 2023-12-26 ENCOUNTER — PATIENT MESSAGE (OUTPATIENT)
Dept: UROGYNECOLOGY | Facility: CLINIC | Age: 58
End: 2023-12-26
Payer: COMMERCIAL

## 2023-12-29 ENCOUNTER — PROCEDURE VISIT (OUTPATIENT)
Dept: UROGYNECOLOGY | Facility: CLINIC | Age: 58
End: 2023-12-29
Payer: COMMERCIAL

## 2023-12-29 VITALS
WEIGHT: 112.44 LBS | BODY MASS INDEX: 18.71 KG/M2 | SYSTOLIC BLOOD PRESSURE: 103 MMHG | DIASTOLIC BLOOD PRESSURE: 65 MMHG

## 2023-12-29 DIAGNOSIS — N30.10 IC (INTERSTITIAL CYSTITIS): Primary | ICD-10-CM

## 2023-12-29 DIAGNOSIS — R39.15 URINARY URGENCY: ICD-10-CM

## 2023-12-29 DIAGNOSIS — R30.0 DYSURIA: ICD-10-CM

## 2023-12-29 PROCEDURE — 51700 IRRIGATION OF BLADDER: CPT | Mod: S$GLB,,, | Performed by: NURSE PRACTITIONER

## 2023-12-29 PROCEDURE — 51700 PR IRRIGATION, BLADDER: ICD-10-PCS | Mod: S$GLB,,, | Performed by: NURSE PRACTITIONER

## 2023-12-29 RX ORDER — SODIUM BICARBONATE 42 MG/ML
2.5 INJECTION, SOLUTION INTRAVENOUS ONCE
Status: COMPLETED | OUTPATIENT
Start: 2023-12-29 | End: 2023-12-29

## 2023-12-29 RX ORDER — BUPIVACAINE HYDROCHLORIDE 5 MG/ML
20 INJECTION, SOLUTION EPIDURAL; INTRACAUDAL
Status: COMPLETED | OUTPATIENT
Start: 2023-12-29 | End: 2023-12-29

## 2023-12-29 RX ORDER — TRIAMCINOLONE ACETONIDE 40 MG/ML
40 INJECTION, SUSPENSION INTRA-ARTICULAR; INTRAMUSCULAR ONCE
Status: COMPLETED | OUTPATIENT
Start: 2023-12-29 | End: 2023-12-29

## 2023-12-29 RX ORDER — HEPARIN SODIUM 1000 [USP'U]/ML
10000 INJECTION, SOLUTION INTRAVENOUS; SUBCUTANEOUS ONCE
Status: COMPLETED | OUTPATIENT
Start: 2023-12-29 | End: 2023-12-29

## 2023-12-29 RX ADMIN — TRIAMCINOLONE ACETONIDE 40 MG: 40 INJECTION, SUSPENSION INTRA-ARTICULAR; INTRAMUSCULAR at 09:12

## 2023-12-29 RX ADMIN — SODIUM BICARBONATE 5 ML: 42 INJECTION, SOLUTION INTRAVENOUS at 09:12

## 2023-12-29 RX ADMIN — HEPARIN SODIUM 10000 UNITS: 1000 INJECTION, SOLUTION INTRAVENOUS; SUBCUTANEOUS at 09:12

## 2023-12-29 RX ADMIN — BUPIVACAINE HYDROCHLORIDE 100 MG: 5 INJECTION, SOLUTION EPIDURAL; INTRACAUDAL at 09:12

## 2023-12-29 NOTE — PROCEDURES
Procedures    A #14 red rubber cath inserted into bladder using sterile technique. The urethral meatus was prepped with betadine prior to cath insertion.  50 cc clear green urine drained from bladder.  A solution of  5% marcaine 20 mL, heparin 10,000 IU/ml 10 mL, kenalog 40 mg, and sodium bicarb 4.2% (2.5 mEq) was instilled into bladder without difficulty. Procedure was tolerated very well. She was instructed to hold urine for one hour before voiding. GILDARDO Mccarthy-BC

## 2024-01-03 DIAGNOSIS — N95.2 VAGINAL ATROPHY: ICD-10-CM

## 2024-01-04 RX ORDER — PRASTERONE 6.5 MG/1
INSERT VAGINAL
Qty: 28 EACH | Refills: 11 | Status: SHIPPED | OUTPATIENT
Start: 2024-01-04

## 2024-01-05 ENCOUNTER — CLINICAL SUPPORT (OUTPATIENT)
Dept: UROGYNECOLOGY | Facility: CLINIC | Age: 59
End: 2024-01-05
Payer: COMMERCIAL

## 2024-01-05 DIAGNOSIS — N30.10 IC (INTERSTITIAL CYSTITIS): Primary | ICD-10-CM

## 2024-01-05 LAB
BILIRUB SERPL-MCNC: NEGATIVE MG/DL
BLOOD URINE, POC: NEGATIVE
CLARITY, POC UA: CLEAR
COLOR, POC UA: NORMAL
GLUCOSE UR QL STRIP: NORMAL
KETONES UR QL STRIP: NEGATIVE
LEUKOCYTE ESTERASE URINE, POC: NEGATIVE
NITRITE, POC UA: NEGATIVE
PH, POC UA: 5
PROTEIN, POC: NEGATIVE
SPECIFIC GRAVITY, POC UA: NORMAL
UROBILINOGEN, POC UA: NORMAL

## 2024-01-05 PROCEDURE — 81002 URINALYSIS NONAUTO W/O SCOPE: CPT | Mod: S$GLB,,, | Performed by: NURSE PRACTITIONER

## 2024-01-05 PROCEDURE — 51700 IRRIGATION OF BLADDER: CPT | Mod: S$GLB,,, | Performed by: NURSE PRACTITIONER

## 2024-01-05 PROCEDURE — 99999 PR PBB SHADOW E&M-EST. PATIENT-LVL I: CPT | Mod: PBBFAC,,,

## 2024-01-05 RX ORDER — BUPIVACAINE HYDROCHLORIDE 5 MG/ML
10 INJECTION, SOLUTION EPIDURAL; INTRACAUDAL
Status: COMPLETED | OUTPATIENT
Start: 2024-01-05 | End: 2024-01-05

## 2024-01-05 RX ORDER — SODIUM BICARBONATE 42 MG/ML
2.5 INJECTION, SOLUTION INTRAVENOUS
Status: COMPLETED | OUTPATIENT
Start: 2024-01-05 | End: 2024-01-05

## 2024-01-05 RX ORDER — TRIAMCINOLONE ACETONIDE 40 MG/ML
40 INJECTION, SUSPENSION INTRA-ARTICULAR; INTRAMUSCULAR
Status: COMPLETED | OUTPATIENT
Start: 2024-01-05 | End: 2024-01-05

## 2024-01-05 RX ADMIN — TRIAMCINOLONE ACETONIDE 40 MG: 40 INJECTION, SUSPENSION INTRA-ARTICULAR; INTRAMUSCULAR at 09:01

## 2024-01-05 RX ADMIN — BUPIVACAINE HYDROCHLORIDE 50 MG: 5 INJECTION, SOLUTION EPIDURAL; INTRACAUDAL at 09:01

## 2024-01-05 RX ADMIN — SODIUM BICARBONATE 5 ML: 42 INJECTION, SOLUTION INTRAVENOUS at 09:01

## 2024-01-05 NOTE — PROGRESS NOTES
Instillation 2    A #14 red rubber cath inserted into bladder using sterile technique. The urethral meatus was prepped with betadine prior to cath insertion.  5 cc clear green urine drained from bladder.  A solution of  5% marcaine 20 mL, heparin 10,000 IU/ml 10 mL, kenalog 40 mg, and sodium bicarb 4.2% (2.5 mEq) was instilled into bladder without difficulty. Procedure was tolerated very well. She was instructed to hold urine for one hour before voiding. Reports improvement in symptoms from last instillation.

## 2024-01-12 ENCOUNTER — CLINICAL SUPPORT (OUTPATIENT)
Dept: UROGYNECOLOGY | Facility: CLINIC | Age: 59
End: 2024-01-12
Payer: COMMERCIAL

## 2024-01-12 DIAGNOSIS — N30.10 IC (INTERSTITIAL CYSTITIS): Primary | ICD-10-CM

## 2024-01-12 LAB
BILIRUB SERPL-MCNC: NEGATIVE MG/DL
BLOOD URINE, POC: NEGATIVE
CLARITY, POC UA: CLEAR
COLOR, POC UA: NORMAL
GLUCOSE UR QL STRIP: NEGATIVE
KETONES UR QL STRIP: NEGATIVE
LEUKOCYTE ESTERASE URINE, POC: NEGATIVE
NITRITE, POC UA: NEGATIVE
PH, POC UA: 7
PROTEIN, POC: NORMAL
SPECIFIC GRAVITY, POC UA: 1.01
UROBILINOGEN, POC UA: NORMAL

## 2024-01-12 PROCEDURE — 51700 IRRIGATION OF BLADDER: CPT | Mod: S$GLB,,, | Performed by: PHYSICIAN ASSISTANT

## 2024-01-12 PROCEDURE — 81002 URINALYSIS NONAUTO W/O SCOPE: CPT | Mod: S$GLB,,, | Performed by: PHYSICIAN ASSISTANT

## 2024-01-12 PROCEDURE — 99999 PR PBB SHADOW E&M-EST. PATIENT-LVL I: CPT | Mod: PBBFAC,,,

## 2024-01-12 RX ORDER — BUPIVACAINE HYDROCHLORIDE 5 MG/ML
10 INJECTION, SOLUTION EPIDURAL; INTRACAUDAL
Status: COMPLETED | OUTPATIENT
Start: 2024-01-12 | End: 2024-01-12

## 2024-01-12 RX ORDER — TRIAMCINOLONE ACETONIDE 40 MG/ML
40 INJECTION, SUSPENSION INTRA-ARTICULAR; INTRAMUSCULAR
Status: COMPLETED | OUTPATIENT
Start: 2024-01-12 | End: 2024-01-12

## 2024-01-12 RX ORDER — SODIUM BICARBONATE 42 MG/ML
2.5 INJECTION, SOLUTION INTRAVENOUS
Status: COMPLETED | OUTPATIENT
Start: 2024-01-12 | End: 2024-01-12

## 2024-01-12 RX ADMIN — TRIAMCINOLONE ACETONIDE 40 MG: 40 INJECTION, SUSPENSION INTRA-ARTICULAR; INTRAMUSCULAR at 09:01

## 2024-01-12 RX ADMIN — BUPIVACAINE HYDROCHLORIDE 50 MG: 5 INJECTION, SOLUTION EPIDURAL; INTRACAUDAL at 09:01

## 2024-01-12 RX ADMIN — SODIUM BICARBONATE 5 ML: 42 INJECTION, SOLUTION INTRAVENOUS at 09:01

## 2024-01-12 NOTE — PROGRESS NOTES
Instillation 3     A #14 red rubber cath inserted into bladder using sterile technique. The urethral meatus was prepped with betadine prior to cath insertion.  10 cc clear  urine drained from bladder.  A solution of  5% marcaine 20 mL, heparin 10,000 IU/ml 10 mL, kenalog 40 mg, and sodium bicarb 4.2% (2.5 mEq) was instilled into bladder without difficulty. Procedure was tolerated very well. She was instructed to hold urine for one hour before voiding.

## 2024-01-19 ENCOUNTER — CLINICAL SUPPORT (OUTPATIENT)
Dept: UROGYNECOLOGY | Facility: CLINIC | Age: 59
End: 2024-01-19
Payer: COMMERCIAL

## 2024-01-19 DIAGNOSIS — N30.10 IC (INTERSTITIAL CYSTITIS): Primary | ICD-10-CM

## 2024-01-19 LAB
BILIRUB SERPL-MCNC: NEGATIVE MG/DL
BLOOD URINE, POC: NEGATIVE
CLARITY, POC UA: CLEAR
COLOR, POC UA: NORMAL
GLUCOSE UR QL STRIP: NORMAL
KETONES UR QL STRIP: NEGATIVE
LEUKOCYTE ESTERASE URINE, POC: NEGATIVE
NITRITE, POC UA: NEGATIVE
PH, POC UA: 5
PROTEIN, POC: NEGATIVE
SPECIFIC GRAVITY, POC UA: 1.01
UROBILINOGEN, POC UA: NORMAL

## 2024-01-19 PROCEDURE — 51700 IRRIGATION OF BLADDER: CPT | Mod: S$GLB,,, | Performed by: PHYSICIAN ASSISTANT

## 2024-01-19 PROCEDURE — 81002 URINALYSIS NONAUTO W/O SCOPE: CPT | Mod: S$GLB,,, | Performed by: PHYSICIAN ASSISTANT

## 2024-01-19 PROCEDURE — 99999 PR PBB SHADOW E&M-EST. PATIENT-LVL I: CPT | Mod: PBBFAC,,,

## 2024-01-19 RX ORDER — TRIAMCINOLONE ACETONIDE 40 MG/ML
40 INJECTION, SUSPENSION INTRA-ARTICULAR; INTRAMUSCULAR
Status: COMPLETED | OUTPATIENT
Start: 2024-01-19 | End: 2024-01-19

## 2024-01-19 RX ORDER — BUPIVACAINE HYDROCHLORIDE 5 MG/ML
10 INJECTION, SOLUTION EPIDURAL; INTRACAUDAL
Status: COMPLETED | OUTPATIENT
Start: 2024-01-19 | End: 2024-01-19

## 2024-01-19 RX ORDER — LIDOCAINE HYDROCHLORIDE 20 MG/ML
JELLY TOPICAL
Status: COMPLETED | OUTPATIENT
Start: 2024-01-19 | End: 2024-01-19

## 2024-01-19 RX ORDER — SODIUM BICARBONATE 42 MG/ML
2.5 INJECTION, SOLUTION INTRAVENOUS
Status: COMPLETED | OUTPATIENT
Start: 2024-01-19 | End: 2024-01-19

## 2024-01-19 RX ADMIN — TRIAMCINOLONE ACETONIDE 40 MG: 40 INJECTION, SUSPENSION INTRA-ARTICULAR; INTRAMUSCULAR at 09:01

## 2024-01-19 RX ADMIN — SODIUM BICARBONATE 5 ML: 42 INJECTION, SOLUTION INTRAVENOUS at 09:01

## 2024-01-19 RX ADMIN — LIDOCAINE HYDROCHLORIDE: 20 JELLY TOPICAL at 09:01

## 2024-01-19 RX ADMIN — BUPIVACAINE HYDROCHLORIDE 50 MG: 5 INJECTION, SOLUTION EPIDURAL; INTRACAUDAL at 09:01

## 2024-01-19 NOTE — PROGRESS NOTES
Instillation 4/4     A #14 red rubber cath inserted into bladder using sterile technique. The urethral meatus was prepped with betadine x2 prior to cath insertion and lidocaine jelly.  5 cc clear  urine drained from bladder.  A solution of  5% marcaine 20 mL, heparin 10,000 IU/ml 10 mL, kenalog 40 mg, and sodium bicarb 4.2% (2.5 mEq) was instilled into bladder without difficulty. Procedure was tolerated very well. She was instructed to hold urine for one hour before voiding.     Patient will restart Myrbetriq and f/u with Leona.

## 2024-01-24 NOTE — PROGRESS NOTES
Urogyn follow up  01/25/2024  .  [facility] - UROGYNECOLOGY  [address] [address]    Tara Unger  [id number]  1965      Tara Unger is a  here for a urogyn follow up for IC flare s/p bladder instillation x 4.     12/8/14  Title of Operation:  1) Total laparoscopic hysterectomy  2) Bilateral laparoscopic salpingectomy  3) Bilateral laparoscopic uterosacral suspension  4) Cystourethroscopy  5) Posterior repair with perineorrhaphy  6) Intraoperative radiology films for incorrect count: positive for needle in pelvis, removed laparoscopically, repeat film negative and count correct.      Indications for Surgery:  1) UI: (+) UVALDO only if bladder really full and sneezes, not with exercise. UVALDO not daily. (--) pads, no underwear changes. Daytime frequency: Q 2 hours. Nocturia: Yes: 1/night, around 4 AM. (--) dysuria, (--) hematuria, (--) frequent UTIs. Has had 1-2 mild UTIs. (+) complete bladder emptying.  --UF prolonged but normal otherwise. PF incomplete void until catheters removed. Compliance normal. Max capacity >400 mL. DO (--). UVALDO (--).   2) POP: Absent. (--) vaginal bleeding. +nl menses on OCP. (--) vaginal discharge. (+) sexually active. (--) dyspareunia. (+) Vaginal dryness. (--) vaginal estrogen use.   --POP-Q: Aa -1; Ba -1; C -6; Ap 0; Bp 0 (very distal rectocele); D -9. Genital hiatus 3, perineal body 3, total vaginal length 10.  3) BM: (+) constipation/straining. (--) chronic diarrhea. (--) hematochezia. (--) fecal incontinence. (--) fecal smearing/urgency. (+) incomplete evacuation. No splinting.   4) ? IC: Dx last year per Dr. Labadie at [facility]. No cysto. Was counseled to eliminate exacerbating foods--did seem to help. Seems worse after on feet all day or with stress. Sx: frequency, urgency, feels some burning at the end of void, difficult evacuation. +relief with emptying. Still + menses--on OCP due to frequent menses. No correlation with menses. May have some flare  with intercourse. Pain with flare: 6/10 only with urination, usually lasts x several weeks but gradually improves. Flares occur Q 2-3 months. Does have some anxiety. No previous treatment.   5) Patient desires ovarian preservation.      Preoperative Diagnosis:  1) Cystocele  2) Rectocele  3) Dysfunctional uterine bleeding  4) Urinary urgency/frequency  5) Concern for interstitial cystitis  6) Chronic constipation  7) Defecatory dysfunction  8) Mixed urinary incontinence     Postoperative Diagnosis:  1) Cystocele  2) Rectocele  3) Dysfunctional uterine bleeding  4) Urinary urgency/frequency  5) Concern for interstitial cystitis  6) Chronic constipation  7) Defecatory dysfunction  8) Mixed urinary incontinence      12/22/2023  Complains of dysuria and bladder pain.    Has been been going to pelvic floor PT  Myrbetriq was $500---did not fill  Uribel has not been helping burning, taking 3-4 times daily  Using intrarosa  Vaginal valium/lidocaine/baclofen has not been helping    Changes since last visit:  Started myrbetriq 6 days ago.   Urethral burning is improving.   Using intrarosa  Going to pelvic floor PT  Voiding every 2-3 hours during the day --nocturia 1/ night improved from 2/ night  Intermittent hard stool-- not straining  Taking calm powder    Past Medical History:   Diagnosis Date    Urinary tract infection     Uterine fibroid        Past Surgical History:   Procedure Laterality Date    HYSTERECTOMY      2014    PELVIC LAPAROSCOPY      uterine septum repair    RHINOPLASTY TIP  1980    TUBAL LIGATION  December 1999    Postpartum    umbilical cyst  2004       Family History   Problem Relation Age of Onset    Breast cancer Maternal Cousin     Ovarian cancer Maternal Cousin     Colon cancer Neg Hx     Diabetes Neg Hx     Hypertension Neg Hx     Stroke Neg Hx     Cervical cancer Neg Hx     Endometrial cancer Neg Hx     Vaginal cancer Neg Hx        Social History     Socioeconomic History    Marital status:     Tobacco Use    Smoking status: Never    Smokeless tobacco: Never   Substance and Sexual Activity    Alcohol use: No     Comment: rare    Drug use: No    Sexual activity: Yes     Partners: Male     Birth control/protection: Surgical       Current Outpatient Medications   Medication Sig Dispense Refill    estradioL (VIVELLE-DOT) 0.025 mg/24 hr Place 1 patch onto the skin twice a week. 8 patch 11    fluticasone propionate (FLONASE) 50 mcg/actuation nasal spray 2 sprays (100 mcg total) by Each Nostril route once daily. 15 g 0    gabapentin (NEURONTIN) 100 MG capsule Take 1 capsule (100 mg total) by mouth 3 (three) times daily. 90 capsule 11    LIDOCAINE 2 %, VALIUM 5 MG, BACLOFEN 4 % SUPPOSITORY Place 1 suppository vaginally 2 (two) times daily as needed (bladder pain). 10 each 5    methen-m.blue-s.phos-phsal-hyo (URIBEL) 118-10-40.8-36 mg Cap Take 1 capsule by mouth 4 (four) times daily as needed (bladder pain). 120 capsule 11    mirabegron (MYRBETRIQ) 50 mg Tb24 Take 1 tablet (50 mg total) by mouth once daily. 30 tablet 11    omega-3 fatty acids 1,000 mg Cap Take 2 g by mouth.      prasterone, dhea, (INTRAROSA) 6.5 mg Inst INSERT 1 TABLET VAGINALLY ONCE DAILY 28 each 11    UNABLE TO FIND medication name: Calm      UNABLE TO FIND BENZOCAINE/CHLORBUTANOL/GUAIACOL OINTMENT      URIBEL 118-10-40.8-36 mg Cap TAKE 1 CAPSULE BY MOUTH FOUR TIMES DAILY AS NEEDED 120 capsule 3    hydrOXYzine HCL (ATARAX) 10 MG Tab Take 1 tablet (10 mg total) by mouth nightly. 30 tablet 11     No current facility-administered medications for this visit.       Review of patient's allergies indicates:  No Known Allergies    Well woman:  Pap:02/2013 normal- patient is post hyst- no further screening needed  Mammo:05/19/2023- normal  Colonoscopy:07/29/2016 normal- repeat in 10 years  Dexa:2014 normal per patient report       ROS:  As per HPI.      Exam  /70 (BP Location: Right arm, Patient Position: Sitting, BP Method: Small (Automatic))    "Pulse 66   Ht 5' 5" (1.651 m)   Wt 49.9 kg (110 lb 0.2 oz)   LMP 11/30/2014   BMI 18.31 kg/m²   General: alert and oriented, no acute distress  Respiratory: normal respiratory effort  Abd: soft, non-tender, non-distended    Pelvic  Ext. Genitalia: normal external genitalia. Normal bartholin's and skeens glands  Vagina: + atrophy. Normal vaginal mucosa without lesions. No discharge noted.   Non-tender bladder base without palpable mass.  Rest of pelvic deferred        Impression  1. Urinary urgency  POCT URINE DIPSTICK WITHOUT MICROSCOPE      2. Vaginal atrophy        3. Constipation, unspecified constipation type        4. IC (interstitial cystitis)            We reviewed the above issues and discussed options for short-term versus long-term management of her problems.   Plan:   1)  IC  --Empty bladder every 3 hours.  Empty well: wait a minute, lean forward on toilet.    --Avoid dietary irritants (see sheet).  Keep diary x 3-5 days to determine your irritants.    --URGE: oxybutynin, trospium caused constipation. Myrbetriq > $500.  Uses uribel intermittently.  Use uribel post coital and any time you are having urgency.  --Recommend Prelief to help alkinize the urine:              --Prelief Tablets : Each tablet contains 345 mg calcium glycerophosphate (65 mg of elemental calcium). The tablets also contain 0.25% magnesium            stearate as a processing aid. Two tablets are equivalent to 690 mg calcium glycerophosphate (130mg of elemental calcium).              --Prelief Powder : Each ¼ teaspoon usage of powder is comparable to two tablets. The powder dissolves rapidly in food or non-alcoholic beverages.             Tablets are recommended for taking with alcoholic beverages              --Usage:                           --Tablets: 2-3 tablets, 2 times a day.                          --Powder: ¼ teaspoon of powder 2 times a day. More can be used when needed  --Hydrodistention With Cystoscopy: allows " physicians to take a much closer look at the bladder wall. While the AUA no longer suggests that it be used as a diagnostic method (unless a diagnosis is in doubt), hydrodistention may provide modest relief in IC symptoms which can last from three to six months.   --continue  align  --continue myrbetriq 50 mg daily        2)Vaginal atrophy (dryness):   --continue intrarosa daily          3)Constipation:  --Take 1 fiber pill/day x 3 days.  Then 2 pills/day x 3 days.  Then 3 pills/day x 3 days...increasing in this fashion to max 6 pills a day.  STOP when you find dose that makes stool easy to pass (this may be 1 pill a day or may be 4 pills/day).  Continue at this dose FOREVER.  Additionally, take 1-2 stool softener pills (EX: colace) OTC daily.  AVOID laxatives.    --continue  calm (can titrate)  --consider magnesium oxide 400 mg     4)pelvic floor tension  --continue pelvic floor PT home exercise program       6)RTC 4 months for followup    I spent a total of 20 minutes on the day of the visit.  This includes face to face time and non-face to face time preparing to see the patient (eg, review of tests), obtaining and/or reviewing separately obtained history, documenting clinical information in the electronic or other health record, independently interpreting results and communicating results to the patient/family/caregiver, or care coordinator.     Leona Broussard, [facility]  Division of Female Pelvic Medicine and Reconstructive Surgery  Department of Obstetrics & Gynecology

## 2024-01-25 ENCOUNTER — OFFICE VISIT (OUTPATIENT)
Dept: UROGYNECOLOGY | Facility: CLINIC | Age: 59
End: 2024-01-25
Payer: COMMERCIAL

## 2024-01-25 VITALS
DIASTOLIC BLOOD PRESSURE: 70 MMHG | HEIGHT: 65 IN | HEART RATE: 66 BPM | BODY MASS INDEX: 18.33 KG/M2 | SYSTOLIC BLOOD PRESSURE: 111 MMHG | WEIGHT: 110 LBS

## 2024-01-25 DIAGNOSIS — K59.00 CONSTIPATION, UNSPECIFIED CONSTIPATION TYPE: ICD-10-CM

## 2024-01-25 DIAGNOSIS — N95.2 VAGINAL ATROPHY: ICD-10-CM

## 2024-01-25 DIAGNOSIS — R39.15 URINARY URGENCY: Primary | ICD-10-CM

## 2024-01-25 DIAGNOSIS — N30.10 IC (INTERSTITIAL CYSTITIS): ICD-10-CM

## 2024-01-25 PROCEDURE — 3008F BODY MASS INDEX DOCD: CPT | Mod: CPTII,S$GLB,, | Performed by: NURSE PRACTITIONER

## 2024-01-25 PROCEDURE — 99999 PR PBB SHADOW E&M-EST. PATIENT-LVL IV: CPT | Mod: PBBFAC,,, | Performed by: NURSE PRACTITIONER

## 2024-01-25 PROCEDURE — 3074F SYST BP LT 130 MM HG: CPT | Mod: CPTII,S$GLB,, | Performed by: NURSE PRACTITIONER

## 2024-01-25 PROCEDURE — 1159F MED LIST DOCD IN RCRD: CPT | Mod: CPTII,S$GLB,, | Performed by: NURSE PRACTITIONER

## 2024-01-25 PROCEDURE — 99213 OFFICE O/P EST LOW 20 MIN: CPT | Mod: S$GLB,,, | Performed by: NURSE PRACTITIONER

## 2024-01-25 PROCEDURE — 1160F RVW MEDS BY RX/DR IN RCRD: CPT | Mod: CPTII,S$GLB,, | Performed by: NURSE PRACTITIONER

## 2024-01-25 PROCEDURE — 3078F DIAST BP <80 MM HG: CPT | Mod: CPTII,S$GLB,, | Performed by: NURSE PRACTITIONER

## 2024-01-25 NOTE — PATIENT INSTRUCTIONS
1)  IC  --Empty bladder every 3 hours.  Empty well: wait a minute, lean forward on toilet.    --Avoid dietary irritants (see sheet).  Keep diary x 3-5 days to determine your irritants.    --URGE: oxybutynin, trospium caused constipation. Myrbetriq > $500.  Uses uribel intermittently.  Use uribel post coital and any time you are having urgency.  --Recommend Prelief to help alkinize the urine:              --Prelief Tablets : Each tablet contains 345 mg calcium glycerophosphate (65 mg of elemental calcium). The tablets also contain 0.25% magnesium            stearate as a processing aid. Two tablets are equivalent to 690 mg calcium glycerophosphate (130mg of elemental calcium).              --Prelief Powder : Each ¼ teaspoon usage of powder is comparable to two tablets. The powder dissolves rapidly in food or non-alcoholic beverages.             Tablets are recommended for taking with alcoholic beverages              --Usage:                           --Tablets: 2-3 tablets, 2 times a day.                          --Powder: ¼ teaspoon of powder 2 times a day. More can be used when needed  --Hydrodistention With Cystoscopy: allows physicians to take a much closer look at the bladder wall. While the AUA no longer suggests that it be used as a diagnostic method (unless a diagnosis is in doubt), hydrodistention may provide modest relief in IC symptoms which can last from three to six months.   --continue  align  --continue myrbetriq 50 mg daily        2)Vaginal atrophy (dryness):   --continue intrarosa daily          3)Constipation:  --Take 1 fiber pill/day x 3 days.  Then 2 pills/day x 3 days.  Then 3 pills/day x 3 days...increasing in this fashion to max 6 pills a day.  STOP when you find dose that makes stool easy to pass (this may be 1 pill a day or may be 4 pills/day).  Continue at this dose FOREVER.  Additionally, take 1-2 stool softener pills (EX: colace) OTC daily.  AVOID laxatives.    --continue  calm (can  titrate)  --consider magnesium oxide 400 mg     4)pelvic floor tension  --continue pelvic floor PT home exercise program       6)RTC 4 months for followup

## 2024-01-30 ENCOUNTER — PATIENT MESSAGE (OUTPATIENT)
Dept: UROGYNECOLOGY | Facility: CLINIC | Age: 59
End: 2024-01-30

## 2024-01-30 ENCOUNTER — OFFICE VISIT (OUTPATIENT)
Dept: OBSTETRICS AND GYNECOLOGY | Facility: CLINIC | Age: 59
End: 2024-01-30
Payer: COMMERCIAL

## 2024-01-30 DIAGNOSIS — N95.1 MENOPAUSAL SYMPTOMS: Primary | ICD-10-CM

## 2024-01-30 PROCEDURE — 1160F RVW MEDS BY RX/DR IN RCRD: CPT | Mod: CPTII,95,, | Performed by: NURSE PRACTITIONER

## 2024-01-30 PROCEDURE — 1159F MED LIST DOCD IN RCRD: CPT | Mod: CPTII,95,, | Performed by: NURSE PRACTITIONER

## 2024-01-30 PROCEDURE — 99213 OFFICE O/P EST LOW 20 MIN: CPT | Mod: 95,,, | Performed by: NURSE PRACTITIONER

## 2024-01-30 NOTE — PROGRESS NOTES
Subjective:      Tara Unger is a 58 y.o. female who is here for follow-up of hormone replacement therapy via virtual visit. Current use of Vivelle Dot 0.025 mg/24 TD, Intrarosa nightly.     The patient states the following symptoms have improved:  hot flashes, night sweats, vaginal dryness.  Her main concern today is None.  She had the following side effects: None.  Patient denies post-menopausal vaginal bleeding. The patient is sexually active.     Current flare of IC, responding well to instillations.    Clinical Support on 01/19/2024   Component Date Value Ref Range Status    Color, UA 01/19/2024 Blue   Final    pH, UA 01/19/2024 5   Final    WBC, UA 01/19/2024 Negative   Final    Nitrite, UA 01/19/2024 Negative   Final    Protein, POC 01/19/2024 Negative   Final    Glucose, UA 01/19/2024 Normal   Final    Ketones, UA 01/19/2024 Negative   Final    Urobilinogen, UA 01/19/2024 Normal   Final    Bilirubin, POC 01/19/2024 Negative   Final    Blood, UA 01/19/2024 Negative   Final    Clarity, UA 01/19/2024 Clear   Final    Spec Grav UA 01/19/2024 1.015   Final   Clinical Support on 01/12/2024   Component Date Value Ref Range Status    Color, UA 01/12/2024 Light Yellow   Final    pH, UA 01/12/2024 7   Final    WBC, UA 01/12/2024 Negative   Final    Nitrite, UA 01/12/2024 Negative   Final    Protein, POC 01/12/2024 Trace   Final    Glucose, UA 01/12/2024 Negative   Final    Ketones, UA 01/12/2024 Negative   Final    Urobilinogen, UA 01/12/2024 Normal   Final    Bilirubin, POC 01/12/2024 Negative   Final    Blood, UA 01/12/2024 Negative   Final    Clarity, UA 01/12/2024 Clear   Final    Spec Grav UA 01/12/2024 1.010   Final   Clinical Support on 01/05/2024   Component Date Value Ref Range Status    Color, UA 01/05/2024 Green/Blue   Final    pH, UA 01/05/2024 5   Final    WBC, UA 01/05/2024 Negative   Final    Nitrite, UA 01/05/2024 Negative   Final    Protein, POC 01/05/2024 Negative   Final    Glucose, UA  01/05/2024 Normal   Final    Ketones, UA 01/05/2024 Negative   Final    Urobilinogen, UA 01/05/2024 Normal   Final    Bilirubin, POC 01/05/2024 Negative   Final    Blood, UA 01/05/2024 Negative   Final    Clarity, UA 01/05/2024 Clear   Final   Office Visit on 12/22/2023   Component Date Value Ref Range Status    Color, UA 12/22/2023 Green/Blue   Final    pH, UA 12/22/2023 5   Final    WBC, UA 12/22/2023 n   Final    Nitrite, UA 12/22/2023 n   Final    Protein, POC 12/22/2023 tr   Final    Glucose, UA 12/22/2023 n   Final    Ketones, UA 12/22/2023 n   Final    Urobilinogen, UA 12/22/2023 n   Final    Bilirubin, POC 12/22/2023 n   Final    Blood, UA 12/22/2023 tr   Final    Clarity, UA 12/22/2023 Clear   Final    Spec Grav UA 12/22/2023 1.015   Final    RBC, UA 12/22/2023 2  0 - 4 /hpf Final    WBC, UA 12/22/2023 13 (H)  0 - 5 /hpf Final    Bacteria 12/22/2023 Rare  None-Occ /hpf Final    Squam Epithel, UA 12/22/2023 0  /hpf Final    Microscopic Comment 12/22/2023 SEE COMMENT   Final    Urine Culture, Routine 12/22/2023 No growth   Final   Lab Visit on 11/17/2023   Component Date Value Ref Range Status    Specimen UA 11/17/2023 Urine, Clean Catch   Final    Color, UA 11/17/2023 Yellow  Yellow, Straw, Ingris Final    Appearance, UA 11/17/2023 Cloudy (A)  Clear Final    pH, UA 11/17/2023 7.0  5.0 - 8.0 Final    Specific Gravity, UA 11/17/2023 1.020  1.005 - 1.030 Final    Protein, UA 11/17/2023 Negative  Negative Final    Glucose, UA 11/17/2023 Negative  Negative Final    Ketones, UA 11/17/2023 Negative  Negative Final    Bilirubin (UA) 11/17/2023 Negative  Negative Final    Occult Blood UA 11/17/2023 Negative  Negative Final    Nitrite, UA 11/17/2023 Negative  Negative Final    Leukocytes, UA 11/17/2023 Negative  Negative Final    Urine Culture, Routine 11/17/2023 No growth   Final    RBC, UA 11/17/2023 3  0 - 4 /hpf Final    WBC, UA 11/17/2023 7 (H)  0 - 5 /hpf Final    Bacteria 11/17/2023 Rare  None-Occ /hpf Final     Squam Epithel, UA 2023 1  /hpf Final    Amorphous, UA 2023 Few  None-Moderate Final    Microscopic Comment 2023 SEE COMMENT   Final       Past Medical History:   Diagnosis Date    Urinary tract infection     Uterine fibroid      Past Surgical History:   Procedure Laterality Date    HYSTERECTOMY      2014    PELVIC LAPAROSCOPY      uterine septum repair    RHINOPLASTY TIP  1980    TUBAL LIGATION  1999    Postpartum    umbilical cyst       Social History     Tobacco Use    Smoking status: Never    Smokeless tobacco: Never   Substance Use Topics    Alcohol use: No     Comment: rare    Drug use: No     Family History   Problem Relation Age of Onset    Breast cancer Maternal Cousin     Ovarian cancer Maternal Cousin     Colon cancer Neg Hx     Diabetes Neg Hx     Hypertension Neg Hx     Stroke Neg Hx     Cervical cancer Neg Hx     Endometrial cancer Neg Hx     Vaginal cancer Neg Hx      OB History    Para Term  AB Living   6 4 3 1 2 3   SAB IAB Ectopic Multiple Live Births   2       3      # Outcome Date GA Lbr Tj/2nd Weight Sex Delivery Anes PTL Lv   6 Term            5 SAB            4 SAB            3 Term  38w0d    Vag-Spont   YORDY   2 Term  37w0d    Vag-Spont   YORDY   1   35w0d    Vag-Spont   YORDY       Current Outpatient Medications:     estradioL (VIVELLE-DOT) 0.025 mg/24 hr, Place 1 patch onto the skin twice a week., Disp: 8 patch, Rfl: 11    fluticasone propionate (FLONASE) 50 mcg/actuation nasal spray, 2 sprays (100 mcg total) by Each Nostril route once daily., Disp: 15 g, Rfl: 0    gabapentin (NEURONTIN) 100 MG capsule, Take 1 capsule (100 mg total) by mouth 3 (three) times daily., Disp: 90 capsule, Rfl: 11    LIDOCAINE 2 %, VALIUM 5 MG, BACLOFEN 4 % SUPPOSITORY, Place 1 suppository vaginally 2 (two) times daily as needed (bladder pain)., Disp: 10 each, Rfl: 5    methen-m.blue-s.phos-phsal-hyo (URIBEL) 118-10-40.8-36 mg Cap, Take 1 capsule by mouth 4 (four)  times daily as needed (bladder pain)., Disp: 120 capsule, Rfl: 11    mirabegron (MYRBETRIQ) 50 mg Tb24, Take 1 tablet (50 mg total) by mouth once daily., Disp: 30 tablet, Rfl: 11    omega-3 fatty acids 1,000 mg Cap, Take 2 g by mouth., Disp: , Rfl:     prasterone, dhea, (INTRAROSA) 6.5 mg Inst, INSERT 1 TABLET VAGINALLY ONCE DAILY, Disp: 28 each, Rfl: 11    UNABLE TO FIND, medication name: Calm, Disp: , Rfl:     UNABLE TO FIND, BENZOCAINE/CHLORBUTANOL/GUAIACOL OINTMENT, Disp: , Rfl:     URIBEL 118-10-40.8-36 mg Cap, TAKE 1 CAPSULE BY MOUTH FOUR TIMES DAILY AS NEEDED, Disp: 120 capsule, Rfl: 3    There were no vitals filed for this visit.  There is no height or weight on file to calculate BMI.       Assessment:    Menopausal symptoms        Plan:   Risks and benefits of hormone replacement therapy were discussed.  Hormone replacement therapy options, including bioidentical versus non-bioidentical hormones, as well as alternatives discussed.    Will continue current HRT regimen    Follow up in 3 months for WWE.  Instructed patient to call if she experiences any side effects or has any questions.       LILIANE Norris

## 2024-02-01 ENCOUNTER — PROCEDURE VISIT (OUTPATIENT)
Dept: UROGYNECOLOGY | Facility: CLINIC | Age: 59
End: 2024-02-01
Payer: COMMERCIAL

## 2024-02-01 VITALS
SYSTOLIC BLOOD PRESSURE: 115 MMHG | DIASTOLIC BLOOD PRESSURE: 71 MMHG | BODY MASS INDEX: 18.44 KG/M2 | HEART RATE: 68 BPM | WEIGHT: 110.69 LBS | HEIGHT: 65 IN

## 2024-02-01 DIAGNOSIS — N30.10 IC (INTERSTITIAL CYSTITIS): Primary | ICD-10-CM

## 2024-02-01 DIAGNOSIS — R39.89 BLADDER PAIN: ICD-10-CM

## 2024-02-01 PROCEDURE — 51700 IRRIGATION OF BLADDER: CPT | Mod: S$GLB,,, | Performed by: NURSE PRACTITIONER

## 2024-02-01 PROCEDURE — 87086 URINE CULTURE/COLONY COUNT: CPT | Performed by: NURSE PRACTITIONER

## 2024-02-01 RX ORDER — HYDROXYZINE HYDROCHLORIDE 10 MG/1
10 TABLET, FILM COATED ORAL NIGHTLY
Qty: 30 TABLET | Refills: 11 | Status: SHIPPED | OUTPATIENT
Start: 2024-02-01 | End: 2025-02-01

## 2024-02-01 RX ORDER — SODIUM BICARBONATE 42 MG/ML
2.5 INJECTION, SOLUTION INTRAVENOUS ONCE
Status: DISCONTINUED | OUTPATIENT
Start: 2024-02-01 | End: 2024-02-01

## 2024-02-01 RX ORDER — HEPARIN SODIUM 1000 [USP'U]/ML
10000 INJECTION, SOLUTION INTRAVENOUS; SUBCUTANEOUS ONCE
Status: COMPLETED | OUTPATIENT
Start: 2024-02-01 | End: 2024-02-01

## 2024-02-01 RX ORDER — TRIAMCINOLONE ACETONIDE 40 MG/ML
40 INJECTION, SUSPENSION INTRA-ARTICULAR; INTRAMUSCULAR ONCE
Status: COMPLETED | OUTPATIENT
Start: 2024-02-01 | End: 2024-02-01

## 2024-02-01 RX ORDER — SODIUM BICARBONATE 42 MG/ML
2.5 INJECTION, SOLUTION INTRAVENOUS ONCE
Status: COMPLETED | OUTPATIENT
Start: 2024-02-01 | End: 2024-02-01

## 2024-02-01 RX ORDER — BUPIVACAINE HYDROCHLORIDE 5 MG/ML
20 INJECTION, SOLUTION EPIDURAL; INTRACAUDAL
Status: COMPLETED | OUTPATIENT
Start: 2024-02-01 | End: 2024-02-01

## 2024-02-01 RX ADMIN — TRIAMCINOLONE ACETONIDE 40 MG: 40 INJECTION, SUSPENSION INTRA-ARTICULAR; INTRAMUSCULAR at 09:02

## 2024-02-01 RX ADMIN — HEPARIN SODIUM 10000 UNITS: 1000 INJECTION, SOLUTION INTRAVENOUS; SUBCUTANEOUS at 09:02

## 2024-02-01 RX ADMIN — SODIUM BICARBONATE 5 ML: 42 INJECTION, SOLUTION INTRAVENOUS at 09:02

## 2024-02-01 RX ADMIN — BUPIVACAINE HYDROCHLORIDE 100 MG: 5 INJECTION, SOLUTION EPIDURAL; INTRACAUDAL at 09:02

## 2024-02-01 NOTE — PROCEDURES
Procedures    A #14 red rubber cath inserted into bladder using sterile technique. The urethral meatus was prepped with betadine prior to cath insertion.  20 cc clear blue urine drained from bladder.  A solution of  5% marcaine 20 mL, heparin 10,000 IU/ml 10 mL, kenalog 40 mg, and sodium bicarb 4.2% (2.5 mEq) was instilled into bladder without difficulty. Procedure was tolerated very well. She was instructed to hold urine for one hour before voiding. GILDARDO Mccarthy-BC

## 2024-02-02 LAB — BACTERIA UR CULT: NO GROWTH

## 2024-02-08 ENCOUNTER — TELEPHONE (OUTPATIENT)
Dept: OBSTETRICS AND GYNECOLOGY | Facility: CLINIC | Age: 59
End: 2024-02-08

## 2024-02-08 NOTE — TELEPHONE ENCOUNTER
----- Message from Carri Corbin NP sent at 1/30/2024 12:15 PM CST -----  Needs annual with me in next few months

## 2024-02-09 ENCOUNTER — PROCEDURE VISIT (OUTPATIENT)
Dept: UROGYNECOLOGY | Facility: CLINIC | Age: 59
End: 2024-02-09
Payer: COMMERCIAL

## 2024-02-09 ENCOUNTER — ANESTHESIA (OUTPATIENT)
Dept: UROGYNECOLOGY | Facility: CLINIC | Age: 59
End: 2024-02-09

## 2024-02-09 ENCOUNTER — ANESTHESIA EVENT (OUTPATIENT)
Dept: UROGYNECOLOGY | Facility: CLINIC | Age: 59
End: 2024-02-09

## 2024-02-09 VITALS
HEART RATE: 72 BPM | BODY MASS INDEX: 18.26 KG/M2 | WEIGHT: 109.56 LBS | SYSTOLIC BLOOD PRESSURE: 111 MMHG | DIASTOLIC BLOOD PRESSURE: 65 MMHG | HEIGHT: 65 IN

## 2024-02-09 DIAGNOSIS — N30.10 IC (INTERSTITIAL CYSTITIS): Primary | ICD-10-CM

## 2024-02-09 PROCEDURE — 51700 IRRIGATION OF BLADDER: CPT | Mod: S$GLB,,, | Performed by: NURSE PRACTITIONER

## 2024-02-09 RX ORDER — BUPIVACAINE HYDROCHLORIDE 5 MG/ML
20 INJECTION, SOLUTION EPIDURAL; INTRACAUDAL
Status: COMPLETED | OUTPATIENT
Start: 2024-02-09 | End: 2024-02-09

## 2024-02-09 RX ORDER — TRIAMCINOLONE ACETONIDE 40 MG/ML
40 INJECTION, SUSPENSION INTRA-ARTICULAR; INTRAMUSCULAR ONCE
Status: COMPLETED | OUTPATIENT
Start: 2024-02-09 | End: 2024-02-09

## 2024-02-09 RX ORDER — SODIUM BICARBONATE 42 MG/ML
2.5 INJECTION, SOLUTION INTRAVENOUS ONCE
Status: COMPLETED | OUTPATIENT
Start: 2024-02-09 | End: 2024-02-09

## 2024-02-09 RX ORDER — HEPARIN SODIUM 1000 [USP'U]/ML
10000 INJECTION, SOLUTION INTRAVENOUS; SUBCUTANEOUS ONCE
Status: COMPLETED | OUTPATIENT
Start: 2024-02-09 | End: 2024-02-09

## 2024-02-09 RX ADMIN — SODIUM BICARBONATE 5 ML: 42 INJECTION, SOLUTION INTRAVENOUS at 09:02

## 2024-02-09 RX ADMIN — HEPARIN SODIUM 10000 UNITS: 1000 INJECTION, SOLUTION INTRAVENOUS; SUBCUTANEOUS at 09:02

## 2024-02-09 RX ADMIN — TRIAMCINOLONE ACETONIDE 40 MG: 40 INJECTION, SUSPENSION INTRA-ARTICULAR; INTRAMUSCULAR at 09:02

## 2024-02-09 RX ADMIN — BUPIVACAINE HYDROCHLORIDE 100 MG: 5 INJECTION, SOLUTION EPIDURAL; INTRACAUDAL at 09:02

## 2024-02-09 NOTE — PROCEDURES
Procedures    A #14 red rubber cath inserted into bladder using sterile technique. The urethral meatus was prepped with betadine prior to cath insertion.  10 cc clear yellow urine drained from bladder.  A solution of  5% marcaine 20 mL, heparin 10,000 IU/ml 10 mL, kenalog 40 mg, and sodium bicarb 4.2% (2.5 mEq) was instilled into bladder without difficulty. Procedure was tolerated very well. She was instructed to hold urine for one hour before voiding. GILDARDO Mccarthy-BC

## 2024-02-20 ENCOUNTER — PROCEDURE VISIT (OUTPATIENT)
Dept: UROGYNECOLOGY | Facility: CLINIC | Age: 59
End: 2024-02-20
Payer: COMMERCIAL

## 2024-02-20 VITALS
HEART RATE: 73 BPM | SYSTOLIC BLOOD PRESSURE: 100 MMHG | HEIGHT: 65 IN | BODY MASS INDEX: 18.37 KG/M2 | DIASTOLIC BLOOD PRESSURE: 67 MMHG | WEIGHT: 110.25 LBS

## 2024-02-20 DIAGNOSIS — N30.10 IC (INTERSTITIAL CYSTITIS): Primary | ICD-10-CM

## 2024-02-20 PROCEDURE — 51700 IRRIGATION OF BLADDER: CPT | Mod: S$GLB,,, | Performed by: NURSE PRACTITIONER

## 2024-02-20 RX ORDER — SODIUM BICARBONATE 42 MG/ML
2.5 INJECTION, SOLUTION INTRAVENOUS ONCE
Status: COMPLETED | OUTPATIENT
Start: 2024-02-20 | End: 2024-02-20

## 2024-02-20 RX ORDER — TRIAMCINOLONE ACETONIDE 40 MG/ML
40 INJECTION, SUSPENSION INTRA-ARTICULAR; INTRAMUSCULAR ONCE
Status: COMPLETED | OUTPATIENT
Start: 2024-02-20 | End: 2024-02-20

## 2024-02-20 RX ORDER — HEPARIN SODIUM 1000 [USP'U]/ML
10000 INJECTION, SOLUTION INTRAVENOUS; SUBCUTANEOUS ONCE
Status: COMPLETED | OUTPATIENT
Start: 2024-02-20 | End: 2024-02-20

## 2024-02-20 RX ORDER — BUPIVACAINE HYDROCHLORIDE 5 MG/ML
20 INJECTION, SOLUTION EPIDURAL; INTRACAUDAL
Status: COMPLETED | OUTPATIENT
Start: 2024-02-20 | End: 2024-02-20

## 2024-02-20 RX ADMIN — TRIAMCINOLONE ACETONIDE 40 MG: 40 INJECTION, SUSPENSION INTRA-ARTICULAR; INTRAMUSCULAR at 10:02

## 2024-02-20 RX ADMIN — BUPIVACAINE HYDROCHLORIDE 100 MG: 5 INJECTION, SOLUTION EPIDURAL; INTRACAUDAL at 10:02

## 2024-02-20 RX ADMIN — SODIUM BICARBONATE 5 ML: 42 INJECTION, SOLUTION INTRAVENOUS at 10:02

## 2024-02-20 RX ADMIN — HEPARIN SODIUM 10000 UNITS: 1000 INJECTION, SOLUTION INTRAVENOUS; SUBCUTANEOUS at 10:02

## 2024-02-20 NOTE — PROGRESS NOTES
A #14 red rubber cath inserted into bladder using sterile technique. The urethral meatus was prepped with betadine prior to cath insertion.  20 cc clear green urine drained from bladder.  A solution of  5% marcaine 20 mL, heparin 10,000 IU/ml 10 mL, kenalog 40 mg, and sodium bicarb 4.2% (2.5 mEq) was instilled into bladder without difficulty. Procedure was tolerated very well. She was instructed to hold urine for one hour before voiding. GILDARDO Mccarthy-BC

## 2024-03-01 ENCOUNTER — PROCEDURE VISIT (OUTPATIENT)
Dept: UROGYNECOLOGY | Facility: CLINIC | Age: 59
End: 2024-03-01
Payer: COMMERCIAL

## 2024-03-01 VITALS
SYSTOLIC BLOOD PRESSURE: 113 MMHG | HEART RATE: 76 BPM | WEIGHT: 110.44 LBS | HEIGHT: 65 IN | DIASTOLIC BLOOD PRESSURE: 63 MMHG | BODY MASS INDEX: 18.4 KG/M2

## 2024-03-01 DIAGNOSIS — N30.10 IC (INTERSTITIAL CYSTITIS): Primary | ICD-10-CM

## 2024-03-01 PROCEDURE — 51700 IRRIGATION OF BLADDER: CPT | Mod: S$GLB,,, | Performed by: NURSE PRACTITIONER

## 2024-03-01 RX ORDER — HEPARIN SODIUM 1000 [USP'U]/ML
10000 INJECTION, SOLUTION INTRAVENOUS; SUBCUTANEOUS ONCE
Status: COMPLETED | OUTPATIENT
Start: 2024-03-01 | End: 2024-03-01

## 2024-03-01 RX ORDER — SODIUM BICARBONATE 42 MG/ML
2.5 INJECTION, SOLUTION INTRAVENOUS ONCE
Status: COMPLETED | OUTPATIENT
Start: 2024-03-01 | End: 2024-03-01

## 2024-03-01 RX ORDER — TRIAMCINOLONE ACETONIDE 40 MG/ML
40 INJECTION, SUSPENSION INTRA-ARTICULAR; INTRAMUSCULAR ONCE
Status: COMPLETED | OUTPATIENT
Start: 2024-03-01 | End: 2024-03-01

## 2024-03-01 RX ORDER — BUPIVACAINE HYDROCHLORIDE 5 MG/ML
20 INJECTION, SOLUTION EPIDURAL; INTRACAUDAL
Status: COMPLETED | OUTPATIENT
Start: 2024-03-01 | End: 2024-03-01

## 2024-03-01 RX ADMIN — TRIAMCINOLONE ACETONIDE 40 MG: 40 INJECTION, SUSPENSION INTRA-ARTICULAR; INTRAMUSCULAR at 09:03

## 2024-03-01 RX ADMIN — SODIUM BICARBONATE 5 ML: 42 INJECTION, SOLUTION INTRAVENOUS at 09:03

## 2024-03-01 RX ADMIN — HEPARIN SODIUM 10000 UNITS: 1000 INJECTION, SOLUTION INTRAVENOUS; SUBCUTANEOUS at 09:03

## 2024-03-01 RX ADMIN — BUPIVACAINE HYDROCHLORIDE 100 MG: 5 INJECTION, SOLUTION EPIDURAL; INTRACAUDAL at 09:03

## 2024-03-26 ENCOUNTER — OFFICE VISIT (OUTPATIENT)
Dept: UROGYNECOLOGY | Facility: CLINIC | Age: 59
End: 2024-03-26
Payer: COMMERCIAL

## 2024-03-26 VITALS
HEART RATE: 79 BPM | BODY MASS INDEX: 18.33 KG/M2 | DIASTOLIC BLOOD PRESSURE: 63 MMHG | SYSTOLIC BLOOD PRESSURE: 99 MMHG | WEIGHT: 110 LBS | HEIGHT: 65 IN

## 2024-03-26 DIAGNOSIS — N30.10 IC (INTERSTITIAL CYSTITIS): Primary | ICD-10-CM

## 2024-03-26 PROCEDURE — 51700 IRRIGATION OF BLADDER: CPT | Mod: S$GLB,,, | Performed by: NURSE PRACTITIONER

## 2024-03-26 PROCEDURE — 99999 PR PBB SHADOW E&M-EST. PATIENT-LVL IV: CPT | Mod: PBBFAC,,, | Performed by: NURSE PRACTITIONER

## 2024-03-26 PROCEDURE — 99499 UNLISTED E&M SERVICE: CPT | Mod: 25,S$GLB,, | Performed by: NURSE PRACTITIONER

## 2024-03-26 RX ORDER — TRIAMCINOLONE ACETONIDE 40 MG/ML
40 INJECTION, SUSPENSION INTRA-ARTICULAR; INTRAMUSCULAR ONCE
Status: COMPLETED | OUTPATIENT
Start: 2024-03-26 | End: 2024-03-26

## 2024-03-26 RX ORDER — HEPARIN SODIUM 5000 [USP'U]/ML
10000 INJECTION, SOLUTION INTRAVENOUS; SUBCUTANEOUS
Status: COMPLETED | OUTPATIENT
Start: 2024-03-26 | End: 2024-03-26

## 2024-03-26 RX ORDER — SODIUM BICARBONATE 42 MG/ML
2.5 INJECTION, SOLUTION INTRAVENOUS ONCE
Status: COMPLETED | OUTPATIENT
Start: 2024-03-26 | End: 2024-03-26

## 2024-03-26 RX ORDER — BUPIVACAINE HYDROCHLORIDE 5 MG/ML
20 INJECTION, SOLUTION EPIDURAL; INTRACAUDAL
Status: COMPLETED | OUTPATIENT
Start: 2024-03-26 | End: 2024-03-26

## 2024-03-26 RX ADMIN — TRIAMCINOLONE ACETONIDE 40 MG: 40 INJECTION, SUSPENSION INTRA-ARTICULAR; INTRAMUSCULAR at 10:03

## 2024-03-26 RX ADMIN — BUPIVACAINE HYDROCHLORIDE 100 MG: 5 INJECTION, SOLUTION EPIDURAL; INTRACAUDAL at 10:03

## 2024-03-26 RX ADMIN — SODIUM BICARBONATE 5 ML: 42 INJECTION, SOLUTION INTRAVENOUS at 10:03

## 2024-03-26 RX ADMIN — HEPARIN SODIUM 10000 UNITS: 5000 INJECTION, SOLUTION INTRAVENOUS; SUBCUTANEOUS at 10:03

## 2024-03-26 NOTE — PROGRESS NOTES
A #14 red rubber cath inserted into bladder using sterile technique. The urethral meatus was prepped with betadine prior to cath insertion.  40 cc clear yellow urine drained from bladder.  A solution of  5% marcaine 20 mL, heparin 10,000 IU/ml 2 mL, kenalog 40 mg, and sodium bicarb 4.2% (2.5 mEq) was instilled into bladder without difficulty. Procedure was tolerated very well. She was instructed to hold urine for one hour before voiding. GILDARDO Mccarthy-BC

## 2024-04-25 ENCOUNTER — PATIENT MESSAGE (OUTPATIENT)
Dept: UROGYNECOLOGY | Facility: CLINIC | Age: 59
End: 2024-04-25
Payer: COMMERCIAL

## 2024-04-25 RX ORDER — METHENAMINE, SODIUM PHOSPHATE, MONOBASIC, MONOHYDRATE, PHENYL SALICYLATE, METHYLENE BLUE, AND HYOSCYAMINE SULFATE 118; 40.8; 36; 10; .12 MG/1; MG/1; MG/1; MG/1; MG/1
1 CAPSULE ORAL 4 TIMES DAILY PRN
Qty: 120 CAPSULE | Refills: 3 | Status: SHIPPED | OUTPATIENT
Start: 2024-04-25

## 2024-04-30 ENCOUNTER — OFFICE VISIT (OUTPATIENT)
Dept: UROGYNECOLOGY | Facility: CLINIC | Age: 59
End: 2024-04-30
Payer: COMMERCIAL

## 2024-04-30 VITALS
WEIGHT: 108.69 LBS | BODY MASS INDEX: 18.11 KG/M2 | HEIGHT: 65 IN | DIASTOLIC BLOOD PRESSURE: 60 MMHG | SYSTOLIC BLOOD PRESSURE: 100 MMHG

## 2024-04-30 DIAGNOSIS — N30.10 IC (INTERSTITIAL CYSTITIS): Primary | ICD-10-CM

## 2024-04-30 LAB
BLOOD URINE, POC: NORMAL
CLARITY, POC UA: CLEAR
COLOR, POC UA: NORMAL
GLUCOSE UR QL STRIP: NORMAL
KETONES UR QL STRIP: NORMAL
LEUKOCYTE ESTERASE URINE, POC: NORMAL
NITRITE, POC UA: NORMAL
PH, POC UA: 5
PROTEIN, POC: NORMAL
SPECIFIC GRAVITY, POC UA: 1.02
UROBILINOGEN, POC UA: NORMAL

## 2024-04-30 PROCEDURE — 99499 UNLISTED E&M SERVICE: CPT | Mod: S$GLB,,, | Performed by: NURSE PRACTITIONER

## 2024-04-30 PROCEDURE — 51700 IRRIGATION OF BLADDER: CPT | Mod: S$GLB,,, | Performed by: NURSE PRACTITIONER

## 2024-04-30 PROCEDURE — 99999 PR PBB SHADOW E&M-EST. PATIENT-LVL IV: CPT | Mod: PBBFAC,,, | Performed by: NURSE PRACTITIONER

## 2024-04-30 PROCEDURE — 81002 URINALYSIS NONAUTO W/O SCOPE: CPT | Mod: S$GLB,,, | Performed by: NURSE PRACTITIONER

## 2024-04-30 PROCEDURE — 99214 OFFICE O/P EST MOD 30 MIN: CPT | Mod: PBBFAC,25 | Performed by: NURSE PRACTITIONER

## 2024-04-30 PROCEDURE — 81002 URINALYSIS NONAUTO W/O SCOPE: CPT | Mod: PBBFAC | Performed by: NURSE PRACTITIONER

## 2024-04-30 PROCEDURE — 51700 IRRIGATION OF BLADDER: CPT | Mod: PBBFAC | Performed by: NURSE PRACTITIONER

## 2024-04-30 RX ORDER — CIPROFLOXACIN 500 MG/1
500 TABLET ORAL
COMMUNITY

## 2024-04-30 RX ORDER — HEPARIN SODIUM 5000 [USP'U]/ML
10000 INJECTION, SOLUTION INTRAVENOUS; SUBCUTANEOUS
Status: COMPLETED | OUTPATIENT
Start: 2024-04-30 | End: 2024-04-30

## 2024-04-30 RX ORDER — BUPIVACAINE HYDROCHLORIDE 5 MG/ML
20 INJECTION, SOLUTION EPIDURAL; INTRACAUDAL
Status: DISCONTINUED | OUTPATIENT
Start: 2024-04-30 | End: 2024-04-30

## 2024-04-30 RX ORDER — BUPIVACAINE HYDROCHLORIDE 5 MG/ML
10 INJECTION, SOLUTION EPIDURAL; INTRACAUDAL
Status: COMPLETED | OUTPATIENT
Start: 2024-04-30 | End: 2024-04-30

## 2024-04-30 RX ORDER — BUPIVACAINE HYDROCHLORIDE 2.5 MG/ML
20 INJECTION, SOLUTION EPIDURAL; INFILTRATION; INTRACAUDAL
Status: COMPLETED | OUTPATIENT
Start: 2024-04-30 | End: 2024-04-30

## 2024-04-30 RX ORDER — TRIAMCINOLONE ACETONIDE 40 MG/ML
40 INJECTION, SUSPENSION INTRA-ARTICULAR; INTRAMUSCULAR ONCE
Status: COMPLETED | OUTPATIENT
Start: 2024-04-30 | End: 2024-04-30

## 2024-04-30 RX ORDER — SODIUM BICARBONATE 42 MG/ML
2.5 INJECTION, SOLUTION INTRAVENOUS ONCE
Status: COMPLETED | OUTPATIENT
Start: 2024-04-30 | End: 2024-04-30

## 2024-04-30 RX ADMIN — HEPARIN SODIUM 10000 UNITS: 5000 INJECTION, SOLUTION INTRAVENOUS; SUBCUTANEOUS at 10:04

## 2024-04-30 RX ADMIN — SODIUM BICARBONATE 5 ML: 42 INJECTION, SOLUTION INTRAVENOUS at 10:04

## 2024-04-30 RX ADMIN — BUPIVACAINE HYDROCHLORIDE 50 MG: 5 INJECTION, SOLUTION EPIDURAL; INTRACAUDAL at 10:04

## 2024-04-30 RX ADMIN — TRIAMCINOLONE ACETONIDE 40 MG: 40 INJECTION, SUSPENSION INTRA-ARTICULAR; INTRAMUSCULAR at 10:04

## 2024-04-30 RX ADMIN — BUPIVACAINE HYDROCHLORIDE 50 MG: 2.5 INJECTION, SOLUTION EPIDURAL; INFILTRATION; INTRACAUDAL at 10:04

## 2024-04-30 NOTE — PROGRESS NOTES
A #14 red rubber cath inserted into bladder using sterile technique. The urethral meatus was prepped with betadine prior to cath insertion.  0 cc urine drained from bladder.  A solution of  5% marcaine 10 mL, 2.5% 20 mL heparin 10,000 IU/ml 2 mL, kenalog 40 mg, and sodium bicarb 4.2% (2.5 mEq) was instilled into bladder without difficulty. Procedure was tolerated very well. She was instructed to hold urine for one hour before voiding. GILDARDO Mccarthy-BC

## 2024-05-21 ENCOUNTER — PATIENT MESSAGE (OUTPATIENT)
Dept: OBSTETRICS AND GYNECOLOGY | Facility: CLINIC | Age: 59
End: 2024-05-21
Payer: COMMERCIAL

## 2024-05-21 DIAGNOSIS — Z12.31 BREAST CANCER SCREENING BY MAMMOGRAM: Primary | ICD-10-CM

## 2024-05-31 ENCOUNTER — PATIENT MESSAGE (OUTPATIENT)
Dept: UROGYNECOLOGY | Facility: CLINIC | Age: 59
End: 2024-05-31
Payer: COMMERCIAL

## 2024-06-03 ENCOUNTER — HOSPITAL ENCOUNTER (OUTPATIENT)
Dept: RADIOLOGY | Facility: HOSPITAL | Age: 59
Discharge: HOME | End: 2024-06-03
Attending: NURSE PRACTITIONER
Payer: COMMERCIAL

## 2024-06-03 DIAGNOSIS — Z12.31 BREAST CANCER SCREENING BY MAMMOGRAM: ICD-10-CM

## 2024-06-03 PROCEDURE — 77067 SCR MAMMO BI INCL CAD: CPT | Mod: 26,,, | Performed by: RADIOLOGY

## 2024-06-03 PROCEDURE — 77063 BREAST TOMOSYNTHESIS BI: CPT | Mod: 26,,, | Performed by: RADIOLOGY

## 2024-06-03 PROCEDURE — 77067 SCR MAMMO BI INCL CAD: CPT | Mod: TC,PO

## 2024-06-19 ENCOUNTER — PATIENT MESSAGE (OUTPATIENT)
Dept: UROGYNECOLOGY | Facility: CLINIC | Age: 59
End: 2024-06-19
Payer: COMMERCIAL

## 2024-06-19 DIAGNOSIS — N30.10 IC (INTERSTITIAL CYSTITIS): ICD-10-CM

## 2024-06-19 RX ORDER — MIRABEGRON 50 MG/1
1 TABLET, EXTENDED RELEASE ORAL DAILY
Qty: 30 TABLET | Refills: 11 | Status: SHIPPED | OUTPATIENT
Start: 2024-06-19 | End: 2025-06-19

## 2024-07-01 ENCOUNTER — CLINICAL SUPPORT (OUTPATIENT)
Dept: UROGYNECOLOGY | Facility: CLINIC | Age: 59
End: 2024-07-01
Payer: COMMERCIAL

## 2024-07-01 VITALS
HEART RATE: 70 BPM | HEIGHT: 65 IN | DIASTOLIC BLOOD PRESSURE: 63 MMHG | SYSTOLIC BLOOD PRESSURE: 100 MMHG | BODY MASS INDEX: 18.55 KG/M2 | WEIGHT: 111.31 LBS

## 2024-07-01 DIAGNOSIS — N30.10 IC (INTERSTITIAL CYSTITIS): Primary | ICD-10-CM

## 2024-07-01 LAB
BILIRUB SERPL-MCNC: NORMAL MG/DL
BLOOD URINE, POC: NORMAL
CLARITY, POC UA: CLEAR
COLOR, POC UA: NORMAL
GLUCOSE UR QL STRIP: NORMAL
KETONES UR QL STRIP: NORMAL
LEUKOCYTE ESTERASE URINE, POC: NORMAL
NITRITE, POC UA: NORMAL
PH, POC UA: 5
PROTEIN, POC: NORMAL
SPECIFIC GRAVITY, POC UA: 1.01
UROBILINOGEN, POC UA: NORMAL

## 2024-07-01 PROCEDURE — 99999 PR PBB SHADOW E&M-EST. PATIENT-LVL IV: CPT | Mod: PBBFAC,,,

## 2024-07-01 RX ORDER — TRIAMCINOLONE ACETONIDE 40 MG/ML
40 INJECTION, SUSPENSION INTRA-ARTICULAR; INTRAMUSCULAR
Status: COMPLETED | OUTPATIENT
Start: 2024-07-01 | End: 2024-07-01

## 2024-07-01 RX ORDER — SODIUM BICARBONATE 42 MG/ML
2.5 INJECTION, SOLUTION INTRAVENOUS ONCE
Status: COMPLETED | OUTPATIENT
Start: 2024-07-01 | End: 2024-07-01

## 2024-07-01 RX ORDER — BUPIVACAINE HYDROCHLORIDE 5 MG/ML
20 INJECTION, SOLUTION EPIDURAL; INTRACAUDAL
Status: COMPLETED | OUTPATIENT
Start: 2024-07-01 | End: 2024-07-01

## 2024-07-01 RX ORDER — HEPARIN SODIUM 5000 [USP'U]/ML
10000 INJECTION, SOLUTION INTRAVENOUS; SUBCUTANEOUS
Status: COMPLETED | OUTPATIENT
Start: 2024-07-01 | End: 2024-07-01

## 2024-07-01 RX ADMIN — SODIUM BICARBONATE 5 ML: 42 INJECTION, SOLUTION INTRAVENOUS at 10:07

## 2024-07-01 RX ADMIN — TRIAMCINOLONE ACETONIDE 40 MG: 40 INJECTION, SUSPENSION INTRA-ARTICULAR; INTRAMUSCULAR at 10:07

## 2024-07-01 RX ADMIN — BUPIVACAINE HYDROCHLORIDE 100 MG: 5 INJECTION, SOLUTION EPIDURAL; INTRACAUDAL at 10:07

## 2024-07-01 RX ADMIN — HEPARIN SODIUM 10000 UNITS: 5000 INJECTION, SOLUTION INTRAVENOUS; SUBCUTANEOUS at 10:07

## 2024-07-01 NOTE — PROGRESS NOTES
A #14 red rubber cath inserted into bladder using sterile technique. The urethral meatus was prepped with betadine prior to cath insertion.  40 cc clear green/blue urine drained from bladder.  A solution of  5% marcaine 20 mL, heparin 10,000 IU/ml 2 mL, kenalog 40 mg, and sodium bicarb 4.2% (2.5 mEq) was instilled into bladder without difficulty. Procedure was tolerated very well. She was instructed to hold urine for one hour before voiding.

## 2024-08-05 ENCOUNTER — OFFICE VISIT (OUTPATIENT)
Dept: OBSTETRICS AND GYNECOLOGY | Facility: CLINIC | Age: 59
End: 2024-08-05
Payer: COMMERCIAL

## 2024-08-05 VITALS
DIASTOLIC BLOOD PRESSURE: 69 MMHG | BODY MASS INDEX: 18.14 KG/M2 | HEART RATE: 71 BPM | WEIGHT: 109 LBS | SYSTOLIC BLOOD PRESSURE: 106 MMHG

## 2024-08-05 DIAGNOSIS — Z01.419 WOMEN'S ANNUAL ROUTINE GYNECOLOGICAL EXAMINATION: Primary | ICD-10-CM

## 2024-08-05 DIAGNOSIS — N95.1 MENOPAUSAL SYMPTOMS: ICD-10-CM

## 2024-08-05 DIAGNOSIS — Z13.820 SCREENING FOR OSTEOPOROSIS: ICD-10-CM

## 2024-08-05 PROCEDURE — 3008F BODY MASS INDEX DOCD: CPT | Mod: CPTII,S$GLB,, | Performed by: NURSE PRACTITIONER

## 2024-08-05 PROCEDURE — 1160F RVW MEDS BY RX/DR IN RCRD: CPT | Mod: CPTII,S$GLB,, | Performed by: NURSE PRACTITIONER

## 2024-08-05 PROCEDURE — 99999 PR PBB SHADOW E&M-EST. PATIENT-LVL III: CPT | Mod: PBBFAC,,, | Performed by: NURSE PRACTITIONER

## 2024-08-05 PROCEDURE — 99396 PREV VISIT EST AGE 40-64: CPT | Mod: S$GLB,,, | Performed by: NURSE PRACTITIONER

## 2024-08-05 PROCEDURE — 3074F SYST BP LT 130 MM HG: CPT | Mod: CPTII,S$GLB,, | Performed by: NURSE PRACTITIONER

## 2024-08-05 PROCEDURE — 1159F MED LIST DOCD IN RCRD: CPT | Mod: CPTII,S$GLB,, | Performed by: NURSE PRACTITIONER

## 2024-08-05 PROCEDURE — 3078F DIAST BP <80 MM HG: CPT | Mod: CPTII,S$GLB,, | Performed by: NURSE PRACTITIONER

## 2024-08-05 RX ORDER — ESTRADIOL 0.25 MG/.25G
1 GEL TOPICAL DAILY
Qty: 30 PACKET | Refills: 11 | Status: SHIPPED | OUTPATIENT
Start: 2024-08-05 | End: 2025-08-05

## 2024-08-09 ENCOUNTER — HOSPITAL ENCOUNTER (OUTPATIENT)
Dept: RADIOLOGY | Facility: CLINIC | Age: 59
Discharge: HOME | End: 2024-08-09
Attending: NURSE PRACTITIONER
Payer: COMMERCIAL

## 2024-08-09 DIAGNOSIS — Z13.820 SCREENING FOR OSTEOPOROSIS: ICD-10-CM

## 2024-08-09 PROCEDURE — 77080 DXA BONE DENSITY AXIAL: CPT | Mod: TC,PO

## 2024-08-09 PROCEDURE — 77080 DXA BONE DENSITY AXIAL: CPT | Mod: 26,,, | Performed by: INTERNAL MEDICINE

## 2024-08-15 ENCOUNTER — TELEPHONE (OUTPATIENT)
Dept: OBSTETRICS AND GYNECOLOGY | Facility: CLINIC | Age: 59
End: 2024-08-15
Payer: COMMERCIAL

## 2024-08-15 ENCOUNTER — PATIENT MESSAGE (OUTPATIENT)
Dept: OBSTETRICS AND GYNECOLOGY | Facility: CLINIC | Age: 59
End: 2024-08-15
Payer: COMMERCIAL

## 2024-08-15 NOTE — TELEPHONE ENCOUNTER
----- Message from Carri Corbin NP sent at 8/15/2024  2:18 PM CDT -----  Needs virtual to discuss DEXA results- Wellness  ----- Message -----  From: Interface, Rad Results In  Sent: 8/15/2024  12:02 PM CDT  To: Carri Corbin NP

## 2024-09-03 ENCOUNTER — TELEPHONE (OUTPATIENT)
Dept: OBSTETRICS AND GYNECOLOGY | Facility: CLINIC | Age: 59
End: 2024-09-03
Payer: COMMERCIAL

## 2024-09-10 ENCOUNTER — OFFICE VISIT (OUTPATIENT)
Dept: OBSTETRICS AND GYNECOLOGY | Facility: CLINIC | Age: 59
End: 2024-09-10
Payer: COMMERCIAL

## 2024-09-10 DIAGNOSIS — M81.0 HIGH RISK FOR FRACTURE DUE TO OSTEOPOROSIS BY DEXA SCAN: Primary | ICD-10-CM

## 2024-09-10 DIAGNOSIS — M81.0 AGE-RELATED OSTEOPOROSIS WITHOUT CURRENT PATHOLOGICAL FRACTURE: ICD-10-CM

## 2024-09-10 PROCEDURE — 1159F MED LIST DOCD IN RCRD: CPT | Mod: CPTII,95,, | Performed by: NURSE PRACTITIONER

## 2024-09-10 PROCEDURE — 99214 OFFICE O/P EST MOD 30 MIN: CPT | Mod: 95,,, | Performed by: NURSE PRACTITIONER

## 2024-09-10 PROCEDURE — 1160F RVW MEDS BY RX/DR IN RCRD: CPT | Mod: CPTII,95,, | Performed by: NURSE PRACTITIONER

## 2024-09-12 ENCOUNTER — TELEPHONE (OUTPATIENT)
Dept: ORTHOPEDICS | Facility: CLINIC | Age: 59
End: 2024-09-12
Payer: COMMERCIAL

## 2024-09-13 ENCOUNTER — PATIENT MESSAGE (OUTPATIENT)
Dept: OBSTETRICS AND GYNECOLOGY | Facility: CLINIC | Age: 59
End: 2024-09-13
Payer: COMMERCIAL

## 2024-10-14 ENCOUNTER — PATIENT MESSAGE (OUTPATIENT)
Dept: UROGYNECOLOGY | Facility: CLINIC | Age: 59
End: 2024-10-14
Payer: COMMERCIAL

## 2024-10-14 RX ORDER — METHENAMINE, SODIUM PHOSPHATE, MONOBASIC, ANHYDROUS, PHENYL SALICYLATE, METHYLENE BLUE AND HYOSCYAMINE SULFATE 118; 40.8; 36; 10; .12 MG/1; MG/1; MG/1; MG/1; MG/1
1 CAPSULE ORAL 4 TIMES DAILY PRN
Qty: 120 CAPSULE | Refills: 3 | Status: SHIPPED | OUTPATIENT
Start: 2024-10-14

## 2024-10-15 RX ORDER — METHENAMINE, SODIUM PHOSPHATE, MONOBASIC, ANHYDROUS, PHENYL SALICYLATE, METHYLENE BLUE AND HYOSCYAMINE SULFATE 118; 40.8; 36; 10; .12 MG/1; MG/1; MG/1; MG/1; MG/1
1 CAPSULE ORAL 4 TIMES DAILY PRN
Qty: 120 CAPSULE | Refills: 3 | OUTPATIENT
Start: 2024-10-15

## 2024-10-22 ENCOUNTER — CLINICAL SUPPORT (OUTPATIENT)
Dept: UROGYNECOLOGY | Facility: CLINIC | Age: 59
End: 2024-10-22
Payer: COMMERCIAL

## 2024-10-22 VITALS
HEART RATE: 76 BPM | BODY MASS INDEX: 18.69 KG/M2 | WEIGHT: 112.19 LBS | HEIGHT: 65 IN | DIASTOLIC BLOOD PRESSURE: 74 MMHG | SYSTOLIC BLOOD PRESSURE: 127 MMHG

## 2024-10-22 DIAGNOSIS — N30.10 IC (INTERSTITIAL CYSTITIS): Primary | ICD-10-CM

## 2024-10-22 LAB
BILIRUB SERPL-MCNC: NORMAL MG/DL
BLOOD URINE, POC: NORMAL
CLARITY, POC UA: CLEAR
COLOR, POC UA: YELLOW
GLUCOSE UR QL STRIP: NORMAL
KETONES UR QL STRIP: NORMAL
LEUKOCYTE ESTERASE URINE, POC: NORMAL
NITRITE, POC UA: NORMAL
PH, POC UA: 6
PROTEIN, POC: NORMAL
SPECIFIC GRAVITY, POC UA: 1.01
UROBILINOGEN, POC UA: NORMAL

## 2024-10-22 PROCEDURE — 99999 PR PBB SHADOW E&M-EST. PATIENT-LVL III: CPT | Mod: PBBFAC,,,

## 2024-10-22 RX ORDER — BUPIVACAINE HYDROCHLORIDE 5 MG/ML
20 INJECTION, SOLUTION EPIDURAL; INTRACAUDAL
Status: COMPLETED | OUTPATIENT
Start: 2024-10-22 | End: 2024-10-22

## 2024-10-22 RX ORDER — TRIAMCINOLONE ACETONIDE 40 MG/ML
40 INJECTION, SUSPENSION INTRA-ARTICULAR; INTRAMUSCULAR
Status: COMPLETED | OUTPATIENT
Start: 2024-10-22 | End: 2024-10-22

## 2024-10-22 RX ORDER — SODIUM BICARBONATE 42 MG/ML
2.5 INJECTION, SOLUTION INTRAVENOUS ONCE
Status: COMPLETED | OUTPATIENT
Start: 2024-10-22 | End: 2024-10-22

## 2024-10-22 RX ORDER — HEPARIN SODIUM 5000 [USP'U]/ML
10000 INJECTION, SOLUTION INTRAVENOUS; SUBCUTANEOUS
Status: COMPLETED | OUTPATIENT
Start: 2024-10-22 | End: 2024-10-22

## 2024-10-22 RX ADMIN — BUPIVACAINE HYDROCHLORIDE 100 MG: 5 INJECTION, SOLUTION EPIDURAL; INTRACAUDAL at 03:10

## 2024-10-22 RX ADMIN — TRIAMCINOLONE ACETONIDE 40 MG: 40 INJECTION, SUSPENSION INTRA-ARTICULAR; INTRAMUSCULAR at 03:10

## 2024-10-22 RX ADMIN — HEPARIN SODIUM 10000 UNITS: 5000 INJECTION, SOLUTION INTRAVENOUS; SUBCUTANEOUS at 03:10

## 2024-10-22 RX ADMIN — SODIUM BICARBONATE 5 ML: 42 INJECTION, SOLUTION INTRAVENOUS at 03:10

## 2024-10-22 NOTE — PROGRESS NOTES
A #14 red rubber cath inserted into bladder using sterile technique. The urethral meatus was prepped with betadine prior to cath insertion.  60 cc clear yellow urine drained from bladder.  A solution of  5% marcaine 20 mL, heparin 10,000 IU/ml 2 mL, kenalog 40 mg, and sodium bicarb 4.2% (2.5 mEq) was instilled into bladder without difficulty. Procedure was tolerated very well. She was instructed to hold urine for one hour before voiding.

## 2024-11-05 ENCOUNTER — OFFICE VISIT (OUTPATIENT)
Dept: URGENT CARE | Facility: CLINIC | Age: 59
End: 2024-11-05
Payer: COMMERCIAL

## 2024-11-05 VITALS
RESPIRATION RATE: 20 BRPM | WEIGHT: 110 LBS | HEIGHT: 66 IN | OXYGEN SATURATION: 97 % | DIASTOLIC BLOOD PRESSURE: 72 MMHG | TEMPERATURE: 98 F | HEART RATE: 88 BPM | BODY MASS INDEX: 17.68 KG/M2 | SYSTOLIC BLOOD PRESSURE: 103 MMHG

## 2024-11-05 DIAGNOSIS — L03.011 PARONYCHIA OF FINGER OF RIGHT HAND: ICD-10-CM

## 2024-11-05 DIAGNOSIS — R05.9 COUGH, UNSPECIFIED TYPE: ICD-10-CM

## 2024-11-05 DIAGNOSIS — U07.1 COVID-19: Primary | ICD-10-CM

## 2024-11-05 DIAGNOSIS — R06.2 WHEEZING: ICD-10-CM

## 2024-11-05 LAB
CTP QC/QA: YES
SARS-COV-2 AG RESP QL IA.RAPID: POSITIVE

## 2024-11-05 PROCEDURE — 99214 OFFICE O/P EST MOD 30 MIN: CPT | Mod: S$GLB,,,

## 2024-11-05 PROCEDURE — 87811 SARS-COV-2 COVID19 W/OPTIC: CPT | Mod: QW,S$GLB,,

## 2024-11-05 RX ORDER — ALBUTEROL SULFATE 90 UG/1
2 INHALANT RESPIRATORY (INHALATION) EVERY 4 HOURS PRN
Qty: 18 G | Refills: 0 | Status: SHIPPED | OUTPATIENT
Start: 2024-11-05

## 2024-11-05 RX ORDER — MUPIROCIN 20 MG/G
OINTMENT TOPICAL 3 TIMES DAILY
Qty: 30 G | Refills: 0 | Status: SHIPPED | OUTPATIENT
Start: 2024-11-05

## 2024-11-05 RX ORDER — PROMETHAZINE HYDROCHLORIDE AND DEXTROMETHORPHAN HYDROBROMIDE 6.25; 15 MG/5ML; MG/5ML
5 SYRUP ORAL EVERY 4 HOURS PRN
Qty: 118 ML | Refills: 0 | Status: SHIPPED | OUTPATIENT
Start: 2024-11-05

## 2024-11-05 NOTE — PROGRESS NOTES
"Subjective:      Patient ID: Tara Unger is a 59 y.o. female.    Vitals:  height is 5' 6" (1.676 m) and weight is 49.9 kg (110 lb). Her oral temperature is 98.1 °F (36.7 °C). Her blood pressure is 103/72 and her pulse is 88. Her respiration is 20 and oxygen saturation is 97%.     Chief Complaint: Sinus Problem (Runny nose, sinus pressure and body aches recently traveled overseas)    Pt presents with  slight cough, congestion, facial pressure, runny nose, head ache, right ear muffled with pain and body aches, slightly scratchy throat. Pt states she just returned from an overseas trip Friday evening, started feeling sick last night felt worse this morning.    Provider note starts below:  Patient presents to clinic for evaluation of two complaints. First complaint is generalized body aches, chills, fatigue, cough, congestion, rhinorrhea, headache, sore throat, wheezing, which onset 2 days ago. Patient states she recently traveled out of the country and returned home 5 days ago. She has been taking tylenol and over the counter cold/cough medicine with no improvement of symptoms. Denies any fever, CP, palpitations, SOB, dizziness, lightheadedness. Patient's second complaint is pain, redness, swelling, and discharge surrounding right middle finger nail. States she has drained the wound at home twice since onset. Symptoms have been improving since then, however wants to be sure no other treatment is needed.     Sinus Problem  This is a new problem. The current episode started in the past 7 days. The problem has been rapidly worsening since onset. There has been no fever. Her pain is at a severity of 5/10. Associated symptoms include chills, congestion, coughing, headaches, sinus pressure and a sore throat. Pertinent negatives include no diaphoresis, ear pain, hoarse voice, neck pain, shortness of breath, sneezing or swollen glands. Treatments tried: OTC NIGHT QUIL. The treatment provided no relief.       Constitution: " Refill needed to [id number]    Lisdexamfetamine Dimesylate (VYVANSE) 40 MG Oral Cap       Future Appointments   Date Time Provider Zoltan Leblanc   12/2/2020 10:00 AM Ne Squires MD [facility] [place] Positive for chills and fatigue. Negative for sweating and fever.   HENT:  Positive for congestion, sinus pressure and sore throat. Negative for ear pain, ear discharge, trouble swallowing and voice change.    Neck: Negative for neck pain and neck stiffness.   Cardiovascular:  Negative for chest pain and palpitations.   Eyes:  Negative for eye pain and eye redness.   Respiratory:  Positive for cough and wheezing. Negative for shortness of breath.    Gastrointestinal:  Negative for nausea, vomiting and diarrhea.   Musculoskeletal:  Positive for muscle ache. Negative for muscle cramps.   Skin:  Positive for wound (R middle finger). Negative for pale and rash.   Allergic/Immunologic: Negative for itching and sneezing.   Neurological:  Positive for headaches. Negative for dizziness, light-headedness, passing out, disorientation and altered mental status.   Psychiatric/Behavioral:  Negative for altered mental status, disorientation and confusion.       Objective:     Physical Exam   Constitutional: She is oriented to person, place, and time.  Non-toxic appearance. She does not appear ill. No distress.   HENT:   Head: Normocephalic and atraumatic.   Ears:   Right Ear: Tympanic membrane, external ear and ear canal normal.   Left Ear: Tympanic membrane, external ear and ear canal normal.   Nose: Rhinorrhea and congestion present.   Mouth/Throat: Mucous membranes are moist. No oropharyngeal exudate or posterior oropharyngeal erythema. Oropharynx is clear.   Eyes: Conjunctivae are normal. Extraocular movement intact   Neck: Neck supple.   Cardiovascular: Normal rate, regular rhythm, normal heart sounds and normal pulses.   No murmur heard.Exam reveals no gallop and no friction rub.   Pulmonary/Chest: Effort normal. No stridor. No respiratory distress. She has wheezes (end expiratory bilaterally). She has no rhonchi. She has no rales. She exhibits no tenderness.   Abdominal: Normal appearance.   Musculoskeletal: Normal range  of motion.         General: Normal range of motion.   Neurological: She is alert, oriented to person, place, and time and at baseline.   Skin: Skin is warm and dry.         Comments: Mild erythema, swelling, and tenderness to lateral nailfold of right middle finger, consistent with paronychia. No palpable fluctuance.    Psychiatric: Her behavior is normal. Mood normal.   Nursing note and vitals reviewed.    Assessment:     Results for orders placed or performed in visit on 11/05/24   SARS Coronavirus 2 Antigen, POCT Manual Read    Collection Time: 11/05/24 10:05 AM   Result Value Ref Range    SARS Coronavirus 2 Antigen Positive (A) Negative     Acceptable Yes        1. COVID-19    2. Cough, unspecified type    3. Wheezing    4. Paronychia of finger of right hand        Plan:     COVID-19  -     promethazine-dextromethorphan (PROMETHAZINE-DM) 6.25-15 mg/5 mL Syrp; Take 5 mLs by mouth every 4 (four) hours as needed (for cough).  Dispense: 118 mL; Refill: 0  -     nirmatrelvir-ritonavir 300 mg (150 mg x 2)-100 mg copackaged tablets (EUA); Take 3 tablets by mouth 2 (two) times daily for 5 days. Each dose contains 2 nirmatrelvir (pink tablets) and 1 ritonavir (white tablet). Take all 3 tablets together  Dispense: 30 tablet; Refill: 0    Cough, unspecified type  -     SARS Coronavirus 2 Antigen, POCT Manual Read    Wheezing  -     albuterol (VENTOLIN HFA) 90 mcg/actuation inhaler; Inhale 2 puffs into the lungs every 4 (four) hours as needed for Wheezing or Shortness of Breath. Rescue  Dispense: 18 g; Refill: 0  -     inhalation spacing device; Use as directed for inhalation.  Dispense: 1 each; Refill: 0    Paronychia of finger of right hand  -     mupirocin (BACTROBAN) 2 % ointment; Apply topically 3 (three) times daily.  Dispense: 30 g; Refill: 0            Patient Instructions   Paronychia  Paronychia is an infection of the skin near the fingernails or toenails. The skin around your nail may be sore, red,  or swollen. You may have small blisters near your nail.     Plan of Care  Soak your nail in warm water for 20 minutes, 3 times each day.  Put an antibiotic cream or ointment on the infected area after soaking. Then, place a clean, dry dressing over the area.  Try not to bite your nails or cut your cuticles. Doing these things can increase your risk of getting another infection in the nail area.    Return to clinic if  You have a fever of 100.4°F (38°C) or higher or chills.  You have worsening of the infection around your nail, like swelling, redness, warmth, pain, or fluid draining from the wound.    COVID-19  New CDC guidelines pertaining to COVID-19 as of March 1, 2024 are as follows:  You can go back to your normal activities when, for at least 24 hours, both are true:  Your symptoms are getting better overall, and  You have not had a fever (and are not using fever-reducing medication).  Keep in mind that you may still be able to spread the virus that made you sick, even if you are feeling better. You are likely to be less contagious at this time, depending on factors like how long you were sick or how sick you were.  When you go back to your normal activities, take added precaution over the next 5 days, such as taking additional steps for  air, hygiene, masks, physical distancing, and/or testing when you will be around other people indoors.  If you develop a fever or you start to feel worse after you have gone back to normal activities, stay home and away from others again until, for at least 24 hours, both are true: your symptoms are improving overall, and you have not had a fever (and are not using fever-reducing medication). Then take added precaution for the next 5 days.    I have prescribed Paxlovid, which is an antiviral drug used to treat COVID-19.  If available, please take as prescribed to help reduce risk of complications. If not available, you can take medications discussed to treat specific  symptoms. Reviewed medications with patient and possible drug interactions with Paxlovid. If patient is on a statin for high cholesterol; please hold medication for 10 days while taking Paxlovid.Paxlovid is given twice daily for 5 days, starting as soon as possible and within 5 days of symptom onset, and is approved for use in adults and authorized for use in pediatric patients (12 years of age and older weighing at least 40kg). The most common side effects of PAXLOVID include: altered sense of taste and diarrhea. Other possible side effects include: headache, vomiting, abdominal pain, nausea,high blood pressure, and feeling generally unwell. PAXLOVID may cause serious side effects, including:Allergic reactions, including severe allergic reactions (anaphylaxis),skin rash, hives, blisters or peeling  skin, painful sores or ulcers in the mouth, nose, throat or genital area, and/ swelling of the mouth, lips, tongue or face.    Promethazine DM, 5 mLs every 4 hours as needed for cough.  Albuterol inhaler, 2 puffs into lungs, every 4-6 hours as needed for shortness of breath or wheezing.   Recommend over the counter oral antihistamine (zyrtec, allegra, claritin) + nasal decongestant (pseudoephedrine, phenylephrine) to help with congestion and sinus pressure.  If not allergic, take Tylenol (Acetaminophen) 650 mg to  1 g every 6 hours as needed for fever and/or Motrin (Ibuprofen) 600 to 800 mg every 6 hours as needed for fever as directed for control of pain and/or fever  Please drink plenty of fluids.  Please get plenty of rest.  Nasal irrigation with a saline spray or Netti Pot like device per their directions is also recommended.  If you smoke, please stop smoking.    To help ease a sore throat, you can:  Use a sore throat spray.  Suck on hard candy or throat lozenges.  Gargle with warm saltwater a few times each day. Mix of 1/4 teaspoon (1.25 grams) salt in 8 ounces (240 mL) of warm water.  Use a cool mist humidifier to  help you breathe easier.    Please remember that you have received care at an urgent care today. Urgent cares are not emergency rooms and are not equipped to handle life threatening emergencies and cannot rule in or out certain medical conditions and you may be released before all of your medical problems are known or treated.     Please arrange follow up with your primary care physician or speciality clinic within 2-5 days if your signs and symptoms have not resolved or worsen.     Patient can call our Referral Hotline at [phone] to make an appointment.    Please return here or go to the Emergency Department for any concerns or worsening of condition.  Signs of infection. These include a fever of 100.4°F (38°C) or higher, chills, cough, more sputum or change in color of sputum.  You are having so much trouble breathing that you can only say one or two words at a time.  You need to sit upright at all times to be able to breathe and or cannot lie down.  You have trouble breathing when talking or sitting still.  You have a fever of 100.4°F (38°C) or higher or chills.  You have chest pain when you cough, have trouble breathing but can still talk in full sentences, or cough up blood.

## 2024-11-05 NOTE — PATIENT INSTRUCTIONS
Paronychia  Paronychia is an infection of the skin near the fingernails or toenails. The skin around your nail may be sore, red, or swollen. You may have small blisters near your nail.     Plan of Care  Soak your nail in warm water for 20 minutes, 3 times each day.  Put an antibiotic cream or ointment on the infected area after soaking. Then, place a clean, dry dressing over the area.  Try not to bite your nails or cut your cuticles. Doing these things can increase your risk of getting another infection in the nail area.    Return to clinic if  You have a fever of 100.4°F (38°C) or higher or chills.  You have worsening of the infection around your nail, like swelling, redness, warmth, pain, or fluid draining from the wound.    COVID-19  New CDC guidelines pertaining to COVID-19 as of March 1, 2024 are as follows:  You can go back to your normal activities when, for at least 24 hours, both are true:  Your symptoms are getting better overall, and  You have not had a fever (and are not using fever-reducing medication).  Keep in mind that you may still be able to spread the virus that made you sick, even if you are feeling better. You are likely to be less contagious at this time, depending on factors like how long you were sick or how sick you were.  When you go back to your normal activities, take added precaution over the next 5 days, such as taking additional steps for  air, hygiene, masks, physical distancing, and/or testing when you will be around other people indoors.  If you develop a fever or you start to feel worse after you have gone back to normal activities, stay home and away from others again until, for at least 24 hours, both are true: your symptoms are improving overall, and you have not had a fever (and are not using fever-reducing medication). Then take added precaution for the next 5 days.    I have prescribed Paxlovid, which is an antiviral drug used to treat COVID-19.  If available, please  take as prescribed to help reduce risk of complications. If not available, you can take medications discussed to treat specific symptoms. Reviewed medications with patient and possible drug interactions with Paxlovid. If patient is on a statin for high cholesterol; please hold medication for 10 days while taking Paxlovid.Paxlovid is given twice daily for 5 days, starting as soon as possible and within 5 days of symptom onset, and is approved for use in adults and authorized for use in pediatric patients (12 years of age and older weighing at least 40kg). The most common side effects of PAXLOVID include: altered sense of taste and diarrhea. Other possible side effects include: headache, vomiting, abdominal pain, nausea,high blood pressure, and feeling generally unwell. PAXLOVID may cause serious side effects, including:Allergic reactions, including severe allergic reactions (anaphylaxis),skin rash, hives, blisters or peeling  skin, painful sores or ulcers in the mouth, nose, throat or genital area, and/ swelling of the mouth, lips, tongue or face.    Promethazine DM, 5 mLs every 4 hours as needed for cough.  Albuterol inhaler, 2 puffs into lungs, every 4-6 hours as needed for shortness of breath or wheezing.   Recommend over the counter oral antihistamine (zyrtec, allegra, claritin) + nasal decongestant (pseudoephedrine, phenylephrine) to help with congestion and sinus pressure.  If not allergic, take Tylenol (Acetaminophen) 650 mg to  1 g every 6 hours as needed for fever and/or Motrin (Ibuprofen) 600 to 800 mg every 6 hours as needed for fever as directed for control of pain and/or fever  Please drink plenty of fluids.  Please get plenty of rest.  Nasal irrigation with a saline spray or Netti Pot like device per their directions is also recommended.  If you smoke, please stop smoking.    To help ease a sore throat, you can:  Use a sore throat spray.  Suck on hard candy or throat lozenges.  Gargle with warm  saltwater a few times each day. Mix of 1/4 teaspoon (1.25 grams) salt in 8 ounces (240 mL) of warm water.  Use a cool mist humidifier to help you breathe easier.    Please remember that you have received care at an urgent care today. Urgent cares are not emergency rooms and are not equipped to handle life threatening emergencies and cannot rule in or out certain medical conditions and you may be released before all of your medical problems are known or treated.     Please arrange follow up with your primary care physician or speciality clinic within 2-5 days if your signs and symptoms have not resolved or worsen.     Patient can call our Referral Hotline at [phone] to make an appointment.    Please return here or go to the Emergency Department for any concerns or worsening of condition.  Signs of infection. These include a fever of 100.4°F (38°C) or higher, chills, cough, more sputum or change in color of sputum.  You are having so much trouble breathing that you can only say one or two words at a time.  You need to sit upright at all times to be able to breathe and or cannot lie down.  You have trouble breathing when talking or sitting still.  You have a fever of 100.4°F (38°C) or higher or chills.  You have chest pain when you cough, have trouble breathing but can still talk in full sentences, or cough up blood.

## 2024-12-03 NOTE — PROGRESS NOTES
No chief complaint on file.      History of Present Illness: Tara Unger is a 59 y.o. female that presents today 12/2/2024 for a bone density consultation via virtual visit. Recent DEXA scan with significant osteoporosis of the lumbar spine and elevation fo z-score: -2.3. Compliant with Vitamin D, active lifestyle. Mother with history of hip fracture.      Bone Density Results:   Results for orders placed during the hospital encounter of 08/09/24    DXA Bone Density Axial Skeleton 1 or more sites    Narrative  EXAMINATION:  DXA BONE DENSITY AXIAL SKELETON 1 OR MORE SITES    CLINICAL HISTORY:  Encounter for screening for osteoporosis.  No reported history of fractures. No reported treatments for osteoporosis.  Patient's mother had a hip fracture.    TECHNIQUE:  DXA specification: [facility] A ([id number])    Bone Mineral Density scanning was performed over the hip and lumbar spine.    Review of the images confirms satisfactory positioning and technique.    COMPARISON:  No prior comparison available    FINDINGS:  Lumbar spine (L1-L4):               T-score is -3.8, and Z-score is -2.3.    Femoral neck:                          T-score is -2.0, and Z-score is -0.7.    Total hip:                                T-score is -2.4, and Z-score is -1.4.      Fracture Risk (FRAX)    15% risk of a major osteoporotic fracture in the next 10 years.    1.1% risk of hip fracture in the next 10 years.    Impression  *Osteoporosis based on T-score below -2.5  *Fracture risk is very high due to T-score below -3.0.    RECOMMENDATIONS:  *Daily calcium intake 4321-5544 mg, dietary sources preferred; Vitamin D 4773-5043 IU daily.  *Weight bearing exercise and fall precautions.  *Recommend workup for secondary causes of osteoporosis if not already done.  Consider endocrinology consult.  *Given very high fracture risk, would consider anabolic agents (including teriparatide, abaloparatide, or romosozumab), denosumab  or zoledronic acid as the preferred treatment options. Oral bisphosphonates can be considered as second-line therapy.  *Repeat BMD in 2 years.      Electronically signed by: Saurabh Gasca  Date:    08/15/2024  Time:    12:00      LABS:  Last Calcium   Lab Results   Component Value Date    CALCIUM 9.7 03/01/2023       Last Vitamin D   Lab Results   Component Value Date    [id number] 63 05/24/2023             Past Medical History:   Diagnosis Date    Osteoporosis     Urinary tract infection     Uterine fibroid        Past Surgical History:   Procedure Laterality Date    HYSTERECTOMY      2014    PELVIC LAPAROSCOPY      uterine septum repair    RHINOPLASTY TIP  1980    TUBAL LIGATION  December 1999    Postpartum    umbilical cyst  2004       Current Outpatient Medications   Medication Sig Dispense Refill    albuterol (VENTOLIN HFA) 90 mcg/actuation inhaler Inhale 2 puffs into the lungs every 4 (four) hours as needed for Wheezing or Shortness of Breath. Rescue 18 g 0    ciprofloxacin HCl (CIPRO) 500 MG tablet Take 500 mg by mouth every 12 (twelve) hours.      estradioL (DIVIGEL) 0.25 mg/0.25 gram (0.1 %) GlPk Place 1 packet onto the skin once daily. 30 packet 11    fluticasone propionate (FLONASE) 50 mcg/actuation nasal spray 2 sprays (100 mcg total) by Each Nostril route once daily. 15 g 0    gabapentin (NEURONTIN) 100 MG capsule Take 1 capsule (100 mg total) by mouth 3 (three) times daily. 90 capsule 11    hydrOXYzine HCL (ATARAX) 10 MG Tab Take 1 tablet (10 mg total) by mouth nightly. (Patient not taking: Reported on 7/1/2024) 30 tablet 11    inhalation spacing device Use as directed for inhalation. 1 each 0    LIDOCAINE 2 %, VALIUM 5 MG, BACLOFEN 4 % SUPPOSITORY Place 1 suppository vaginally 2 (two) times daily as needed (bladder pain). (Patient not taking: Reported on 10/22/2024) 10 each 5    mirabegron (MYRBETRIQ) 50 mg Tb24 Take 1 tablet (50 mg total) by mouth once daily. 30 tablet 11    mupirocin (BACTROBAN)  2 % ointment Apply topically 3 (three) times daily. 30 g 0    omega-3 fatty acids 1,000 mg Cap Take 2 g by mouth.      prasterone, dhea, (INTRAROSA) 6.5 mg Inst INSERT 1 TABLET VAGINALLY ONCE DAILY 28 each 11    promethazine-dextromethorphan (PROMETHAZINE-DM) 6.25-15 mg/5 mL Syrp Take 5 mLs by mouth every 4 (four) hours as needed (for cough). 118 mL 0    UNABLE TO FIND medication name: Calm      UNABLE TO FIND BENZOCAINE/CHLORBUTANOL/GUAIACOL OINTMENT (Patient not taking: Reported on 10/22/2024)      URO--10-40.8-36 mg Cap TAKE 1 CAPSULE BY MOUTH FOUR TIMES DAILY AS NEEDED 120 capsule 3     No current facility-administered medications for this visit.       Review of patient's allergies indicates:  No Known Allergies    Family History   Problem Relation Name Age of Onset    Breast cancer Maternal Cousin Andreea     Ovarian cancer Maternal Cousin Euginie     Colon cancer Neg Hx      Diabetes Neg Hx      Hypertension Neg Hx      Stroke Neg Hx      Cervical cancer Neg Hx      Endometrial cancer Neg Hx      Vaginal cancer Neg Hx         Social History     Tobacco Use    Smoking status: Never    Smokeless tobacco: Never   Substance Use Topics    Alcohol use: No     Comment: rare    Drug use: No       OB History    Para Term  AB Living   6 4 3 1 2 3   SAB IAB Ectopic Multiple Live Births   2       3      # Outcome Date GA Lbr Tj/2nd Weight Sex Type Anes PTL Lv   6 Term            5 SAB            4 SAB            3 Term  38w0d    Vag-Spont   YORYD   2 Term  37w0d    Vag-Spont   YORDY   1   35w0d    Vag-Spont   YORDY       Review of Symptoms:  GENERAL: Denies weight gain or weight loss. Feeling well overall.   SKIN: Denies rash or lesions.   HEAD: Denies head injury or headache.   NODES: Denies enlarged lymph nodes.   CHEST: Denies chest pain or shortness of breath.   CARDIOVASCULAR: Denies palpitations or left sided chest pain.   ABDOMEN: No abdominal pain, constipation, diarrhea, nausea, vomiting or  rectal bleeding.   URINARY: No frequency, dysuria, hematuria, or burning on urination.  HEMATOLOGIC: No easy bruisability or excessive bleeding.   MUSCULOSKELETAL: Denies joint pain or swelling.     LMP 11/30/2014   Physical Exam:  APPEARANCE: Well nourished, well developed, in no acute distress.  SKIN: Normal skin turgor, no lesions.  NECK: Neck symmetric without masses   RESPIRATORY: Normal respiratory effort with no retractions or use of accessory muscles  CARDIOVASCULAR: Peripheral vascular system with no swelling no varicosities and palpation of pulses normal  LYMPHATIC: No enlargements of the lymph nodes noted in the neck, axillae, or groin  ABDOMEN: Soft. No tenderness or masses. No hepatosplenomegaly. No hernias.  BREASTS: Symmetrical, no skin changes or visible lesions. No palpable masses, nipple discharge or adenopathy bilaterally.  PELVIC: Normal external female genitalia without lesions. Normal hair distribution. Adequate perineal body, normal urethral meatus. Urethra with no masses.  Bladder nontender. Vagina moist and well rugated without lesions or discharge. Cervix pink and without lesions. No significant cystocele or rectocele. Bimanual exam showed uterus normal size, shape, position, mobile and nontender. Adnexa without masses or tenderness. Urethra and bladder normal.  EXTREMITIES: No clubbing cyanosis or edema.    ASSESSMENT/PLAN:  High risk for fracture due to osteoporosis by DEXA scan  -     Ambulatory referral/consult to Endocrinology; Future; Expected date: 09/17/2024    Age-related osteoporosis without current pathological fracture  -     Ambulatory referral/consult to Endocrinology; Future; Expected date: 09/17/2024           Risk factors in bold   Race: White  Female   There is no height or weight on file to calculate BMI.  Postmenopausal   History for fractures? Parental fractures or osteoporosis?    Exercise?  Steroids? Anticonvulsants? Chemo?   Smoker ?   Alochol/ Caffeine intake?    Diet/supplements?         Recommendations  Limit caffeine and alcohol intake   Nutrition/foods high in calcium and vit D (handout provided)  Calcium (1200mg/d) and Vit D supplements.   Adequate protein (to reduce potential for fractures and promote fracture healing in older adults)  No smoking/avoid second hand smoke   Weight bearing, resistance exercise and aerobics - spine  Walking - hip       Due to abnormal z-score with significant osteoporosis, will refer to Endocrinology for additional assessment and therapy recommendations.    FU with NP as needed.       LILIANE Norris    The patient location is: [place]  The chief complaint leading to consultation is: DEXA    Visit type: audiovisual    Face to Face time with patient: 15 minutes  20 minutes of total time spent on the encounter, which includes face to face time and non-face to face time preparing to see the patient (eg, review of tests), Obtaining and/or reviewing separately obtained history, Documenting clinical information in the electronic or other health record, Independently interpreting results (not separately reported) and communicating results to the patient/family/caregiver, or Care coordination (not separately reported).       Each patient to whom he or she provides medical services by telemedicine is:  (1) informed of the relationship between the physician and patient and the respective role of any other health care provider with respect to management of the patient; and (2) notified that he or she may decline to receive medical services by telemedicine and may withdraw from such care at any time.

## 2025-01-21 ENCOUNTER — OFFICE VISIT (OUTPATIENT)
Dept: ENDOCRINOLOGY | Facility: CLINIC | Age: 60
End: 2025-01-21
Payer: COMMERCIAL

## 2025-01-21 DIAGNOSIS — M81.0 AGE-RELATED OSTEOPOROSIS WITHOUT CURRENT PATHOLOGICAL FRACTURE: ICD-10-CM

## 2025-01-21 DIAGNOSIS — M81.0 HIGH RISK FOR FRACTURE DUE TO OSTEOPOROSIS BY DEXA SCAN: ICD-10-CM

## 2025-01-21 NOTE — PATIENT INSTRUCTIONS
"Labs and collect 24 hour urine collection   HOW TO COLLECT AN ACCURATE   24 HOUR URINE SPECIMEN     I want you to collect EVERY drop of your urine during a 24 hour period. I do not care what the volume of the urine is as long as it represents every drop that you pass during that 24 hour period. If you need to have a bowel movement, you may separate the urine but I want every drop.     Begin the collection on a morning which is convenient for you. At that time, pass your urine, flush it down the toilet and note the exact time. Now you have an empty bladder and empty bottle. That starts the collection. Collect every drop all during the day and night until exactly the same time the next morning, when you should pass your urine and add it to the bottle.     Bring the closed container back to the same laboratory that issued the empty container.      Osteoporosis medications  These are the medications we discussed for osteoporosis treatment:    - Bisphosphonates:         - these slow down bone loss         - oral forms (Fosamax and Actonel are examples) - taken once weekly or once monthly         - IV form (Reclast) - given intravenously once yearly    - Prolia (denosumab):          - slows down bone loss           - it is a subcutaneous injection (under the skin) every 6 months, given in the office    - Forteo (teriparatide) or Tymlos (abaloparatide):              - medications that "build bone" or increases bone formation              - subcutaneous injection (under the skin) taken once daily that you self-administer at home   - needs a medicine to "seal" in the bone you have gained, typically we use a bisphosphonate or prolia    For more information on medications: https://[email or web address].org/patients/treatment/medicationadherence/    "

## 2025-01-21 NOTE — ASSESSMENT & PLAN NOTE
Risk factors: postmenopausal status, , family history and low body weight.    No previous fragility fractures   FRAX not elevated     Due for DXA scan in 2026, discussed importance of scheduling in the same location to allow for comparison    Secondary evaluation: PTH, renal function, 24 hr urine for hakeem/creat  Consider TTG/IgA, testosterone, ferritin, tryptase, hypercortisolism  Serum calcium, creatinine, thyroid function tests and liver enzymes are normal.     Continue calcium and vitamin D  Continue to stay active, exercise and focus on strength and core    Treatment options and potential side effects discussed for PO bisphosphonates, Reclast, Prolia.     Discussed risk of osteonecrosis of the jaw and AFF with bisphosphonates and denosumab. Despite low risk the significant benefit on fracture reduction far outweighs the potential for this rare event.

## 2025-01-21 NOTE — PROGRESS NOTES
"ENDOCRINOLOGY INITIAL VISIT  01/21/2025    Reason for Visit:  referred by Carri Corbin NP for evaluation of osteoporosis     HPI:  Tara Unger is a 59 y.o. female    With regards to osteoporosis:     Diagnosed with osteoporosis based on bone density test.    Osteoporosis medication history:   None    Calcium supplements?  Two TUMS a day  Consumes dairy products regularly? Yes  Eats fresh green vegetables regularly? Yes    Vit D3 intake?  2000 IU per day     Lab Results   Component Value Date    [id number] 63 05/24/2023       Weight bearing exercise?   Yes, strength and weight bearing activities; PNA this year which did affect her exercise regimen  Sense of balance? good  Recent falls? no  Patient is using the following assist devices for ambulation: none  Height loss (>2 inches)? no    Fracture history: denies     Tobacco use?  no  EtOH use?   no     Current diarrhea or h/o malabsorption? no  GERD or stomach ulcers? no  Thyroid disease? No  Has thyroid nodules, followed with ENT  Anemia? no  Kidney Stones? no  Hyperparathyroidism? no    High risk medications include:  Prednisone   mg x   day(s) - for PNA in 2023 and in 2024     Post-menopausal? Age?: OCPs for a few years, however underwent hysterectomy 8 years ago  ERT after menopause?: no    Mom or dad with hip fracture?: yes, mother with three fractures -> hip/pelvis    Family history of osteoporosis?: mother      Malignancy involving bone (active or history)? no  Prior radiation treatment? no  Unexplained elevation of alkaline phosphatase? no    Dental work planned? no    Lab Results   Component Value Date    [id number] 63 05/24/2023    CALCIUM 9.7 03/01/2023    CALCIUM 9.1 12/10/2014    CALCIUM 9.0 12/02/2014    PHOS 2.1 (L) 12/10/2014    ALKPHOS 69 03/01/2023         No results found for: "CTELOPEPTIDE", "PROCOLLAGEN"    FINDINGS:  Lumbar spine (L1-L4):               T-score is -3.8, and Z-score is -2.3.   Femoral neck:                          " T-score is -2.0, and Z-score is -0.7.   Total hip:                                T-score is -2.4, and Z-score is -1.4.      Fracture Risk (FRAX)   15% risk of a major osteoporotic fracture in the next 10 years.   1.1% risk of hip fracture in the next 10 years.      Review of patient's allergies indicates:  No Known Allergies      Current Outpatient Medications:     albuterol (VENTOLIN HFA) 90 mcg/actuation inhaler, Inhale 2 puffs into the lungs every 4 (four) hours as needed for Wheezing or Shortness of Breath. Rescue, Disp: 18 g, Rfl: 0    estradioL (DIVIGEL) 0.25 mg/0.25 gram (0.1 %) GlPk, Place 1 packet onto the skin once daily., Disp: 30 packet, Rfl: 11    fluticasone propionate (FLONASE) 50 mcg/actuation nasal spray, 2 sprays (100 mcg total) by Each Nostril route once daily., Disp: 15 g, Rfl: 0    gabapentin (NEURONTIN) 100 MG capsule, Take 1 capsule (100 mg total) by mouth 3 (three) times daily., Disp: 90 capsule, Rfl: 11    inhalation spacing device, Use as directed for inhalation., Disp: 1 each, Rfl: 0    mirabegron (MYRBETRIQ) 50 mg Tb24, Take 1 tablet (50 mg total) by mouth once daily., Disp: 30 tablet, Rfl: 11    mupirocin (BACTROBAN) 2 % ointment, Apply topically 3 (three) times daily., Disp: 30 g, Rfl: 0    omega-3 fatty acids 1,000 mg Cap, Take 2 g by mouth., Disp: , Rfl:     prasterone, dhea, (INTRAROSA) 6.5 mg Inst, INSERT 1 TABLET VAGINALLY ONCE DAILY, Disp: 28 each, Rfl: 11    promethazine-dextromethorphan (PROMETHAZINE-DM) 6.25-15 mg/5 mL Syrp, Take 5 mLs by mouth every 4 (four) hours as needed (for cough)., Disp: 118 mL, Rfl: 0    UNABLE TO FIND, medication name: Calm, Disp: , Rfl:     UNABLE TO FIND, BENZOCAINE/CHLORBUTANOL/GUAIACOL OINTMENT (Patient not taking: Reported on 10/22/2024), Disp: , Rfl:     URO--10-40.8-36 mg Cap, TAKE 1 CAPSULE BY MOUTH FOUR TIMES DAILY AS NEEDED, Disp: 120 capsule, Rfl: 3      ROS: see HPI       PHYSICAL EXAM  LMP 11/30/2014   Wt Readings from Last 3  Encounters:   11/05/24 49.9 kg (110 lb)   10/22/24 50.9 kg (112 lb 3.4 oz)   08/05/24 49.4 kg (109 lb)       IMAGING STUDIES    ASSESSMENT/PLAN    Problem List Items Addressed This Visit          1 - High    Age-related osteoporosis without current pathological fracture     Risk factors: postmenopausal status, , family history and low body weight.    No previous fragility fractures   FRAX not elevated     Due for DXA scan in 2026, discussed importance of scheduling in the same location to allow for comparison    Secondary evaluation: PTH, renal function, 24 hr urine for hakeem/creat  Consider TTG/IgA, testosterone, ferritin, tryptase, hypercortisolism  Serum calcium, creatinine, thyroid function tests and liver enzymes are normal.     Continue calcium and vitamin D  Continue to stay active, exercise and focus on strength and core    Treatment options and potential side effects discussed for PO bisphosphonates, Reclast, Prolia.     Discussed risk of osteonecrosis of the jaw and AFF with bisphosphonates and denosumab. Despite low risk the significant benefit on fracture reduction far outweighs the potential for this rare event.           Relevant Orders    Calcium, Timed Urine    C Telopeptide (CTX), Serum    PTH, Intact    Renal Function Panel    Tissue Transglutaminase, IgA    TSH    Vitamin D    Creatinine, urine, timed 24 Hours     Other Visit Diagnoses       High risk for fracture due to osteoporosis by DEXA scan                RTC 2 - 3 weeks virtual visit    Jacquelyn Ahuja MD      Physical exam was not performed and vitals were not obtained due to this being a telemedicine (virtual) visit.    The patient location is: home  The chief complaint leading to consultation is:   Visit type: Virtual visit with synchronous audio and video  Total time spent with patient: 41    RTC in 3 weeks for lab review      Jacquelyn Ahuja MD

## 2025-01-31 ENCOUNTER — LAB VISIT (OUTPATIENT)
Dept: LAB | Facility: HOSPITAL | Age: 60
End: 2025-01-31
Attending: INTERNAL MEDICINE
Payer: COMMERCIAL

## 2025-01-31 DIAGNOSIS — M81.0 AGE-RELATED OSTEOPOROSIS WITHOUT CURRENT PATHOLOGICAL FRACTURE: ICD-10-CM

## 2025-01-31 LAB
25(OH)D3+25(OH)D2 SERPL-MCNC: 59 NG/ML (ref 30–96)
ALBUMIN SERPL BCP-MCNC: 4.2 G/DL (ref 3.5–5.2)
ANION GAP SERPL CALC-SCNC: 10 MMOL/L (ref 8–16)
BUN SERPL-MCNC: 32 MG/DL (ref 6–20)
CALCIUM SERPL-MCNC: 9.6 MG/DL (ref 8.7–10.5)
CHLORIDE SERPL-SCNC: 106 MMOL/L (ref 95–110)
CO2 SERPL-SCNC: 24 MMOL/L (ref 23–29)
CREAT SERPL-MCNC: 0.7 MG/DL (ref 0.5–1.4)
EST. GFR  (NO RACE VARIABLE): >60 ML/MIN/1.73 M^2
GLUCOSE SERPL-MCNC: 81 MG/DL (ref 70–110)
PHOSPHATE SERPL-MCNC: 3.5 MG/DL (ref 2.7–4.5)
POTASSIUM SERPL-SCNC: 4.5 MMOL/L (ref 3.5–5.1)
SODIUM SERPL-SCNC: 140 MMOL/L (ref 136–145)
TSH SERPL DL<=0.005 MIU/L-ACNC: 0.78 UIU/ML (ref 0.4–4)

## 2025-01-31 PROCEDURE — 82523 COLLAGEN CROSSLINKS: CPT | Performed by: INTERNAL MEDICINE

## 2025-01-31 PROCEDURE — 86364 TISS TRNSGLTMNASE EA IG CLAS: CPT | Performed by: INTERNAL MEDICINE

## 2025-01-31 PROCEDURE — 83970 ASSAY OF PARATHORMONE: CPT | Performed by: INTERNAL MEDICINE

## 2025-01-31 PROCEDURE — 82306 VITAMIN D 25 HYDROXY: CPT | Performed by: INTERNAL MEDICINE

## 2025-01-31 PROCEDURE — 84443 ASSAY THYROID STIM HORMONE: CPT | Performed by: INTERNAL MEDICINE

## 2025-01-31 PROCEDURE — 80069 RENAL FUNCTION PANEL: CPT | Performed by: INTERNAL MEDICINE

## 2025-02-01 LAB — PTH-INTACT SERPL-MCNC: 54.3 PG/ML (ref 9–77)

## 2025-02-03 ENCOUNTER — LAB VISIT (OUTPATIENT)
Dept: LAB | Facility: HOSPITAL | Age: 60
End: 2025-02-03
Attending: INTERNAL MEDICINE
Payer: COMMERCIAL

## 2025-02-03 DIAGNOSIS — M81.0 AGE-RELATED OSTEOPOROSIS WITHOUT CURRENT PATHOLOGICAL FRACTURE: ICD-10-CM

## 2025-02-03 LAB
COLLAGEN CTX SERPL-MCNC: 268 PG/ML
CREAT 24H UR-MRATE: 44.2 MG/HR (ref 40–75)
CREAT UR-MCNC: 106 MG/DL (ref 15–325)
CREATININE, URINE (MG/SPEC): 1060 MG/SPEC
URINE COLLECTION DURATION: 24 HR
URINE VOLUME: 1000 ML

## 2025-02-03 PROCEDURE — 82570 ASSAY OF URINE CREATININE: CPT | Performed by: INTERNAL MEDICINE

## 2025-02-04 LAB — TTG IGA SER-ACNC: <0.2 U/ML

## 2025-02-11 ENCOUNTER — OFFICE VISIT (OUTPATIENT)
Dept: ENDOCRINOLOGY | Facility: CLINIC | Age: 60
End: 2025-02-11
Payer: COMMERCIAL

## 2025-02-11 DIAGNOSIS — M81.0 AGE-RELATED OSTEOPOROSIS WITHOUT CURRENT PATHOLOGICAL FRACTURE: Primary | ICD-10-CM

## 2025-02-11 PROCEDURE — G2211 COMPLEX E/M VISIT ADD ON: HCPCS | Mod: 95,,, | Performed by: INTERNAL MEDICINE

## 2025-02-11 PROCEDURE — 1159F MED LIST DOCD IN RCRD: CPT | Mod: CPTII,95,, | Performed by: INTERNAL MEDICINE

## 2025-02-11 PROCEDURE — 98006 SYNCH AUDIO-VIDEO EST MOD 30: CPT | Mod: 95,,, | Performed by: INTERNAL MEDICINE

## 2025-02-11 RX ORDER — ISOPROPYL ALCOHOL 70 ML/100ML
1 SWAB TOPICAL DAILY
Qty: 100 EACH | Refills: 3 | Status: SHIPPED | OUTPATIENT
Start: 2025-02-11

## 2025-02-11 RX ORDER — TERIPARATIDE 250 UG/ML
20 INJECTION, SOLUTION SUBCUTANEOUS DAILY
Qty: 2.4 ML | Refills: 11 | Status: SHIPPED | OUTPATIENT
Start: 2025-02-11 | End: 2026-02-11

## 2025-02-11 RX ORDER — PEN NEEDLE, DIABETIC 31 GX5/16"
NEEDLE, DISPOSABLE MISCELLANEOUS
Qty: 100 EACH | Refills: 3 | Status: SHIPPED | OUTPATIENT
Start: 2025-02-11

## 2025-02-11 NOTE — PROGRESS NOTES
ENDOCRINOLOGY INITIAL VISIT  02/11/2025    Reason for Visit:  referred by No ref. provider found for evaluation of osteoporosis     HPI:  Tara Unger is a 59 y.o. female  Recently diagnosed with bronchiectasis     With regards to osteoporosis:   Diagnosed with osteoporosis based on bone density test.  Osteoporosis medication history:   None  Clinical risk factors: postmenopausal status, family history, h/o prednisone use and low body weight  Since her last visit with me, she reports     Her evaluation has included:      Calcium supplements?  Two TUMS a day  Consumes dairy products regularly? Yes  Eats fresh green vegetables regularly? Yes    Vit D3 intake?  2000 IU per day     Lab Results   Component Value Date    [id number] 59 01/31/2025       Weight bearing exercise?   Yes, strength and weight bearing activities; PNA this year which did affect her exercise regimen  Sense of balance? good  Recent falls? no  Patient is using the following assist devices for ambulation: none  Height loss (>2 inches)? no    Fracture history: denies     Tobacco use?  no  EtOH use?   no     Current diarrhea or h/o malabsorption? no  GERD or stomach ulcers? no  Thyroid disease? No  Has thyroid nodules, followed with ENT  Anemia? no  Kidney Stones? no  Hyperparathyroidism? no    High risk medications include:  Prednisone taper mg x   day(s) - for PNA in 2023 and in 2024     Post-menopausal? Age?: [facility] for a few years, however underwent hysterectomy 8 years ago  ERT after menopause?: no    Mom or dad with hip fracture?: yes, mother with three fractures -> hip/pelvis    Family history of osteoporosis?: mother      Malignancy involving bone (active or history)? no  Prior radiation treatment? no  Unexplained elevation of alkaline phosphatase? no    Dental work planned? no    Lab Results   Component Value Date    PTH 54.3 01/31/2025    [id number] 59 01/31/2025    [id number] 63 05/24/2023    CALCIUM 9.6 01/31/2025    CALCIUM 9.5  "01/06/2025    CALCIUM 9.6 11/29/2024    PHOS 3.5 01/31/2025    PHOS 2.1 (L) 12/10/2014    ALKPHOS 69 03/01/2023    TSH 0.780 01/31/2025    TTGIGA <0.2 01/31/2025         Lab Results   Component Value Date    MANUELPTWILBUR 268 01/31/2025       FINDINGS:  Lumbar spine (L1-L4):               T-score is -3.8, and Z-score is -2.3.   Femoral neck:                          T-score is -2.0, and Z-score is -0.7.   Total hip:                                T-score is -2.4, and Z-score is -1.4.      Fracture Risk (FRAX)   15% risk of a major osteoporotic fracture in the next 10 years.   1.1% risk of hip fracture in the next 10 years.    Review of patient's allergies indicates:  No Known Allergies      Current Outpatient Medications:     albuterol (VENTOLIN HFA) 90 mcg/actuation inhaler, Inhale 2 puffs into the lungs every 4 (four) hours as needed for Wheezing or Shortness of Breath. Rescue, Disp: 18 g, Rfl: 0    estradioL (DIVIGEL) 0.25 mg/0.25 gram (0.1 %) GlPk, Place 1 packet onto the skin once daily., Disp: 30 packet, Rfl: 11    fluticasone propionate (FLONASE) 50 mcg/actuation nasal spray, 2 sprays (100 mcg total) by Each Nostril route once daily. (Patient taking differently: 2 sprays by Each Nostril route daily as needed.), Disp: 15 g, Rfl: 0    mirabegron (MYRBETRIQ) 50 mg Tb24, Take 1 tablet (50 mg total) by mouth once daily., Disp: 30 tablet, Rfl: 11    omega-3 fatty acids 1,000 mg Cap, Take 2 g by mouth., Disp: , Rfl:     prasterone, dhea, (INTRAROSA) 6.5 mg Inst, INSERT 1 TABLET VAGINALLY ONCE DAILY, Disp: 28 each, Rfl: 11    UNABLE TO FIND, medication name: Calm, Disp: , Rfl:     URO--10-40.8-36 mg Cap, TAKE 1 CAPSULE BY MOUTH FOUR TIMES DAILY AS NEEDED, Disp: 120 capsule, Rfl: 3    alcohol swabs (ALCOHOL PREP) PadM, Apply 1 each topically once daily. To use with Forteo injections, Disp: 100 each, Rfl: 3    BD ULTRA-FINE SANDOR PEN NEEDLE 32 gauge x 5/32" Ndle, For Forteo - inject daily, Disp: 100 each, Rfl: 3    " gabapentin (NEURONTIN) 100 MG capsule, Take 1 capsule (100 mg total) by mouth 3 (three) times daily., Disp: 90 capsule, Rfl: 11    inhalation spacing device, Use as directed for inhalation., Disp: 1 each, Rfl: 0    mupirocin (BACTROBAN) 2 % ointment, Apply topically 3 (three) times daily. (Patient not taking: Reported on 2/11/2025), Disp: 30 g, Rfl: 0    promethazine-dextromethorphan (PROMETHAZINE-DM) 6.25-15 mg/5 mL Syrp, Take 5 mLs by mouth every 4 (four) hours as needed (for cough). (Patient not taking: Reported on 2/11/2025), Disp: 118 mL, Rfl: 0    teriparatide (FORTEO) 20 mcg/dose (600mcg/2.4mL) PnIj, Inject 0.08 mLs (20 mcg total) into the skin once daily., Disp: 2.4 mL, Rfl: 11    UNABLE TO FIND, BENZOCAINE/CHLORBUTANOL/GUAIACOL OINTMENT (Patient not taking: Reported on 8/5/2024), Disp: , Rfl:       ROS: see HPI       PHYSICAL EXAM  LMP 11/30/2014   Wt Readings from Last 3 Encounters:   11/05/24 49.9 kg (110 lb)   10/22/24 50.9 kg (112 lb 3.4 oz)   08/05/24 49.4 kg (109 lb)       IMAGING STUDIES    ASSESSMENT/PLAN    Problem List Items Addressed This Visit          1 - High    Age-related osteoporosis without current pathological fracture - Primary     Risk factors: postmenopausal status, , family history and low body weight.  No previous fragility fractures   FRAX not elevated   Low Z scores, awaiting 24 hr urine collection ca and creatinine     Due for DXA scan in 2026, discussed importance of scheduling in the same location to allow for comparison    Secondary evaluation: see HPI   Otherwise normal      Continue calcium and vitamin D  Continue to stay active, exercise and focus on strength and core  Would like to start anabolic (will send to [facility] but patient wants to start after march 6th (son's wedding)    Treatment options and potential side effects discussed for PO bisphosphonates, Reclast, Prolia.     Discussed risk of osteonecrosis of the jaw and AFF with bisphosphonates and denosumab.  "Despite low risk the significant benefit on fracture reduction far outweighs the potential for this rare event.           Relevant Medications    teriparatide (FORTEO) 20 mcg/dose (600mcg/2.4mL) PnIj    BD ULTRA-FINE SANDOR PEN NEEDLE 32 gauge x 5/32" Ndle    alcohol swabs (ALCOHOL PREP) PadM    Other Relevant Orders    Calcium, Timed Urine [facility]; 24 Hours    Creatinine, urine, timed 24 Hours           Physical exam was not performed and vitals were not obtained due to this being a telemedicine (virtual) visit.    The patient location is: home/[place]  The chief complaint leading to consultation is:   Visit type: Virtual visit with synchronous audio and video  Total time spent with patient: 22 min    RTC in 12 months      Jacquelyn Ahuja MD    "

## 2025-02-11 NOTE — PATIENT INSTRUCTIONS
I would recommend starting a medicine called Forteo, which is given as a subcutaneous injection once per day. The medication will help to rebuild your bone density and hopefully prevent further fractures. It's given for a total of two years, followed by an alternative medication to consolidate the gains from the Forteo.   Side effects are usually very mild. The most common thing is light headedness that can occur within an hour of taking the medication. Usually this is mild and can be remedied by taking the medication at night time before bed. In rat studies using much higher doses than what we use clinically, the medication was linked to a rare bone cancer called osteosarcoma. There are observational studies looking for this type of cancer in patients who have used forteo, and to date, none have been reported. This is an ongoing study. But to be cautious we do not use in patients who have other risk factors for bone cancer such as direct radiation to the bone.     Please let me know what questions/concerns you have and if you are in agreement to starting the medication.    [email or web address]      Please don't hesitate to contact me if you have any questions or concerns.

## 2025-02-11 NOTE — ASSESSMENT & PLAN NOTE
Risk factors: postmenopausal status, , family history and low body weight.  No previous fragility fractures   FRAX not elevated   Low Z scores, awaiting 24 hr urine collection ca and creatinine     Due for DXA scan in 2026, discussed importance of scheduling in the same location to allow for comparison    Secondary evaluation: see HPI   Otherwise normal      Continue calcium and vitamin D  Continue to stay active, exercise and focus on strength and core  Would like to start anabolic (will send to [facility] but patient wants to start after march 6th (son's wedding)    Treatment options and potential side effects discussed for PO bisphosphonates, Reclast, Prolia.     Discussed risk of osteonecrosis of the jaw and AFF with bisphosphonates and denosumab. Despite low risk the significant benefit on fracture reduction far outweighs the potential for this rare event.

## 2025-02-25 DIAGNOSIS — M81.0 AGE-RELATED OSTEOPOROSIS WITHOUT CURRENT PATHOLOGICAL FRACTURE: Primary | ICD-10-CM

## 2025-02-25 RX ORDER — TERIPARATIDE 250 UG/ML
20 INJECTION, SOLUTION SUBCUTANEOUS DAILY
Qty: 2.48 ML | Refills: 11 | Status: ACTIVE | OUTPATIENT
Start: 2025-02-25

## 2025-02-27 ENCOUNTER — PATIENT MESSAGE (OUTPATIENT)
Dept: UROGYNECOLOGY | Facility: CLINIC | Age: 60
End: 2025-02-27
Payer: COMMERCIAL

## 2025-02-28 ENCOUNTER — CLINICAL SUPPORT (OUTPATIENT)
Dept: UROGYNECOLOGY | Facility: CLINIC | Age: 60
End: 2025-02-28
Payer: COMMERCIAL

## 2025-02-28 VITALS
DIASTOLIC BLOOD PRESSURE: 68 MMHG | HEIGHT: 66 IN | HEART RATE: 80 BPM | SYSTOLIC BLOOD PRESSURE: 103 MMHG | BODY MASS INDEX: 18.7 KG/M2 | WEIGHT: 116.38 LBS

## 2025-02-28 DIAGNOSIS — R39.15 URINARY URGENCY: ICD-10-CM

## 2025-02-28 DIAGNOSIS — N30.10 IC (INTERSTITIAL CYSTITIS): Primary | ICD-10-CM

## 2025-02-28 DIAGNOSIS — R39.89 BLADDER PAIN: ICD-10-CM

## 2025-02-28 LAB
BILIRUB SERPL-MCNC: NORMAL MG/DL
BLOOD URINE, POC: 50
CLARITY, POC UA: CLEAR
COLOR, POC UA: NORMAL
GLUCOSE UR QL STRIP: NORMAL
KETONES UR QL STRIP: NORMAL
LEUKOCYTE ESTERASE URINE, POC: NORMAL
NITRITE, POC UA: NORMAL
PH, POC UA: 5
PROTEIN, POC: NORMAL
SPECIFIC GRAVITY, POC UA: 1.02
UROBILINOGEN, POC UA: NORMAL

## 2025-02-28 PROCEDURE — 99999 PR PBB SHADOW E&M-EST. PATIENT-LVL V: CPT | Mod: PBBFAC,,,

## 2025-02-28 PROCEDURE — 87086 URINE CULTURE/COLONY COUNT: CPT | Performed by: NURSE PRACTITIONER

## 2025-02-28 RX ORDER — TRIAMCINOLONE ACETONIDE 40 MG/ML
40 INJECTION, SUSPENSION INTRA-ARTICULAR; INTRAMUSCULAR
Status: COMPLETED | OUTPATIENT
Start: 2025-02-28 | End: 2025-02-28

## 2025-02-28 RX ORDER — HEPARIN SODIUM 5000 [USP'U]/ML
10000 INJECTION, SOLUTION INTRAVENOUS; SUBCUTANEOUS
Status: COMPLETED | OUTPATIENT
Start: 2025-02-28 | End: 2025-02-28

## 2025-02-28 RX ORDER — BUPIVACAINE HYDROCHLORIDE 5 MG/ML
20 INJECTION, SOLUTION EPIDURAL; INTRACAUDAL
Status: COMPLETED | OUTPATIENT
Start: 2025-02-28 | End: 2025-02-28

## 2025-02-28 RX ORDER — SODIUM BICARBONATE 42 MG/ML
2.5 INJECTION, SOLUTION INTRAVENOUS ONCE
Status: COMPLETED | OUTPATIENT
Start: 2025-02-28 | End: 2025-02-28

## 2025-02-28 RX ADMIN — SODIUM BICARBONATE 5 ML: 42 INJECTION, SOLUTION INTRAVENOUS at 09:02

## 2025-02-28 RX ADMIN — TRIAMCINOLONE ACETONIDE 40 MG: 40 INJECTION, SUSPENSION INTRA-ARTICULAR; INTRAMUSCULAR at 09:02

## 2025-02-28 RX ADMIN — HEPARIN SODIUM 10000 UNITS: 5000 INJECTION, SOLUTION INTRAVENOUS; SUBCUTANEOUS at 09:02

## 2025-02-28 RX ADMIN — BUPIVACAINE HYDROCHLORIDE 100 MG: 5 INJECTION, SOLUTION EPIDURAL; INTRACAUDAL at 09:02

## 2025-02-28 NOTE — PROGRESS NOTES
A #14 red rubber cath inserted into bladder using sterile technique. The urethral meatus was prepped with betadine prior to cath insertion.  10 cc clear blue/green urine drained from bladder.  A solution of  5% marcaine 20 mL, heparin 10,000 IU/ml 2 mL, kenalog 40 mg, and sodium bicarb 4.2% (2.5 mEq) was instilled into bladder without difficulty. Procedure was tolerated very well. She was instructed to hold urine for one hour before voiding.

## 2025-03-01 LAB — BACTERIA UR CULT: NO GROWTH

## 2025-03-03 ENCOUNTER — RESULTS FOLLOW-UP (OUTPATIENT)
Dept: UROGYNECOLOGY | Facility: CLINIC | Age: 60
End: 2025-03-03

## 2025-03-24 ENCOUNTER — PATIENT MESSAGE (OUTPATIENT)
Dept: OBSTETRICS AND GYNECOLOGY | Facility: CLINIC | Age: 60
End: 2025-03-24
Payer: COMMERCIAL

## 2025-03-24 ENCOUNTER — PATIENT MESSAGE (OUTPATIENT)
Dept: ENDOCRINOLOGY | Facility: CLINIC | Age: 60
End: 2025-03-24
Payer: COMMERCIAL

## 2025-03-24 DIAGNOSIS — N95.2 VAGINAL ATROPHY: ICD-10-CM

## 2025-03-24 PROCEDURE — 82570 ASSAY OF URINE CREATININE: CPT | Performed by: INTERNAL MEDICINE

## 2025-03-24 PROCEDURE — 82340 ASSAY OF CALCIUM IN URINE: CPT | Performed by: INTERNAL MEDICINE

## 2025-03-24 RX ORDER — PRASTERONE 6.5 MG/1
1 INSERT VAGINAL NIGHTLY
Qty: 28 EACH | Refills: 11 | Status: SHIPPED | OUTPATIENT
Start: 2025-03-24 | End: 2026-03-24

## 2025-03-24 RX ORDER — PRASTERONE 6.5 MG/1
6.5 INSERT VAGINAL DAILY
Qty: 28 EACH | Refills: 3 | Status: SHIPPED | OUTPATIENT
Start: 2025-03-24

## 2025-03-24 NOTE — TELEPHONE ENCOUNTER
Spoke to patient.  Scheduled appointment on 5/30/2025 at 9:00 am at [facility].  Patient agreed to date, time and place.  Call ended.

## 2025-04-11 ENCOUNTER — CLINICAL SUPPORT (OUTPATIENT)
Dept: UROGYNECOLOGY | Facility: CLINIC | Age: 60
End: 2025-04-11
Payer: COMMERCIAL

## 2025-04-11 ENCOUNTER — PATIENT MESSAGE (OUTPATIENT)
Dept: UROGYNECOLOGY | Facility: CLINIC | Age: 60
End: 2025-04-11

## 2025-04-11 VITALS
WEIGHT: 115.94 LBS | HEIGHT: 66 IN | DIASTOLIC BLOOD PRESSURE: 75 MMHG | HEART RATE: 68 BPM | BODY MASS INDEX: 18.63 KG/M2 | SYSTOLIC BLOOD PRESSURE: 115 MMHG

## 2025-04-11 DIAGNOSIS — N30.10 IC (INTERSTITIAL CYSTITIS): Primary | ICD-10-CM

## 2025-04-11 PROCEDURE — 99999 PR PBB SHADOW E&M-EST. PATIENT-LVL III: CPT | Mod: PBBFAC,,,

## 2025-04-11 RX ORDER — HEPARIN SODIUM 5000 [USP'U]/ML
10000 INJECTION, SOLUTION INTRAVENOUS; SUBCUTANEOUS
Status: COMPLETED | OUTPATIENT
Start: 2025-04-11 | End: 2025-04-11

## 2025-04-11 RX ORDER — BUPIVACAINE HYDROCHLORIDE 5 MG/ML
20 INJECTION, SOLUTION EPIDURAL; INTRACAUDAL; PERINEURAL
Status: COMPLETED | OUTPATIENT
Start: 2025-04-11 | End: 2025-04-11

## 2025-04-11 RX ORDER — TRIAMCINOLONE ACETONIDE 40 MG/ML
40 INJECTION, SUSPENSION INTRA-ARTICULAR; INTRAMUSCULAR
Status: COMPLETED | OUTPATIENT
Start: 2025-04-11 | End: 2025-04-11

## 2025-04-11 RX ORDER — SODIUM BICARBONATE 42 MG/ML
2.5 INJECTION, SOLUTION INTRAVENOUS ONCE
Status: COMPLETED | OUTPATIENT
Start: 2025-04-11 | End: 2025-04-11

## 2025-04-11 RX ADMIN — HEPARIN SODIUM 10000 UNITS: 5000 INJECTION, SOLUTION INTRAVENOUS; SUBCUTANEOUS at 10:04

## 2025-04-11 RX ADMIN — BUPIVACAINE HYDROCHLORIDE 100 MG: 5 INJECTION, SOLUTION EPIDURAL; INTRACAUDAL; PERINEURAL at 10:04

## 2025-04-11 RX ADMIN — TRIAMCINOLONE ACETONIDE 40 MG: 40 INJECTION, SUSPENSION INTRA-ARTICULAR; INTRAMUSCULAR at 10:04

## 2025-04-11 RX ADMIN — SODIUM BICARBONATE 2.5 MEQ: 42 INJECTION, SOLUTION INTRAVENOUS at 10:04

## 2025-04-11 NOTE — PROGRESS NOTES
A #14 red rubber cath inserted into bladder using sterile technique. The urethral meatus was prepped with betadine prior to cath insertion.  40 cc clear blue/green urine drained from bladder.  A solution of  5% marcaine 20 mL, heparin 10,000 IU/ml 2mL, kenalog 40 mg, and sodium bicarb 4.2% (2.5 mEq) was instilled into bladder without difficulty. Procedure was tolerated very well. She was instructed to hold urine for one hour before voiding.

## 2025-04-17 ENCOUNTER — CLINICAL SUPPORT (OUTPATIENT)
Dept: UROGYNECOLOGY | Facility: CLINIC | Age: 60
End: 2025-04-17
Payer: COMMERCIAL

## 2025-04-17 VITALS
HEART RATE: 80 BPM | SYSTOLIC BLOOD PRESSURE: 117 MMHG | WEIGHT: 115.75 LBS | HEIGHT: 66 IN | DIASTOLIC BLOOD PRESSURE: 73 MMHG | BODY MASS INDEX: 18.6 KG/M2

## 2025-04-17 DIAGNOSIS — N30.10 IC (INTERSTITIAL CYSTITIS): Primary | ICD-10-CM

## 2025-04-17 PROCEDURE — 99999 PR PBB SHADOW E&M-EST. PATIENT-LVL IV: CPT | Mod: PBBFAC,,,

## 2025-04-17 RX ORDER — TRIAMCINOLONE ACETONIDE 40 MG/ML
40 INJECTION, SUSPENSION INTRA-ARTICULAR; INTRAMUSCULAR
Status: COMPLETED | OUTPATIENT
Start: 2025-04-17 | End: 2025-04-17

## 2025-04-17 RX ORDER — SODIUM BICARBONATE 42 MG/ML
2.5 INJECTION, SOLUTION INTRAVENOUS ONCE
Status: COMPLETED | OUTPATIENT
Start: 2025-04-17 | End: 2025-04-17

## 2025-04-17 RX ORDER — HEPARIN SODIUM 5000 [USP'U]/ML
10000 INJECTION, SOLUTION INTRAVENOUS; SUBCUTANEOUS
Status: COMPLETED | OUTPATIENT
Start: 2025-04-17 | End: 2025-04-17

## 2025-04-17 RX ORDER — BUPIVACAINE HYDROCHLORIDE 5 MG/ML
20 INJECTION, SOLUTION EPIDURAL; INTRACAUDAL; PERINEURAL
Status: COMPLETED | OUTPATIENT
Start: 2025-04-17 | End: 2025-04-17

## 2025-04-17 RX ADMIN — BUPIVACAINE HYDROCHLORIDE 100 MG: 5 INJECTION, SOLUTION EPIDURAL; INTRACAUDAL; PERINEURAL at 11:04

## 2025-04-17 RX ADMIN — HEPARIN SODIUM 10000 UNITS: 5000 INJECTION, SOLUTION INTRAVENOUS; SUBCUTANEOUS at 11:04

## 2025-04-17 RX ADMIN — SODIUM BICARBONATE 2.5 MEQ: 42 INJECTION, SOLUTION INTRAVENOUS at 11:04

## 2025-04-17 RX ADMIN — TRIAMCINOLONE ACETONIDE 40 MG: 40 INJECTION, SUSPENSION INTRA-ARTICULAR; INTRAMUSCULAR at 11:04

## 2025-04-17 NOTE — PROGRESS NOTES
A #14 red rubber cath inserted into bladder using sterile technique. The urethral meatus was prepped with betadine prior to cath insertion.  30 cc clear green/blue urine drained from bladder.  A solution of  5% marcaine 20 mL, heparin 10,000 IU/ml 2 mL, kenalog 40 mg, and sodium bicarb 4.2% (2.5 mEq) was instilled into bladder without difficulty. Procedure was tolerated very well. She was instructed to hold urine for one hour before voiding.;

## 2025-04-25 ENCOUNTER — CLINICAL SUPPORT (OUTPATIENT)
Dept: UROGYNECOLOGY | Facility: CLINIC | Age: 60
End: 2025-04-25
Payer: COMMERCIAL

## 2025-04-25 VITALS
SYSTOLIC BLOOD PRESSURE: 98 MMHG | HEART RATE: 69 BPM | DIASTOLIC BLOOD PRESSURE: 69 MMHG | WEIGHT: 116.38 LBS | BODY MASS INDEX: 18.7 KG/M2 | HEIGHT: 66 IN

## 2025-04-25 DIAGNOSIS — N30.10 IC (INTERSTITIAL CYSTITIS): Primary | ICD-10-CM

## 2025-04-25 PROCEDURE — 99999 PR PBB SHADOW E&M-EST. PATIENT-LVL IV: CPT | Mod: PBBFAC,,,

## 2025-04-25 RX ORDER — TRIAMCINOLONE ACETONIDE 40 MG/ML
40 INJECTION, SUSPENSION INTRA-ARTICULAR; INTRAMUSCULAR
Status: COMPLETED | OUTPATIENT
Start: 2025-04-25 | End: 2025-04-25

## 2025-04-25 RX ORDER — SODIUM BICARBONATE 42 MG/ML
2.5 INJECTION, SOLUTION INTRAVENOUS ONCE
Status: COMPLETED | OUTPATIENT
Start: 2025-04-25 | End: 2025-04-25

## 2025-04-25 RX ORDER — HEPARIN SODIUM 5000 [USP'U]/ML
10000 INJECTION, SOLUTION INTRAVENOUS; SUBCUTANEOUS
Status: COMPLETED | OUTPATIENT
Start: 2025-04-25 | End: 2025-04-25

## 2025-04-25 RX ORDER — BUPIVACAINE HYDROCHLORIDE 5 MG/ML
20 INJECTION, SOLUTION EPIDURAL; INTRACAUDAL; PERINEURAL
Status: COMPLETED | OUTPATIENT
Start: 2025-04-25 | End: 2025-04-25

## 2025-04-25 RX ADMIN — TRIAMCINOLONE ACETONIDE 40 MG: 40 INJECTION, SUSPENSION INTRA-ARTICULAR; INTRAMUSCULAR at 09:04

## 2025-04-25 RX ADMIN — HEPARIN SODIUM 10000 UNITS: 5000 INJECTION, SOLUTION INTRAVENOUS; SUBCUTANEOUS at 09:04

## 2025-04-25 RX ADMIN — SODIUM BICARBONATE 2.5 MEQ: 42 INJECTION, SOLUTION INTRAVENOUS at 09:04

## 2025-04-25 RX ADMIN — BUPIVACAINE HYDROCHLORIDE 100 MG: 5 INJECTION, SOLUTION EPIDURAL; INTRACAUDAL; PERINEURAL at 09:04

## 2025-04-25 NOTE — PROGRESS NOTES
A #14 red rubber cath inserted into bladder using sterile technique. The urethral meatus was prepped with betadine prior to cath insertion.  2 cc clear yellow urine drained from bladder.  A solution of  5% marcaine 20 mL, heparin 10,000 IU/ml 2 mL, kenalog 40 mg, and sodium bicarb 4.2% (2.5 mEq) was instilled into bladder without difficulty. Procedure was tolerated very well. She was instructed to hold urine for one hour before voiding.

## 2025-04-30 ENCOUNTER — PATIENT MESSAGE (OUTPATIENT)
Dept: UROGYNECOLOGY | Facility: CLINIC | Age: 60
End: 2025-04-30
Payer: COMMERCIAL

## 2025-04-30 RX ORDER — METHENAMINE, SODIUM PHOSPHATE, MONOBASIC, ANHYDROUS, PHENYL SALICYLATE, METHYLENE BLUE AND HYOSCYAMINE SULFATE 118; 40.8; 36; 10; .12 MG/1; MG/1; MG/1; MG/1; MG/1
1 CAPSULE ORAL 4 TIMES DAILY PRN
Qty: 120 CAPSULE | Refills: 0 | Status: SHIPPED | OUTPATIENT
Start: 2025-04-30 | End: 2025-05-30

## 2025-05-02 ENCOUNTER — CLINICAL SUPPORT (OUTPATIENT)
Dept: UROGYNECOLOGY | Facility: CLINIC | Age: 60
End: 2025-05-02
Payer: COMMERCIAL

## 2025-05-02 VITALS
HEIGHT: 66 IN | SYSTOLIC BLOOD PRESSURE: 108 MMHG | HEART RATE: 71 BPM | BODY MASS INDEX: 18.53 KG/M2 | WEIGHT: 115.31 LBS | DIASTOLIC BLOOD PRESSURE: 66 MMHG

## 2025-05-02 DIAGNOSIS — N30.10 IC (INTERSTITIAL CYSTITIS): Primary | ICD-10-CM

## 2025-05-02 PROCEDURE — 99999 PR PBB SHADOW E&M-EST. PATIENT-LVL IV: CPT | Mod: PBBFAC,,,

## 2025-05-02 PROCEDURE — 87086 URINE CULTURE/COLONY COUNT: CPT

## 2025-05-02 RX ORDER — HEPARIN SODIUM 5000 [USP'U]/ML
10000 INJECTION, SOLUTION INTRAVENOUS; SUBCUTANEOUS
Status: COMPLETED | OUTPATIENT
Start: 2025-05-02 | End: 2025-05-02

## 2025-05-02 RX ORDER — TRIAMCINOLONE ACETONIDE 40 MG/ML
40 INJECTION, SUSPENSION INTRA-ARTICULAR; INTRAMUSCULAR
Status: COMPLETED | OUTPATIENT
Start: 2025-05-02 | End: 2025-05-02

## 2025-05-02 RX ORDER — SODIUM BICARBONATE 42 MG/ML
2.5 INJECTION, SOLUTION INTRAVENOUS ONCE
Status: COMPLETED | OUTPATIENT
Start: 2025-05-02 | End: 2025-05-02

## 2025-05-02 RX ORDER — BUPIVACAINE HYDROCHLORIDE 5 MG/ML
20 INJECTION, SOLUTION EPIDURAL; INTRACAUDAL; PERINEURAL
Status: COMPLETED | OUTPATIENT
Start: 2025-05-02 | End: 2025-05-02

## 2025-05-02 RX ADMIN — TRIAMCINOLONE ACETONIDE 40 MG: 40 INJECTION, SUSPENSION INTRA-ARTICULAR; INTRAMUSCULAR at 09:05

## 2025-05-02 RX ADMIN — HEPARIN SODIUM 10000 UNITS: 5000 INJECTION, SOLUTION INTRAVENOUS; SUBCUTANEOUS at 09:05

## 2025-05-02 RX ADMIN — SODIUM BICARBONATE 2.5 MEQ: 42 INJECTION, SOLUTION INTRAVENOUS at 09:05

## 2025-05-02 RX ADMIN — BUPIVACAINE HYDROCHLORIDE 100 MG: 5 INJECTION, SOLUTION EPIDURAL; INTRACAUDAL; PERINEURAL at 09:05

## 2025-05-02 NOTE — PROGRESS NOTES
A #14 red rubber cath inserted into bladder using sterile technique. The urethral meatus was prepped with betadine prior to cath insertion.  30 cc clear blue/green urine drained from bladder.  A solution of  5% marcaine 20 mL, heparin 10,000 IU/ml 2 mL, kenalog 40 mg, and sodium bicarb 4.2% (2.5 mEq) was instilled into bladder without difficulty. Procedure was tolerated very well. She was instructed to hold urine for one hour before voiding.

## 2025-05-06 RX ORDER — METHENAMINE, SODIUM PHOSPHATE, MONOBASIC, ANHYDROUS, PHENYL SALICYLATE, METHYLENE BLUE AND HYOSCYAMINE SULFATE 118; 40.8; 36; 10; .12 MG/1; MG/1; MG/1; MG/1; MG/1
1 CAPSULE ORAL 4 TIMES DAILY PRN
Qty: 120 CAPSULE | Refills: 0 | Status: SHIPPED | OUTPATIENT
Start: 2025-05-06 | End: 2025-06-05

## 2025-05-07 ENCOUNTER — RESULTS FOLLOW-UP (OUTPATIENT)
Dept: UROGYNECOLOGY | Facility: CLINIC | Age: 60
End: 2025-05-07

## 2025-05-23 ENCOUNTER — PATIENT MESSAGE (OUTPATIENT)
Dept: UROGYNECOLOGY | Facility: CLINIC | Age: 60
End: 2025-05-23
Payer: COMMERCIAL

## 2025-05-30 ENCOUNTER — OFFICE VISIT (OUTPATIENT)
Dept: UROGYNECOLOGY | Facility: CLINIC | Age: 60
End: 2025-05-30
Payer: COMMERCIAL

## 2025-05-30 VITALS
SYSTOLIC BLOOD PRESSURE: 125 MMHG | HEART RATE: 72 BPM | BODY MASS INDEX: 18.61 KG/M2 | DIASTOLIC BLOOD PRESSURE: 74 MMHG | HEIGHT: 66 IN

## 2025-05-30 DIAGNOSIS — N30.10 IC (INTERSTITIAL CYSTITIS): Primary | ICD-10-CM

## 2025-05-30 DIAGNOSIS — M62.89 PELVIC FLOOR TENSION: ICD-10-CM

## 2025-05-30 DIAGNOSIS — N95.2 VAGINAL ATROPHY: ICD-10-CM

## 2025-05-30 DIAGNOSIS — N32.81 OAB (OVERACTIVE BLADDER): ICD-10-CM

## 2025-05-30 LAB
BILIRUB SERPL-MCNC: NORMAL MG/DL
BLOOD URINE, POC: NORMAL
CLARITY, POC UA: CLEAR
COLOR, POC UA: NORMAL
GLUCOSE UR QL STRIP: NORMAL
KETONES UR QL STRIP: NORMAL
LEUKOCYTE ESTERASE URINE, POC: NORMAL
NITRITE, POC UA: NORMAL
PH, POC UA: 5
PROTEIN, POC: NORMAL
SPECIFIC GRAVITY, POC UA: 1.02
UROBILINOGEN, POC UA: NORMAL

## 2025-05-30 PROCEDURE — 99999 PR PBB SHADOW E&M-EST. PATIENT-LVL IV: CPT | Mod: PBBFAC,,, | Performed by: NURSE PRACTITIONER

## 2025-05-30 RX ORDER — GABAPENTIN 100 MG/1
100 CAPSULE ORAL NIGHTLY
Qty: 30 CAPSULE | Refills: 11 | Status: SHIPPED | OUTPATIENT
Start: 2025-05-30 | End: 2026-05-30

## 2025-05-30 RX ORDER — VIBEGRON 75 MG/1
75 TABLET, FILM COATED ORAL DAILY
Qty: 30 TABLET | Refills: 11 | Status: SHIPPED | OUTPATIENT
Start: 2025-05-30

## 2025-05-30 NOTE — PATIENT INSTRUCTIONS
1)  IC  --Empty bladder every 3 hours.  Empty well: wait a minute, lean forward on toilet.    --Avoid dietary irritants (see sheet).  Keep diary x 3-5 days to determine your irritants.    --URGE: oxybutynin, trospium caused constipation.   Uses uribel intermittently.  Use uribel post coital and any time you are having urgency.  --Recommend Prelief to help alkinize the urine:              --Prelief Tablets : Each tablet contains 345 mg calcium glycerophosphate (65 mg of elemental calcium). The tablets also contain 0.25% magnesium            stearate as a processing aid. Two tablets are equivalent to 690 mg calcium glycerophosphate (130mg of elemental calcium).              --Prelief Powder : Each ¼ teaspoon usage of powder is comparable to two tablets. The powder dissolves rapidly in food or non-alcoholic beverages.             Tablets are recommended for taking with alcoholic beverages              --Usage:                           --Tablets: 2-3 tablets, 2 times a day.                          --Powder: ¼ teaspoon of powder 2 times a day. More can be used when needed  --Hydrodistention With Cystoscopy: allows physicians to take a much closer look at the bladder wall. While the AUA no longer suggests that it be used as a diagnostic method (unless a diagnosis is in doubt), hydrodistention may provide modest relief in IC symptoms which can last from three to six months.   --continue  align  --trial gemtesa 75 mg --let me know if it is not affordable  --restart gabapentin 100 mg nightly        2)Vaginal atrophy (dryness):   --continue intrarosa daily          3)Constipation:  --Take 1 fiber pill/day x 3 days.  Then 2 pills/day x 3 days.  Then 3 pills/day x 3 days...increasing in this fashion to max 6 pills a day.  STOP when you find dose that makes stool easy to pass (this may be 1 pill a day or may be 4 pills/day).  Continue at this dose FOREVER.  Additionally, take 1-2 stool softener pills (EX: colace) OTC daily.   AVOID laxatives.    --continue  calm (can titrate)  --consider magnesium oxide 400 mg     4)pelvic floor tension  --continue pelvic floor PT home exercise program       6)send me a message in 6 weeks and let me know how you are doing. If no improvement, can consider a cystohydrodistension

## 2025-05-30 NOTE — PROGRESS NOTES
Urogyn follow up  05/30/2025  .  [facility] - UROGYNECOLOGY  [address] [address]    Tara Unger  [id number]  1965      Tara Unger is a  here for a urogyn follow up for IC flare s/p bladder instillation x 4.     12/8/14  Title of Operation:  1) Total laparoscopic hysterectomy  2) Bilateral laparoscopic salpingectomy  3) Bilateral laparoscopic uterosacral suspension  4) Cystourethroscopy  5) Posterior repair with perineorrhaphy  6) Intraoperative radiology films for incorrect count: positive for needle in pelvis, removed laparoscopically, repeat film negative and count correct.      Indications for Surgery:  1) UI: (+) UVALDO only if bladder really full and sneezes, not with exercise. UVALDO not daily. (--) pads, no underwear changes. Daytime frequency: Q 2 hours. Nocturia: Yes: 1/night, around 4 AM. (--) dysuria, (--) hematuria, (--) frequent UTIs. Has had 1-2 mild UTIs. (+) complete bladder emptying.  --UF prolonged but normal otherwise. PF incomplete void until catheters removed. Compliance normal. Max capacity >400 mL. DO (--). UVALDO (--).   2) POP: Absent. (--) vaginal bleeding. +nl menses on OCP. (--) vaginal discharge. (+) sexually active. (--) dyspareunia. (+) Vaginal dryness. (--) vaginal estrogen use.   --POP-Q: Aa -1; Ba -1; C -6; Ap 0; Bp 0 (very distal rectocele); D -9. Genital hiatus 3, perineal body 3, total vaginal length 10.  3) BM: (+) constipation/straining. (--) chronic diarrhea. (--) hematochezia. (--) fecal incontinence. (--) fecal smearing/urgency. (+) incomplete evacuation. No splinting.   4) ? IC: Dx last year per Dr. Labadie at [facility]. No cysto. Was counseled to eliminate exacerbating foods--did seem to help. Seems worse after on feet all day or with stress. Sx: frequency, urgency, feels some burning at the end of void, difficult evacuation. +relief with emptying. Still + menses--on OCP due to frequent menses. No correlation with menses. May have some flare  with intercourse. Pain with flare: 6/10 only with urination, usually lasts x several weeks but gradually improves. Flares occur Q 2-3 months. Does have some anxiety. No previous treatment.   5) Patient desires ovarian preservation.      Preoperative Diagnosis:  1) Cystocele  2) Rectocele  3) Dysfunctional uterine bleeding  4) Urinary urgency/frequency  5) Concern for interstitial cystitis  6) Chronic constipation  7) Defecatory dysfunction  8) Mixed urinary incontinence     Postoperative Diagnosis:  1) Cystocele  2) Rectocele  3) Dysfunctional uterine bleeding  4) Urinary urgency/frequency  5) Concern for interstitial cystitis  6) Chronic constipation  7) Defecatory dysfunction  8) Mixed urinary incontinence      12/22/2023  Complains of dysuria and bladder pain.    Has been been going to pelvic floor PT  Myrbetriq was $500---did not fill  Uribel has not been helping burning, taking 3-4 times daily  Using intrarosa  Vaginal valium/lidocaine/baclofen has not been helping    01/25/2024  Started myrbetriq 6 days ago.   Urethral burning is improving.   Using intrarosa  Going to pelvic floor PT  Voiding every 2-3 hours during the day --nocturia 1/ night improved from 2/ night  Intermittent hard stool-- not straining  Taking calm powder    Changes since last visit:  IC  --describes bladder pain, nocturia, dysuria throughout micturition, urgency  --was worse when she ran out of intrarosa  --uribel 3 times daily (does not relieve the burning)  --myrbetriq 50 mg daily  --has been doing pelvic floor PT with [facility]  --using wand 2-3 times/week  --using estrogen cream near urethra  --ran out of gabapentin in march --- pain has worsened  --voiding 2 hours during the day and 3/ night    Had instillations x 4 in April-- felt as if the pain improved some.     Past Medical History:   Diagnosis Date    Osteoporosis     Urinary tract infection     Uterine fibroid        Past Surgical History:   Procedure Laterality Date     HYSTERECTOMY      2014    PELVIC LAPAROSCOPY      uterine septum repair    RHINOPLASTY TIP  1980    TUBAL LIGATION  December 1999    Postpartum    umbilical cyst  2004       Family History   Problem Relation Name Age of Onset    Breast cancer Maternal Cousin Andreea     Ovarian cancer Maternal Cousin Krystin     Colon cancer Neg Hx      Diabetes Neg Hx      Hypertension Neg Hx      Stroke Neg Hx      Cervical cancer Neg Hx      Endometrial cancer Neg Hx      Vaginal cancer Neg Hx         Social History     Socioeconomic History    Marital status:    Tobacco Use    Smoking status: Never    Smokeless tobacco: Never   Substance and Sexual Activity    Alcohol use: No     Comment: rare    Drug use: No    Sexual activity: Yes     Partners: Male     Birth control/protection: Surgical     Social Drivers of Health     Financial Resource Strain: Low Risk  (2/11/2025)    Overall Financial Resource Strain (CARDIA)     Difficulty of Paying Living Expenses: Not hard at all   Food Insecurity: No Food Insecurity (2/11/2025)    Hunger Vital Sign     Worried About Running Out of Food in the Last Year: Never true     Ran Out of Food in the Last Year: Never true   Transportation Needs: No Transportation Needs (2/11/2025)    PRAPARE - Transportation     Lack of Transportation (Medical): No     Lack of Transportation (Non-Medical): No   Physical Activity: Sufficiently Active (2/11/2025)    Exercise Vital Sign     Days of Exercise per Week: 4 days     Minutes of Exercise per Session: 40 min   Stress: No Stress Concern Present (2/11/2025)    [employer] - Occupational Stress Questionnaire     Feeling of Stress : Only a little   Housing Stability: Low Risk  (2/11/2025)    Housing Stability Vital Sign     Unable to Pay for Housing in the Last Year: No     Homeless in the Last Year: No       Current Outpatient Medications   Medication Sig Dispense Refill    albuterol (VENTOLIN HFA) 90 mcg/actuation inhaler  "Inhale 2 puffs into the lungs every 4 (four) hours as needed for Wheezing or Shortness of Breath. Rescue 18 g 0    alcohol swabs (ALCOHOL PREP) PadM Apply 1 each topically once daily. To use with Forteo injections 100 each 3    BD ULTRA-FINE SANDOR PEN NEEDLE 32 gauge x 5/32" Ndle For Forteo - inject daily 100 each 3    estradioL (DIVIGEL) 0.25 mg/0.25 gram (0.1 %) GlPk Place 1 packet onto the skin once daily. 30 packet 11    fluticasone propionate (FLONASE) 50 mcg/actuation nasal spray 2 sprays (100 mcg total) by Each Nostril route once daily. 15 g 0    gabapentin (NEURONTIN) 100 MG capsule Take 1 capsule (100 mg total) by mouth every evening. 30 capsule 11    inhalation spacing device Use as directed for inhalation. 1 each 0    methen-m.blue-s.phos-phsal-hyo (URO-MP) 118-10-40.8-36 mg Cap Take 1 capsule by mouth 4 (four) times daily as needed. 120 capsule 0    mirabegron (MYRBETRIQ) 50 mg Tb24 Take 1 tablet (50 mg total) by mouth once daily. 30 tablet 11    mupirocin (BACTROBAN) 2 % ointment Apply topically 3 (three) times daily. 30 g 0    omega-3 fatty acids 1,000 mg Cap Take 2 g by mouth.      prasterone, dhea, (INTRAROSA) 6.5 mg Inst Place 6.5 mg vaginally once daily. 28 each 3    prasterone, dhea, (INTRAROSA) 6.5 mg Inst Place 1 Insert vaginally every evening. 28 each 11    promethazine-dextromethorphan (PROMETHAZINE-DM) 6.25-15 mg/5 mL Syrp Take 5 mLs by mouth every 4 (four) hours as needed (for cough). 118 mL 0    teriparatide 20 mcg/dose (620mcg/2.48mL) PnIj Inject 20 mcg into the skin once daily. 2.48 mL 11    UNABLE TO FIND medication name: Calm      UNABLE TO FIND BENZOCAINE/CHLORBUTANOL/GUAIACOL OINTMENT      vibegron (GEMTESA) 75 mg Tab Take 1 tablet (75 mg total) by mouth once daily. 30 tablet 11     No current facility-administered medications for this visit.       Review of patient's allergies indicates:  No Known Allergies    Well woman:  Pap:02/2013 normal- patient is post hyst- no further screening " "needed  Mammo:06/2024- normal  Colonoscopy:07/29/2016 normal- repeat in 10 years  Dexa:2014 normal per patient report       ROS:  As per HPI.      Exam  /74 (BP Location: Left arm, Patient Position: Sitting)   Pulse 72   Ht 5' 6" (1.676 m)   LMP 11/30/2014   BMI 18.61 kg/m²   General: alert and oriented, no acute distress  Respiratory: normal respiratory effort  Abd: soft, non-tender, non-distended    Pelvic  Ext. Genitalia: normal external genitalia. Normal bartholin's and skeens glands  Vagina: + atrophy. Normal vaginal mucosa without lesions. No discharge noted.   Non-tender bladder base without palpable mass.  +TTP in bilateral OI and suburethral        Impression  1. IC (interstitial cystitis)  POCT URINE DIPSTICK WITHOUT MICROSCOPE    vibegron (GEMTESA) 75 mg Tab      2. OAB (overactive bladder)  vibegron (GEMTESA) 75 mg Tab      3. Pelvic floor tension  gabapentin (NEURONTIN) 100 MG capsule      4. Vaginal atrophy              We reviewed the above issues and discussed options for short-term versus long-term management of her problems.   Plan:   1)  IC  --Empty bladder every 3 hours.  Empty well: wait a minute, lean forward on toilet.    --Avoid dietary irritants (see sheet).  Keep diary x 3-5 days to determine your irritants.    --URGE: oxybutynin, trospium caused constipation.   Uses uribel intermittently.  Use uribel post coital and any time you are having urgency.  --Recommend Prelief to help alkinize the urine:              --Prelief Tablets : Each tablet contains 345 mg calcium glycerophosphate (65 mg of elemental calcium). The tablets also contain 0.25% magnesium            stearate as a processing aid. Two tablets are equivalent to 690 mg calcium glycerophosphate (130mg of elemental calcium).              --Prelief Powder : Each ¼ teaspoon usage of powder is comparable to two tablets. The powder dissolves rapidly in food or non-alcoholic beverages.             Tablets are recommended for " taking with alcoholic beverages              --Usage:                           --Tablets: 2-3 tablets, 2 times a day.                          --Powder: ¼ teaspoon of powder 2 times a day. More can be used when needed  --Hydrodistention With Cystoscopy: allows physicians to take a much closer look at the bladder wall. While the AUA no longer suggests that it be used as a diagnostic method (unless a diagnosis is in doubt), hydrodistention may provide modest relief in IC symptoms which can last from three to six months.   --continue  align  --trial gemtesa 75 mg --let me know if it is not affordable  --restart gabapentin 100 mg nightly        2)Vaginal atrophy (dryness):   --continue intrarosa daily          3)Constipation:  --Take 1 fiber pill/day x 3 days.  Then 2 pills/day x 3 days.  Then 3 pills/day x 3 days...increasing in this fashion to max 6 pills a day.  STOP when you find dose that makes stool easy to pass (this may be 1 pill a day or may be 4 pills/day).  Continue at this dose FOREVER.  Additionally, take 1-2 stool softener pills (EX: colace) OTC daily.  AVOID laxatives.    --continue  calm (can titrate)  --consider magnesium oxide 400 mg     4)pelvic floor tension  --continue pelvic floor PT home exercise program         6)send me a message in 6 weeks and let me know how you are doing. If no improvement, can consider a cystohydrodistension    I spent a total of 30 minutes on the day of the visit.  This includes face to face time and non-face to face time preparing to see the patient (eg, review of tests), obtaining and/or reviewing separately obtained history, documenting clinical information in the electronic or other health record, independently interpreting results and communicating results to the patient/family/caregiver, or care coordinator.     Leona Broussard, RITESH-BC [facility]  Division of Female Pelvic Medicine and Reconstructive Surgery  Department of Obstetrics & Gynecology

## 2025-06-03 ENCOUNTER — PATIENT MESSAGE (OUTPATIENT)
Dept: UROGYNECOLOGY | Facility: CLINIC | Age: 60
End: 2025-06-03
Payer: COMMERCIAL

## 2025-06-03 DIAGNOSIS — N32.81 OAB (OVERACTIVE BLADDER): ICD-10-CM

## 2025-06-03 DIAGNOSIS — N30.10 IC (INTERSTITIAL CYSTITIS): ICD-10-CM

## 2025-06-03 RX ORDER — VIBEGRON 75 MG/1
75 TABLET, FILM COATED ORAL DAILY
Qty: 30 TABLET | Refills: 11 | Status: SHIPPED | OUTPATIENT
Start: 2025-06-03

## 2025-06-11 ENCOUNTER — PATIENT MESSAGE (OUTPATIENT)
Dept: UROGYNECOLOGY | Facility: CLINIC | Age: 60
End: 2025-06-11
Payer: COMMERCIAL

## 2025-06-18 ENCOUNTER — PATIENT MESSAGE (OUTPATIENT)
Dept: OBSTETRICS AND GYNECOLOGY | Facility: CLINIC | Age: 60
End: 2025-06-18
Payer: COMMERCIAL

## 2025-06-26 ENCOUNTER — LAB VISIT (OUTPATIENT)
Dept: LAB | Facility: HOSPITAL | Age: 60
End: 2025-06-26
Payer: COMMERCIAL

## 2025-06-26 ENCOUNTER — PATIENT MESSAGE (OUTPATIENT)
Dept: UROGYNECOLOGY | Facility: CLINIC | Age: 60
End: 2025-06-26
Payer: COMMERCIAL

## 2025-06-26 DIAGNOSIS — R82.90 URINE ABNORMALITY: ICD-10-CM

## 2025-06-26 DIAGNOSIS — R82.90 URINE ABNORMALITY: Primary | ICD-10-CM

## 2025-06-26 PROCEDURE — 87086 URINE CULTURE/COLONY COUNT: CPT

## 2025-06-27 ENCOUNTER — PATIENT MESSAGE (OUTPATIENT)
Dept: UROGYNECOLOGY | Facility: CLINIC | Age: 60
End: 2025-06-27
Payer: COMMERCIAL

## 2025-06-27 DIAGNOSIS — M62.89 PELVIC FLOOR TENSION: ICD-10-CM

## 2025-06-28 LAB — BACTERIA UR CULT: NORMAL

## 2025-06-30 ENCOUNTER — RESULTS FOLLOW-UP (OUTPATIENT)
Dept: UROGYNECOLOGY | Facility: CLINIC | Age: 60
End: 2025-06-30

## 2025-06-30 RX ORDER — GABAPENTIN 100 MG/1
300 CAPSULE ORAL NIGHTLY
Qty: 90 CAPSULE | Refills: 11 | Status: SHIPPED | OUTPATIENT
Start: 2025-06-30 | End: 2026-06-30

## 2025-07-02 ENCOUNTER — PROCEDURE VISIT (OUTPATIENT)
Dept: UROGYNECOLOGY | Facility: CLINIC | Age: 60
End: 2025-07-02
Payer: COMMERCIAL

## 2025-07-02 VITALS
SYSTOLIC BLOOD PRESSURE: 107 MMHG | HEIGHT: 66 IN | DIASTOLIC BLOOD PRESSURE: 68 MMHG | BODY MASS INDEX: 17.19 KG/M2 | WEIGHT: 106.94 LBS | HEART RATE: 72 BPM

## 2025-07-02 DIAGNOSIS — N30.10 IC (INTERSTITIAL CYSTITIS): Primary | ICD-10-CM

## 2025-07-02 PROCEDURE — 87086 URINE CULTURE/COLONY COUNT: CPT

## 2025-07-02 RX ORDER — TRIAMCINOLONE ACETONIDE 40 MG/ML
40 INJECTION, SUSPENSION INTRA-ARTICULAR; INTRAMUSCULAR
Status: COMPLETED | OUTPATIENT
Start: 2025-07-02 | End: 2025-07-02

## 2025-07-02 RX ORDER — SODIUM BICARBONATE 42 MG/ML
2.5 INJECTION, SOLUTION INTRAVENOUS ONCE
Status: SHIPPED | OUTPATIENT
Start: 2025-07-02

## 2025-07-02 RX ORDER — SODIUM BICARBONATE 42 MG/ML
2.5 INJECTION, SOLUTION INTRAVENOUS ONCE
Status: DISCONTINUED | OUTPATIENT
Start: 2025-07-02 | End: 2025-07-02

## 2025-07-02 RX ORDER — TRIAMCINOLONE ACETONIDE 40 MG/ML
40 INJECTION, SUSPENSION INTRA-ARTICULAR; INTRAMUSCULAR
Status: DISCONTINUED | OUTPATIENT
Start: 2025-07-02 | End: 2025-07-02

## 2025-07-02 RX ORDER — HEPARIN SODIUM 5000 [USP'U]/ML
10000 INJECTION, SOLUTION INTRAVENOUS; SUBCUTANEOUS
Status: SHIPPED | OUTPATIENT
Start: 2025-07-02

## 2025-07-02 RX ORDER — BUPIVACAINE HYDROCHLORIDE 5 MG/ML
20 INJECTION, SOLUTION EPIDURAL; INTRACAUDAL; PERINEURAL
Status: COMPLETED | OUTPATIENT
Start: 2025-07-02 | End: 2025-07-02

## 2025-07-02 RX ORDER — HEPARIN SODIUM 5000 [USP'U]/ML
10000 INJECTION, SOLUTION INTRAVENOUS; SUBCUTANEOUS
Status: DISCONTINUED | OUTPATIENT
Start: 2025-07-02 | End: 2025-07-02

## 2025-07-02 RX ADMIN — HEPARIN SODIUM 10000 UNITS: 5000 INJECTION, SOLUTION INTRAVENOUS; SUBCUTANEOUS at 02:07

## 2025-07-02 RX ADMIN — BUPIVACAINE HYDROCHLORIDE 100 MG: 5 INJECTION, SOLUTION EPIDURAL; INTRACAUDAL; PERINEURAL at 02:07

## 2025-07-02 RX ADMIN — TRIAMCINOLONE ACETONIDE 40 MG: 40 INJECTION, SUSPENSION INTRA-ARTICULAR; INTRAMUSCULAR at 02:07

## 2025-07-02 RX ADMIN — SODIUM BICARBONATE 2.5 MEQ: 42 INJECTION, SOLUTION INTRAVENOUS at 02:07

## 2025-07-02 NOTE — PROGRESS NOTES
Procedure explained to pt. A #12 red rubber cath inserted into bladder using sterile technique. The urethral meatus was prepped with betadine prior to cath insertion.  20 cc clear light green urine drained from bladder.  A solution of  5% marcaine 20 mL, heparin 10,000 IU/ml 10 mL, kenalog 40 mg, and sodium bicarb 4.2% (2.5 mEq) was instilled into bladder without difficulty. Procedure was tolerated very well. She was instructed to hold urine for one hour before voiding.

## 2025-07-04 ENCOUNTER — RESULTS FOLLOW-UP (OUTPATIENT)
Dept: EMERGENCY MEDICINE | Facility: HOSPITAL | Age: 60
End: 2025-07-04

## 2025-07-04 LAB — BACTERIA UR CULT: NO GROWTH

## 2025-07-11 ENCOUNTER — PATIENT MESSAGE (OUTPATIENT)
Dept: UROGYNECOLOGY | Facility: CLINIC | Age: 60
End: 2025-07-11
Payer: COMMERCIAL

## 2025-07-17 ENCOUNTER — PATIENT MESSAGE (OUTPATIENT)
Dept: UROGYNECOLOGY | Facility: CLINIC | Age: 60
End: 2025-07-17
Payer: COMMERCIAL

## 2025-07-18 ENCOUNTER — CLINICAL SUPPORT (OUTPATIENT)
Dept: UROGYNECOLOGY | Facility: CLINIC | Age: 60
End: 2025-07-18
Payer: COMMERCIAL

## 2025-07-18 VITALS — WEIGHT: 114.88 LBS | BODY MASS INDEX: 18.46 KG/M2 | HEIGHT: 66 IN

## 2025-07-18 DIAGNOSIS — N30.10 IC (INTERSTITIAL CYSTITIS): Primary | ICD-10-CM

## 2025-07-18 DIAGNOSIS — R39.89 BLADDER PAIN: ICD-10-CM

## 2025-07-18 PROCEDURE — 81002 URINALYSIS NONAUTO W/O SCOPE: CPT | Mod: S$GLB,,, | Performed by: NURSE PRACTITIONER

## 2025-07-18 PROCEDURE — 99999 PR PBB SHADOW E&M-EST. PATIENT-LVL III: CPT | Mod: PBBFAC,,,

## 2025-07-18 PROCEDURE — 51700 IRRIGATION OF BLADDER: CPT | Mod: S$GLB,,, | Performed by: NURSE PRACTITIONER

## 2025-07-18 RX ORDER — TRIAMCINOLONE ACETONIDE 40 MG/ML
40 INJECTION, SUSPENSION INTRA-ARTICULAR; INTRAMUSCULAR
Status: COMPLETED | OUTPATIENT
Start: 2025-07-18 | End: 2025-07-18

## 2025-07-18 RX ORDER — HEPARIN SODIUM 5000 [USP'U]/ML
10000 INJECTION, SOLUTION INTRAVENOUS; SUBCUTANEOUS
Status: COMPLETED | OUTPATIENT
Start: 2025-07-18 | End: 2025-07-18

## 2025-07-18 RX ORDER — SODIUM BICARBONATE 42 MG/ML
2.5 INJECTION, SOLUTION INTRAVENOUS ONCE
Status: COMPLETED | OUTPATIENT
Start: 2025-07-18 | End: 2025-07-18

## 2025-07-18 RX ORDER — BUPIVACAINE HYDROCHLORIDE 5 MG/ML
20 INJECTION, SOLUTION EPIDURAL; INTRACAUDAL; PERINEURAL
Status: COMPLETED | OUTPATIENT
Start: 2025-07-18 | End: 2025-07-18

## 2025-07-18 RX ADMIN — TRIAMCINOLONE ACETONIDE 40 MG: 40 INJECTION, SUSPENSION INTRA-ARTICULAR; INTRAMUSCULAR at 10:07

## 2025-07-18 RX ADMIN — HEPARIN SODIUM 10000 UNITS: 5000 INJECTION, SOLUTION INTRAVENOUS; SUBCUTANEOUS at 10:07

## 2025-07-18 RX ADMIN — SODIUM BICARBONATE 2.5 MEQ: 42 INJECTION, SOLUTION INTRAVENOUS at 10:07

## 2025-07-18 RX ADMIN — BUPIVACAINE HYDROCHLORIDE 100 MG: 5 INJECTION, SOLUTION EPIDURAL; INTRACAUDAL; PERINEURAL at 10:07

## 2025-07-18 NOTE — PROGRESS NOTES
Procedure reviewed with pt. Urine clean cath specimen obtained and POCT UA performed to assess for nitrates. No nitrates noted.  A #12 red rubber cath inserted into bladder using sterile technique. The urethral meatus was prepped with betadine prior to cath insertion. Drops of  clear blue-green urine drained from bladder.  A solution of  5% marcaine 20 mL, heparin 10,000 IU/ml 10 mL, kenalog 40 mg, and sodium bicarb 4.2% (2.5 mEq) was instilled into bladder without difficulty. Procedure was tolerated very well. She was instructed to hold urine for one hour before voiding.

## 2025-07-23 LAB
BILIRUB SERPL-MCNC: NORMAL MG/DL
BLOOD URINE, POC: NORMAL
CLARITY, POC UA: NORMAL
COLOR, POC UA: NORMAL
GLUCOSE UR QL STRIP: NORMAL
KETONES UR QL STRIP: NORMAL
LEUKOCYTE ESTERASE URINE, POC: NORMAL
NITRITE, POC UA: NORMAL
PH, POC UA: NORMAL
PROTEIN, POC: NORMAL
SPECIFIC GRAVITY, POC UA: NORMAL
UROBILINOGEN, POC UA: NORMAL

## 2025-07-25 DIAGNOSIS — M62.89 PELVIC FLOOR TENSION: ICD-10-CM

## 2025-07-25 RX ORDER — GABAPENTIN 100 MG/1
300 CAPSULE ORAL NIGHTLY
Qty: 90 CAPSULE | Refills: 11 | Status: SHIPPED | OUTPATIENT
Start: 2025-07-25 | End: 2026-07-25

## 2025-07-31 ENCOUNTER — PATIENT MESSAGE (OUTPATIENT)
Dept: UROGYNECOLOGY | Facility: CLINIC | Age: 60
End: 2025-07-31
Payer: COMMERCIAL

## 2025-08-01 ENCOUNTER — ANESTHESIA EVENT (OUTPATIENT)
Dept: SURGERY | Facility: OTHER | Age: 60
End: 2025-08-01
Payer: COMMERCIAL

## 2025-08-01 ENCOUNTER — CLINICAL SUPPORT (OUTPATIENT)
Dept: UROGYNECOLOGY | Facility: CLINIC | Age: 60
End: 2025-08-01
Payer: COMMERCIAL

## 2025-08-01 VITALS — HEIGHT: 66 IN | WEIGHT: 114.88 LBS | BODY MASS INDEX: 18.46 KG/M2

## 2025-08-01 DIAGNOSIS — N30.10 IC (INTERSTITIAL CYSTITIS): Primary | ICD-10-CM

## 2025-08-01 PROCEDURE — 99999 PR PBB SHADOW E&M-EST. PATIENT-LVL III: CPT | Mod: PBBFAC,,,

## 2025-08-01 RX ORDER — BUPIVACAINE HYDROCHLORIDE 5 MG/ML
20 INJECTION, SOLUTION EPIDURAL; INTRACAUDAL; PERINEURAL
Status: COMPLETED | OUTPATIENT
Start: 2025-08-01 | End: 2025-08-01

## 2025-08-01 RX ORDER — HEPARIN SODIUM 5000 [USP'U]/ML
10000 INJECTION, SOLUTION INTRAVENOUS; SUBCUTANEOUS
Status: COMPLETED | OUTPATIENT
Start: 2025-08-01 | End: 2025-08-01

## 2025-08-01 RX ORDER — SODIUM BICARBONATE 42 MG/ML
2.5 INJECTION, SOLUTION INTRAVENOUS ONCE
Status: COMPLETED | OUTPATIENT
Start: 2025-08-01 | End: 2025-08-01

## 2025-08-01 RX ORDER — TRIAMCINOLONE ACETONIDE 40 MG/ML
40 INJECTION, SUSPENSION INTRA-ARTICULAR; INTRAMUSCULAR
Status: COMPLETED | OUTPATIENT
Start: 2025-08-01 | End: 2025-08-01

## 2025-08-01 RX ADMIN — TRIAMCINOLONE ACETONIDE 40 MG: 40 INJECTION, SUSPENSION INTRA-ARTICULAR; INTRAMUSCULAR at 09:08

## 2025-08-01 RX ADMIN — SODIUM BICARBONATE 2.5 MEQ: 42 INJECTION, SOLUTION INTRAVENOUS at 09:08

## 2025-08-01 RX ADMIN — BUPIVACAINE HYDROCHLORIDE 100 MG: 5 INJECTION, SOLUTION EPIDURAL; INTRACAUDAL; PERINEURAL at 09:08

## 2025-08-01 RX ADMIN — HEPARIN SODIUM 10000 UNITS: 5000 INJECTION, SOLUTION INTRAVENOUS; SUBCUTANEOUS at 09:08

## 2025-08-01 NOTE — PROGRESS NOTES
Pt voids before procedure. POCT test for nitrates negative. A #12 red rubber cath inserted into bladder using sterile technique. The urethral meatus was prepped with betadine prior to cath insertion.  20 cc clear blue/green urine drained from bladder.  A solution of  5% marcaine 20 mL, heparin 10,000 IU/ml 10 mL, kenalog 40 mg, and sodium bicarb 4.2% (2.5 mEq) was instilled into bladder without difficulty. Procedure was tolerated very well. She was instructed to hold urine for one hour before voiding.

## 2025-08-04 ENCOUNTER — HOSPITAL ENCOUNTER (OUTPATIENT)
Dept: PREADMISSION TESTING | Facility: OTHER | Age: 60
Discharge: HOME | End: 2025-08-04
Attending: OBSTETRICS & GYNECOLOGY
Payer: COMMERCIAL

## 2025-08-04 VITALS
SYSTOLIC BLOOD PRESSURE: 98 MMHG | WEIGHT: 112 LBS | OXYGEN SATURATION: 97 % | DIASTOLIC BLOOD PRESSURE: 55 MMHG | RESPIRATION RATE: 16 BRPM | HEIGHT: 66 IN | BODY MASS INDEX: 18 KG/M2 | HEART RATE: 72 BPM | TEMPERATURE: 98 F

## 2025-08-04 DIAGNOSIS — Z01.818 PREOP TESTING: Primary | ICD-10-CM

## 2025-08-04 LAB
ABSOLUTE EOSINOPHIL (OHS): 0.5 K/UL
ABSOLUTE MONOCYTE (OHS): 0.57 K/UL (ref 0.3–1)
ABSOLUTE NEUTROPHIL COUNT (OHS): 3.4 K/UL (ref 1.8–7.7)
ANION GAP (OHS): 5 MMOL/L (ref 8–16)
BASOPHILS # BLD AUTO: 0.07 K/UL
BASOPHILS NFR BLD AUTO: 1.1 %
BUN SERPL-MCNC: 27 MG/DL (ref 8–23)
CALCIUM SERPL-MCNC: 9.5 MG/DL (ref 8.7–10.5)
CHLORIDE SERPL-SCNC: 107 MMOL/L (ref 95–110)
CO2 SERPL-SCNC: 27 MMOL/L (ref 23–29)
CREAT SERPL-MCNC: 0.7 MG/DL (ref 0.5–1.4)
ERYTHROCYTE [DISTWIDTH] IN BLOOD BY AUTOMATED COUNT: 11.9 % (ref 11.5–14.5)
GFR SERPLBLD CREATININE-BSD FMLA CKD-EPI: >60 ML/MIN/1.73/M2
GLUCOSE SERPL-MCNC: 89 MG/DL (ref 70–110)
HCT VFR BLD AUTO: 39.2 % (ref 37–48.5)
HGB BLD-MCNC: 13.6 GM/DL (ref 12–16)
IMM GRANULOCYTES # BLD AUTO: 0.01 K/UL (ref 0–0.04)
IMM GRANULOCYTES NFR BLD AUTO: 0.2 % (ref 0–0.5)
LYMPHOCYTES # BLD AUTO: 2.09 K/UL (ref 1–4.8)
MCH RBC QN AUTO: 31.3 PG (ref 27–31)
MCHC RBC AUTO-ENTMCNC: 34.7 G/DL (ref 32–36)
MCV RBC AUTO: 90 FL (ref 82–98)
NUCLEATED RBC (/100WBC) (OHS): 0 /100 WBC
PLATELET # BLD AUTO: 268 K/UL (ref 150–450)
PMV BLD AUTO: 9.9 FL (ref 9.2–12.9)
POTASSIUM SERPL-SCNC: 4.6 MMOL/L (ref 3.5–5.1)
RBC # BLD AUTO: 4.34 M/UL (ref 4–5.4)
RELATIVE EOSINOPHIL (OHS): 7.5 %
RELATIVE LYMPHOCYTE (OHS): 31.5 % (ref 18–48)
RELATIVE MONOCYTE (OHS): 8.6 % (ref 4–15)
RELATIVE NEUTROPHIL (OHS): 51.1 % (ref 38–73)
SODIUM SERPL-SCNC: 139 MMOL/L (ref 136–145)
WBC # BLD AUTO: 6.64 K/UL (ref 3.9–12.7)

## 2025-08-04 PROCEDURE — 82310 ASSAY OF CALCIUM: CPT

## 2025-08-04 PROCEDURE — 85025 COMPLETE CBC W/AUTO DIFF WBC: CPT

## 2025-08-04 RX ORDER — LORATADINE 10 MG/1
TABLET ORAL NIGHTLY
COMMUNITY
Start: 2024-10-01

## 2025-08-04 RX ORDER — SODIUM CHLORIDE, SODIUM LACTATE, POTASSIUM CHLORIDE, CALCIUM CHLORIDE 600; 310; 30; 20 MG/100ML; MG/100ML; MG/100ML; MG/100ML
INJECTION, SOLUTION INTRAVENOUS CONTINUOUS
OUTPATIENT
Start: 2025-08-04

## 2025-08-04 RX ORDER — METHENAMINE, SODIUM PHOSPHATE, MONOBASIC, ANHYDROUS, PHENYL SALICYLATE, METHYLENE BLUE AND HYOSCYAMINE SULFATE 118; 40.8; 36; 10; .12 MG/1; MG/1; MG/1; MG/1; MG/1
1 CAPSULE ORAL 4 TIMES DAILY PRN
COMMUNITY
Start: 2025-06-16

## 2025-08-04 RX ORDER — LIDOCAINE HYDROCHLORIDE 10 MG/ML
0.5 INJECTION, SOLUTION EPIDURAL; INFILTRATION; INTRACAUDAL; PERINEURAL ONCE
OUTPATIENT
Start: 2025-08-04 | End: 2025-08-04

## 2025-08-04 RX ORDER — ECHINACEA 380 MG
CAPSULE ORAL DAILY
COMMUNITY
Start: 2025-06-13

## 2025-08-04 NOTE — DISCHARGE INSTRUCTIONS
Information to Prepare you for your Surgery    PRE-ADMIT TESTING -  [phone]   -Sera  [address] [place] [address] [facility] [address]  [facility]      Your surgery has been scheduled at [facility]. We are pleased to have the opportunity to serve you. For Further Information please call [phone].    On the day of surgery please report to the Information Desk on the 1st floor.    CONTACT YOUR PHYSICIAN'S OFFICE THE DAY PRIOR TO YOUR SURGERY TO OBTAIN YOUR ARRIVAL TIME.     The evening before surgery do not eat anything after 9 p.m. ( this includes hard candy, chewing gum and mints).  You may only have GATORADE, POWERADE AND WATER  from 9 p.m. until you leave your home.    DO NOT DRINK ANY LIQUIDS ON THE WAY TO THE HOSPITAL.      Why does your anesthesiologist allow you to drink Gatorade/Powerade before surgery?  Gatorade/Powerade helps to increase your comfort before surgery and to decrease your nausea after surgery. The carbohydrates in Gatorade/Powerade help reduce your body's stress response to surgery.      Outpatient Surgery- May allow 2 adult (18 and older) Support Persons (1 being the designated ) for all surgical/procedural patients. A breastfeeding mother will be allowed her infant and 2 adult Support Persons. No one under the age of 18 will be allowed in the building. No swapping out of visitors in the [facility].      SPECIAL MEDICATION INSTRUCTIONS: TAKE medications checked off on your Medication List.        Surgery Patients:    If you take ASPIRIN - Your PHYSICIAN/SURGEON will need to inform you IF/OR when you need to stop taking aspirin prior to your surgery.     The week prior to surgery do not ot take any medications containing IBUPROFEN or NSAIDS ( Advil, Motrin, Goodys, BC, Aleve, Naproxen,  Ketorolac, Meloxicam, Mobic, Toradol,etc) If you are not sure if you should take a medicine please call your  surgeon's office.  Ok to take Tylenol    Do Not Wear any make-up (especially eye make-up) to surgery. Please remove any false eyelashes or eyelash extensions. If you arrive the day of surgery with makeup/eyelashes on you will be required to remove prior to surgery. (There is a risk of corneal abrasions if eye makeup/eyelash extensions are not removed)      Leave all valuables at home.   Do Not wear any jewelry or watches, including any metal in body piercings. Jewelry must be removed prior to coming to the hospital.  There is a possibility that rings that are unable to be removed may be cut off if they are on the surgical extremity.    Please remove all hair extensions, wigs, clips and any other metal accessories/ ornaments from your hair.  These items may pose a flammable/fire risk in Surgery and must be removed.    Do not shave your surgical area at least 5 days prior to your surgery. The surgical prep will be performed at the hospital according to Infection Control regulations.    Contact Lens must be removed before surgery. Either do not wear the contact lens or bring a case and solution for storage.  Please bring a container for eyeglasses or dentures as required.  Bring any paperwork your physician has provided, such as consent forms,  history and physicals, doctor's orders, etc.   Bring comfortable clothes that are loose fitting to wear upon discharge. Take into consideration the type of surgery being performed.  Maintain your diet as advised per your physician the day prior to surgery.      Adequate rest the night before surgery is advised.   Park in the Parking lot behind the hospital or in the [facility] across the street from the parking lot. Parking is complimentary.  If you will be discharged the same day as your procedure, please arrange for a responsible adult to drive you home or to accompany you if traveling by taxi.   YOU WILL NOT BE PERMITTED TO DRIVE OR TO LEAVE THE HOSPITAL ALONE  AFTER SURGERY.   If you are being discharged the same day, it is strongly recommended that you arrange for someone to remain with you for the first 24 hrs following your surgery.    The Surgeon will speak to your family/visitor after your surgery regarding the outcome of your surgery and post op care.  The Surgeon may speak to you after your surgery, but there is a possibility you may not remember the details.  Please check with your family members regarding the conversation with the Surgeon.    We strongly recommend whoever is bringing you home be present for discharge instructions.  This will ensure a thorough understanding for your post op home care.    If the patient has fever, cough, or signs/symptoms of Flu or Covid please do not come in for your surgery. Contact your surgeon and your primary care physician for further instructions.           Thank you for your cooperation.  The Staff of [facility].            Bathing Instructions with Hibiclens or Dial Soap    Shower the evening before and morning of your procedure with Chlorhexidine (Hibiclens)  do not use Chlorhexidine on your face or genitals. Do not get in your eyes.  Wash your face with water and your regular face wash/soap  Use your regular shampoo  Apply Chlorhexidine (Hibiclens) directly on your skin or on a wet washcloth and wash gently. When showering: Move away from the shower stream when applying Chlorhexidine (Hibiclens) to avoid rinsing off too soon.  Rinse thoroughly with warm water  Do not dilute Chlorhexidine (Hibiclens)   Dry off as usual, do not use any deodorant, powder, body lotions, perfume, after shave or cologne.   Place clean sheets on bed day before surgery.    Thanks Sera

## 2025-08-08 ENCOUNTER — PATIENT MESSAGE (OUTPATIENT)
Dept: UROGYNECOLOGY | Facility: CLINIC | Age: 60
End: 2025-08-08
Payer: COMMERCIAL

## 2025-08-12 ENCOUNTER — OFFICE VISIT (OUTPATIENT)
Dept: UROGYNECOLOGY | Facility: CLINIC | Age: 60
End: 2025-08-12
Payer: COMMERCIAL

## 2025-08-12 DIAGNOSIS — R39.89 URETHRAL PAIN: ICD-10-CM

## 2025-08-12 DIAGNOSIS — N30.10 IC (INTERSTITIAL CYSTITIS): Primary | ICD-10-CM

## 2025-08-12 DIAGNOSIS — R39.89 BLADDER PAIN: ICD-10-CM

## 2025-08-12 PROCEDURE — 99499 UNLISTED E&M SERVICE: CPT | Mod: 95,,, | Performed by: NURSE PRACTITIONER

## 2025-08-14 ENCOUNTER — HOSPITAL ENCOUNTER (OUTPATIENT)
Facility: OTHER | Age: 60
Discharge: HOME | End: 2025-08-14
Attending: OBSTETRICS & GYNECOLOGY | Admitting: OBSTETRICS & GYNECOLOGY
Payer: COMMERCIAL

## 2025-08-14 ENCOUNTER — ANESTHESIA (OUTPATIENT)
Dept: SURGERY | Facility: OTHER | Age: 60
End: 2025-08-14
Payer: COMMERCIAL

## 2025-08-14 DIAGNOSIS — Z98.890 STATUS POST CYSTOSCOPY: ICD-10-CM

## 2025-08-14 DIAGNOSIS — M62.89 PELVIC FLOOR DYSFUNCTION: ICD-10-CM

## 2025-08-14 DIAGNOSIS — Z98.890 STATUS POST CYSTOSCOPY: Primary | ICD-10-CM

## 2025-08-14 DIAGNOSIS — R39.89 BLADDER PAIN: ICD-10-CM

## 2025-08-14 PROCEDURE — 94640 AIRWAY INHALATION TREATMENT: CPT

## 2025-08-14 PROCEDURE — 25000003 PHARM REV CODE 250: Performed by: OBSTETRICS & GYNECOLOGY

## 2025-08-14 PROCEDURE — 37000009 HC ANESTHESIA EA ADD 15 MINS: Performed by: OBSTETRICS & GYNECOLOGY

## 2025-08-14 PROCEDURE — 25000003 PHARM REV CODE 250

## 2025-08-14 PROCEDURE — 71000016 HC POSTOP RECOV ADDL HR: Performed by: OBSTETRICS & GYNECOLOGY

## 2025-08-14 PROCEDURE — 94760 N-INVAS EAR/PLS OXIMETRY 1: CPT

## 2025-08-14 PROCEDURE — 71000015 HC POSTOP RECOV 1ST HR: Performed by: OBSTETRICS & GYNECOLOGY

## 2025-08-14 PROCEDURE — 63600175 PHARM REV CODE 636 W HCPCS: Performed by: NURSE ANESTHETIST, CERTIFIED REGISTERED

## 2025-08-14 PROCEDURE — 63600175 PHARM REV CODE 636 W HCPCS: Performed by: OBSTETRICS & GYNECOLOGY

## 2025-08-14 PROCEDURE — 36000707: Performed by: OBSTETRICS & GYNECOLOGY

## 2025-08-14 PROCEDURE — 36000706: Performed by: OBSTETRICS & GYNECOLOGY

## 2025-08-14 PROCEDURE — 52260 CYSTOSCOPY AND TREATMENT: CPT | Mod: ,,, | Performed by: OBSTETRICS & GYNECOLOGY

## 2025-08-14 PROCEDURE — 52283 CYSTOSCOPY AND TREATMENT: CPT | Mod: 51,,, | Performed by: OBSTETRICS & GYNECOLOGY

## 2025-08-14 PROCEDURE — 25000242 PHARM REV CODE 250 ALT 637 W/ HCPCS: Performed by: OBSTETRICS & GYNECOLOGY

## 2025-08-14 PROCEDURE — 63600175 PHARM REV CODE 636 W HCPCS

## 2025-08-14 PROCEDURE — 37000008 HC ANESTHESIA 1ST 15 MINUTES: Performed by: OBSTETRICS & GYNECOLOGY

## 2025-08-14 RX ORDER — PROCHLORPERAZINE EDISYLATE 5 MG/ML
5 INJECTION INTRAMUSCULAR; INTRAVENOUS EVERY 6 HOURS PRN
Status: DISCONTINUED | OUTPATIENT
Start: 2025-08-14 | End: 2025-08-14 | Stop reason: HOSPADM

## 2025-08-14 RX ORDER — IBUPROFEN 600 MG/1
600 TABLET, FILM COATED ORAL
Status: DISCONTINUED | OUTPATIENT
Start: 2025-08-14 | End: 2025-08-14 | Stop reason: HOSPADM

## 2025-08-14 RX ORDER — OXYBUTYNIN CHLORIDE 5 MG/1
5 TABLET ORAL ONCE
Status: COMPLETED | OUTPATIENT
Start: 2025-08-14 | End: 2025-08-14

## 2025-08-14 RX ORDER — LIDOCAINE HYDROCHLORIDE 10 MG/ML
0.5 INJECTION, SOLUTION EPIDURAL; INFILTRATION; INTRACAUDAL; PERINEURAL ONCE
Status: DISCONTINUED | OUTPATIENT
Start: 2025-08-14 | End: 2025-08-14 | Stop reason: HOSPADM

## 2025-08-14 RX ORDER — LIDOCAINE HYDROCHLORIDE 20 MG/ML
INJECTION INTRAVENOUS
Status: DISCONTINUED | OUTPATIENT
Start: 2025-08-14 | End: 2025-08-14

## 2025-08-14 RX ORDER — LEVALBUTEROL INHALATION SOLUTION 0.63 MG/3ML
0.63 SOLUTION RESPIRATORY (INHALATION)
Status: DISCONTINUED | OUTPATIENT
Start: 2025-08-14 | End: 2025-08-14 | Stop reason: HOSPADM

## 2025-08-14 RX ORDER — ONDANSETRON HYDROCHLORIDE 2 MG/ML
4 INJECTION, SOLUTION INTRAVENOUS EVERY 12 HOURS PRN
Status: DISCONTINUED | OUTPATIENT
Start: 2025-08-14 | End: 2025-08-14 | Stop reason: HOSPADM

## 2025-08-14 RX ORDER — ESTRADIOL 0.1 MG/G
0.5 CREAM VAGINAL NIGHTLY
Qty: 42.5 G | Refills: 11 | Status: SHIPPED | OUTPATIENT
Start: 2025-08-14

## 2025-08-14 RX ORDER — ONDANSETRON HYDROCHLORIDE 2 MG/ML
INJECTION, SOLUTION INTRAVENOUS
Status: DISCONTINUED | OUTPATIENT
Start: 2025-08-14 | End: 2025-08-14

## 2025-08-14 RX ORDER — FENTANYL CITRATE 50 UG/ML
INJECTION, SOLUTION INTRAMUSCULAR; INTRAVENOUS
Status: DISCONTINUED | OUTPATIENT
Start: 2025-08-14 | End: 2025-08-14

## 2025-08-14 RX ORDER — PROPOFOL 10 MG/ML
VIAL (ML) INTRAVENOUS
Status: DISCONTINUED | OUTPATIENT
Start: 2025-08-14 | End: 2025-08-14

## 2025-08-14 RX ORDER — GLUCAGON 1 MG
1 KIT INJECTION
Status: CANCELLED | OUTPATIENT
Start: 2025-08-14

## 2025-08-14 RX ORDER — PHENYLEPHRINE HYDROCHLORIDE 10 MG/ML
INJECTION INTRAVENOUS
Status: DISCONTINUED | OUTPATIENT
Start: 2025-08-14 | End: 2025-08-14

## 2025-08-14 RX ORDER — OXYCODONE HYDROCHLORIDE 5 MG/1
5 TABLET ORAL EVERY 4 HOURS PRN
Status: DISCONTINUED | OUTPATIENT
Start: 2025-08-14 | End: 2025-08-14 | Stop reason: HOSPADM

## 2025-08-14 RX ORDER — PROCHLORPERAZINE EDISYLATE 5 MG/ML
5 INJECTION INTRAMUSCULAR; INTRAVENOUS EVERY 30 MIN PRN
Status: CANCELLED | OUTPATIENT
Start: 2025-08-14

## 2025-08-14 RX ORDER — HYDROMORPHONE HYDROCHLORIDE 2 MG/ML
0.4 INJECTION, SOLUTION INTRAMUSCULAR; INTRAVENOUS; SUBCUTANEOUS EVERY 5 MIN PRN
Refills: 0 | Status: CANCELLED | OUTPATIENT
Start: 2025-08-14

## 2025-08-14 RX ORDER — SODIUM CHLORIDE 0.9 % (FLUSH) 0.9 %
3 SYRINGE (ML) INJECTION
Status: CANCELLED | OUTPATIENT
Start: 2025-08-14

## 2025-08-14 RX ORDER — OXYCODONE HYDROCHLORIDE 5 MG/1
5 TABLET ORAL
Refills: 0 | Status: CANCELLED | OUTPATIENT
Start: 2025-08-14

## 2025-08-14 RX ORDER — FAMOTIDINE 20 MG/1
20 TABLET, FILM COATED ORAL
Status: COMPLETED | OUTPATIENT
Start: 2025-08-14 | End: 2025-08-14

## 2025-08-14 RX ORDER — PROPOFOL 10 MG/ML
VIAL (ML) INTRAVENOUS CONTINUOUS PRN
Status: DISCONTINUED | OUTPATIENT
Start: 2025-08-14 | End: 2025-08-14

## 2025-08-14 RX ORDER — MUPIROCIN 20 MG/G
OINTMENT TOPICAL
Status: DISCONTINUED | OUTPATIENT
Start: 2025-08-14 | End: 2025-08-14 | Stop reason: HOSPADM

## 2025-08-14 RX ORDER — LIDOCAINE HYDROCHLORIDE 20 MG/ML
JELLY TOPICAL
Status: DISCONTINUED | OUTPATIENT
Start: 2025-08-14 | End: 2025-08-14 | Stop reason: HOSPADM

## 2025-08-14 RX ORDER — OXYCODONE HYDROCHLORIDE 5 MG/1
5 TABLET ORAL EVERY 4 HOURS PRN
Qty: 5 TABLET | Refills: 0 | Status: SHIPPED | OUTPATIENT
Start: 2025-08-14

## 2025-08-14 RX ORDER — SODIUM CHLORIDE, SODIUM LACTATE, POTASSIUM CHLORIDE, CALCIUM CHLORIDE 600; 310; 30; 20 MG/100ML; MG/100ML; MG/100ML; MG/100ML
INJECTION, SOLUTION INTRAVENOUS CONTINUOUS
Status: DISCONTINUED | OUTPATIENT
Start: 2025-08-14 | End: 2025-08-14 | Stop reason: HOSPADM

## 2025-08-14 RX ORDER — CEFAZOLIN 2 G/1
2 INJECTION, POWDER, FOR SOLUTION INTRAMUSCULAR; INTRAVENOUS
Status: DISCONTINUED | OUTPATIENT
Start: 2025-08-14 | End: 2025-08-14 | Stop reason: HOSPADM

## 2025-08-14 RX ORDER — TRIAMCINOLONE ACETONIDE 40 MG/ML
INJECTION, SUSPENSION INTRA-ARTICULAR; INTRAMUSCULAR
Status: DISCONTINUED | OUTPATIENT
Start: 2025-08-14 | End: 2025-08-14 | Stop reason: HOSPADM

## 2025-08-14 RX ORDER — DIPHENHYDRAMINE HYDROCHLORIDE 50 MG/ML
INJECTION, SOLUTION INTRAMUSCULAR; INTRAVENOUS
Status: DISCONTINUED | OUTPATIENT
Start: 2025-08-14 | End: 2025-08-14

## 2025-08-14 RX ORDER — PHENAZOPYRIDINE HYDROCHLORIDE 100 MG/1
200 TABLET, FILM COATED ORAL 3 TIMES DAILY
Status: DISCONTINUED | OUTPATIENT
Start: 2025-08-14 | End: 2025-08-14 | Stop reason: HOSPADM

## 2025-08-14 RX ORDER — DEXAMETHASONE SODIUM PHOSPHATE 4 MG/ML
INJECTION, SOLUTION INTRA-ARTICULAR; INTRALESIONAL; INTRAMUSCULAR; INTRAVENOUS; SOFT TISSUE
Status: DISCONTINUED | OUTPATIENT
Start: 2025-08-14 | End: 2025-08-14

## 2025-08-14 RX ADMIN — PROPOFOL 20 MG: 10 INJECTION, EMULSION INTRAVENOUS at 11:08

## 2025-08-14 RX ADMIN — PROPOFOL 30 MG: 10 INJECTION, EMULSION INTRAVENOUS at 11:08

## 2025-08-14 RX ADMIN — FENTANYL CITRATE 50 MCG: 50 INJECTION, SOLUTION INTRAMUSCULAR; INTRAVENOUS at 11:08

## 2025-08-14 RX ADMIN — FAMOTIDINE 20 MG: 20 TABLET, FILM COATED ORAL at 08:08

## 2025-08-14 RX ADMIN — DEXAMETHASONE SODIUM PHOSPHATE 4 MG: 4 INJECTION, SOLUTION INTRAMUSCULAR; INTRAVENOUS at 11:08

## 2025-08-14 RX ADMIN — PROPOFOL 100 MG: 10 INJECTION, EMULSION INTRAVENOUS at 11:08

## 2025-08-14 RX ADMIN — SODIUM CHLORIDE, POTASSIUM CHLORIDE, SODIUM LACTATE AND CALCIUM CHLORIDE: 600; 310; 30; 20 INJECTION, SOLUTION INTRAVENOUS at 10:08

## 2025-08-14 RX ADMIN — LEVALBUTEROL HYDROCHLORIDE 0.63 MG: 0.63 SOLUTION RESPIRATORY (INHALATION) at 09:08

## 2025-08-14 RX ADMIN — PHENAZOPYRIDINE 200 MG: 100 TABLET ORAL at 01:08

## 2025-08-14 RX ADMIN — DIPHENHYDRAMINE HYDROCHLORIDE 12.5 MG: 50 INJECTION, SOLUTION INTRAMUSCULAR; INTRAVENOUS at 11:08

## 2025-08-14 RX ADMIN — OXYBUTYNIN CHLORIDE 5 MG: 5 TABLET ORAL at 01:08

## 2025-08-14 RX ADMIN — ONDANSETRON HYDROCHLORIDE 4 MG: 2 INJECTION INTRAMUSCULAR; INTRAVENOUS at 11:08

## 2025-08-14 RX ADMIN — PHENYLEPHRINE HYDROCHLORIDE 50 MCG: 10 INJECTION INTRAVENOUS at 11:08

## 2025-08-14 RX ADMIN — LIDOCAINE HYDROCHLORIDE 50 MG: 20 INJECTION, SOLUTION INTRAVENOUS at 11:08

## 2025-08-14 RX ADMIN — IBUPROFEN 600 MG: 600 TABLET ORAL at 01:08

## 2025-08-14 RX ADMIN — CEFAZOLIN 2 G: 2 INJECTION, POWDER, FOR SOLUTION INTRAMUSCULAR; INTRAVENOUS at 11:08

## 2025-08-14 RX ADMIN — PROPOFOL 75 MCG/KG/MIN: 10 INJECTION, EMULSION INTRAVENOUS at 11:08

## 2025-08-14 RX ADMIN — FUROSEMIDE 5 MG: 10 INJECTION, SOLUTION INTRAMUSCULAR; INTRAVENOUS at 11:08

## 2025-08-14 RX ADMIN — MUPIROCIN: 20 OINTMENT TOPICAL at 08:08

## 2025-08-15 VITALS
BODY MASS INDEX: 18.66 KG/M2 | RESPIRATION RATE: 18 BRPM | OXYGEN SATURATION: 100 % | HEIGHT: 65 IN | SYSTOLIC BLOOD PRESSURE: 157 MMHG | DIASTOLIC BLOOD PRESSURE: 86 MMHG | WEIGHT: 112 LBS | TEMPERATURE: 98 F | HEART RATE: 68 BPM

## 2025-08-20 ENCOUNTER — PATIENT MESSAGE (OUTPATIENT)
Dept: UROGYNECOLOGY | Facility: CLINIC | Age: 60
End: 2025-08-20
Payer: COMMERCIAL

## 2025-08-20 DIAGNOSIS — N30.10 IC (INTERSTITIAL CYSTITIS): Primary | ICD-10-CM

## 2025-08-20 RX ORDER — METHENAMINE, SODIUM PHOSPHATE, MONOBASIC, ANHYDROUS, PHENYL SALICYLATE, METHYLENE BLUE AND HYOSCYAMINE SULFATE 118; 40.8; 36; 10; .12 MG/1; MG/1; MG/1; MG/1; MG/1
1 CAPSULE ORAL 4 TIMES DAILY PRN
Qty: 160 CAPSULE | Refills: 11 | Status: SHIPPED | OUTPATIENT
Start: 2025-08-20

## 2025-08-24 DIAGNOSIS — N95.1 MENOPAUSAL SYMPTOMS: ICD-10-CM

## 2025-08-26 RX ORDER — ESTRADIOL 0.25 MG/.25G
GEL TOPICAL
Qty: 30 PACKET | Refills: 4 | Status: SHIPPED | OUTPATIENT
Start: 2025-08-26

## 2025-09-04 ENCOUNTER — OFFICE VISIT (OUTPATIENT)
Dept: OBSTETRICS AND GYNECOLOGY | Facility: CLINIC | Age: 60
End: 2025-09-04
Payer: COMMERCIAL

## 2025-09-04 VITALS — SYSTOLIC BLOOD PRESSURE: 90 MMHG | WEIGHT: 112.44 LBS | DIASTOLIC BLOOD PRESSURE: 60 MMHG | BODY MASS INDEX: 18.71 KG/M2

## 2025-09-04 DIAGNOSIS — Z01.419 WOMEN'S ANNUAL ROUTINE GYNECOLOGICAL EXAMINATION: Primary | ICD-10-CM

## 2025-09-04 DIAGNOSIS — N95.1 MENOPAUSAL SYMPTOMS: ICD-10-CM

## 2025-09-04 DIAGNOSIS — Z12.39 ENCOUNTER FOR SCREENING FOR MALIGNANT NEOPLASM OF BREAST, UNSPECIFIED SCREENING MODALITY: ICD-10-CM

## 2025-09-04 PROCEDURE — 3074F SYST BP LT 130 MM HG: CPT | Mod: CPTII,S$GLB,, | Performed by: NURSE PRACTITIONER

## 2025-09-04 PROCEDURE — 3008F BODY MASS INDEX DOCD: CPT | Mod: CPTII,S$GLB,, | Performed by: NURSE PRACTITIONER

## 2025-09-04 PROCEDURE — 1159F MED LIST DOCD IN RCRD: CPT | Mod: CPTII,S$GLB,, | Performed by: NURSE PRACTITIONER

## 2025-09-04 PROCEDURE — 99999 PR PBB SHADOW E&M-EST. PATIENT-LVL III: CPT | Mod: PBBFAC,,, | Performed by: NURSE PRACTITIONER

## 2025-09-04 PROCEDURE — 1160F RVW MEDS BY RX/DR IN RCRD: CPT | Mod: CPTII,S$GLB,, | Performed by: NURSE PRACTITIONER

## 2025-09-04 PROCEDURE — 3078F DIAST BP <80 MM HG: CPT | Mod: CPTII,S$GLB,, | Performed by: NURSE PRACTITIONER

## 2025-09-04 PROCEDURE — 99396 PREV VISIT EST AGE 40-64: CPT | Mod: S$GLB,,, | Performed by: NURSE PRACTITIONER

## (undated) DEVICE — GLOVE SENSICARE PI SURG 7

## (undated) DEVICE — GLOVE SENSICARE PI GRN 7

## (undated) DEVICE — SOL IRRI STRL WATER 1000ML

## (undated) DEVICE — CATH URETH INTMIT FEM 14FR 6IN

## (undated) DEVICE — SET IRR CYSTO 4 LEAD 90IN

## (undated) DEVICE — Device

## (undated) DEVICE — DRAPE LEGGINGS CUFF 31X48IN

## (undated) DEVICE — SOL POVIDONE SCRUB IODINE 4 OZ

## (undated) DEVICE — NDL WILLIAMS CYSTOSCOPIC

## (undated) DEVICE — BAG LINGEMAN DRAIN UROLOGY